# Patient Record
Sex: MALE | Race: BLACK OR AFRICAN AMERICAN | Employment: UNEMPLOYED | URBAN - METROPOLITAN AREA
[De-identification: names, ages, dates, MRNs, and addresses within clinical notes are randomized per-mention and may not be internally consistent; named-entity substitution may affect disease eponyms.]

---

## 2021-01-01 ENCOUNTER — ANESTHESIA (OUTPATIENT)
Dept: MEDSURG UNIT | Age: 61
DRG: 208 | End: 2021-01-01
Payer: MEDICARE

## 2021-01-01 ENCOUNTER — APPOINTMENT (OUTPATIENT)
Dept: GENERAL RADIOLOGY | Age: 61
DRG: 208 | End: 2021-01-01
Attending: HOSPITALIST
Payer: MEDICARE

## 2021-01-01 ENCOUNTER — APPOINTMENT (OUTPATIENT)
Dept: ULTRASOUND IMAGING | Age: 61
DRG: 208 | End: 2021-01-01
Attending: HOSPITALIST
Payer: MEDICARE

## 2021-01-01 ENCOUNTER — APPOINTMENT (OUTPATIENT)
Dept: CT IMAGING | Age: 61
DRG: 208 | End: 2021-01-01
Attending: HOSPITALIST
Payer: MEDICARE

## 2021-01-01 ENCOUNTER — APPOINTMENT (OUTPATIENT)
Dept: NON INVASIVE DIAGNOSTICS | Age: 61
DRG: 208 | End: 2021-01-01
Attending: FAMILY MEDICINE
Payer: MEDICARE

## 2021-01-01 ENCOUNTER — APPOINTMENT (OUTPATIENT)
Dept: GENERAL RADIOLOGY | Age: 61
DRG: 208 | End: 2021-01-01
Attending: EMERGENCY MEDICINE
Payer: MEDICARE

## 2021-01-01 ENCOUNTER — APPOINTMENT (OUTPATIENT)
Dept: MRI IMAGING | Age: 61
DRG: 208 | End: 2021-01-01
Attending: HOSPITALIST
Payer: MEDICARE

## 2021-01-01 ENCOUNTER — ANESTHESIA EVENT (OUTPATIENT)
Dept: MEDSURG UNIT | Age: 61
DRG: 208 | End: 2021-01-01
Payer: MEDICARE

## 2021-01-01 ENCOUNTER — HOSPITAL ENCOUNTER (INPATIENT)
Age: 61
LOS: 8 days | DRG: 208 | End: 2021-03-19
Attending: EMERGENCY MEDICINE | Admitting: FAMILY MEDICINE
Payer: MEDICARE

## 2021-01-01 ENCOUNTER — APPOINTMENT (OUTPATIENT)
Dept: NON INVASIVE DIAGNOSTICS | Age: 61
DRG: 208 | End: 2021-01-01
Attending: INTERNAL MEDICINE
Payer: MEDICARE

## 2021-01-01 VITALS
HEIGHT: 60 IN | SYSTOLIC BLOOD PRESSURE: 50 MMHG | DIASTOLIC BLOOD PRESSURE: 19 MMHG | BODY MASS INDEX: 25.32 KG/M2 | OXYGEN SATURATION: 99 % | WEIGHT: 129 LBS | TEMPERATURE: 98.2 F

## 2021-01-01 DIAGNOSIS — S31.000A WOUND OF SACRAL REGION, INITIAL ENCOUNTER: ICD-10-CM

## 2021-01-01 DIAGNOSIS — R09.02 HYPOXIA: Primary | ICD-10-CM

## 2021-01-01 LAB
ABO + RH BLD: NORMAL
ALBUMIN SERPL-MCNC: 1.6 G/DL (ref 3.4–5)
ALBUMIN SERPL-MCNC: 1.8 G/DL (ref 3.4–5)
ALBUMIN SERPL-MCNC: 2.2 G/DL (ref 3.4–5)
ALBUMIN SERPL-MCNC: 2.2 G/DL (ref 3.4–5)
ALBUMIN SERPL-MCNC: 2.3 G/DL (ref 3.4–5)
ALBUMIN SERPL-MCNC: 2.5 G/DL (ref 3.4–5)
ALBUMIN SERPL-MCNC: 2.6 G/DL (ref 3.4–5)
ALBUMIN/GLOB SERPL: 0.4 {RATIO} (ref 0.8–1.7)
ALBUMIN/GLOB SERPL: 0.4 {RATIO} (ref 0.8–1.7)
ALBUMIN/GLOB SERPL: 0.6 {RATIO} (ref 0.8–1.7)
ALBUMIN/GLOB SERPL: 0.7 {RATIO} (ref 0.8–1.7)
ALBUMIN/GLOB SERPL: 0.7 {RATIO} (ref 0.8–1.7)
ALP SERPL-CCNC: 412 U/L (ref 45–117)
ALP SERPL-CCNC: 462 U/L (ref 45–117)
ALP SERPL-CCNC: 466 U/L (ref 45–117)
ALP SERPL-CCNC: 472 U/L (ref 45–117)
ALP SERPL-CCNC: 500 U/L (ref 45–117)
ALP SERPL-CCNC: 524 U/L (ref 45–117)
ALP SERPL-CCNC: 538 U/L (ref 45–117)
ALT SERPL-CCNC: 34 U/L (ref 16–61)
ALT SERPL-CCNC: 35 U/L (ref 16–61)
ALT SERPL-CCNC: 47 U/L (ref 16–61)
ALT SERPL-CCNC: 50 U/L (ref 16–61)
ALT SERPL-CCNC: 52 U/L (ref 16–61)
ALT SERPL-CCNC: 53 U/L (ref 16–61)
ALT SERPL-CCNC: 54 U/L (ref 16–61)
ANION GAP SERPL CALC-SCNC: 10 MMOL/L (ref 3–18)
ANION GAP SERPL CALC-SCNC: 11 MMOL/L (ref 3–18)
ANION GAP SERPL CALC-SCNC: 7 MMOL/L (ref 3–18)
ANION GAP SERPL CALC-SCNC: 8 MMOL/L (ref 3–18)
ANION GAP SERPL CALC-SCNC: 9 MMOL/L (ref 3–18)
APTT PPP: 55.8 SEC (ref 23–36.4)
ARTERIAL PATENCY WRIST A: ABNORMAL
AST SERPL-CCNC: 100 U/L (ref 10–38)
AST SERPL-CCNC: 106 U/L (ref 10–38)
AST SERPL-CCNC: 36 U/L (ref 10–38)
AST SERPL-CCNC: 47 U/L (ref 10–38)
AST SERPL-CCNC: 49 U/L (ref 10–38)
AST SERPL-CCNC: 54 U/L (ref 10–38)
AST SERPL-CCNC: 86 U/L (ref 10–38)
ATRIAL RATE: 90 BPM
ATRIAL RATE: 95 BPM
AV VELOCITY RATIO: 0.61
AV VTI RATIO: 0.6
BACTERIA SPEC CULT: NORMAL
BACTERIA SPEC CULT: NORMAL
BASE DEFICIT BLD-SCNC: 1 MMOL/L
BASOPHILS # BLD: 0 K/UL (ref 0–0.1)
BASOPHILS NFR BLD: 0 % (ref 0–2)
BASOPHILS NFR BLD: 0 % (ref 0–3)
BASOPHILS NFR BLD: 0 % (ref 0–3)
BDY SITE: ABNORMAL
BILIRUB SERPL-MCNC: 0.4 MG/DL (ref 0.2–1)
BILIRUB SERPL-MCNC: 0.5 MG/DL (ref 0.2–1)
BILIRUB SERPL-MCNC: 0.6 MG/DL (ref 0.2–1)
BILIRUB SERPL-MCNC: 0.6 MG/DL (ref 0.2–1)
BILIRUB SERPL-MCNC: 0.7 MG/DL (ref 0.2–1)
BILIRUB SERPL-MCNC: 0.9 MG/DL (ref 0.2–1)
BILIRUB SERPL-MCNC: 1 MG/DL (ref 0.2–1)
BLD PROD TYP BPU: NORMAL
BLOOD GROUP ANTIBODIES SERPL: NORMAL
BNP SERPL-MCNC: ABNORMAL PG/ML (ref 0–900)
BODY TEMPERATURE: 98.3
BPU ID: NORMAL
BUN SERPL-MCNC: 10 MG/DL (ref 7–18)
BUN SERPL-MCNC: 14 MG/DL (ref 7–18)
BUN SERPL-MCNC: 17 MG/DL (ref 7–18)
BUN SERPL-MCNC: 30 MG/DL (ref 7–18)
BUN SERPL-MCNC: 31 MG/DL (ref 7–18)
BUN SERPL-MCNC: 34 MG/DL (ref 7–18)
BUN SERPL-MCNC: 38 MG/DL (ref 7–18)
BUN SERPL-MCNC: 42 MG/DL (ref 7–18)
BUN SERPL-MCNC: 50 MG/DL (ref 7–18)
BUN SERPL-MCNC: 52 MG/DL (ref 7–18)
BUN/CREAT SERPL: 4 (ref 12–20)
BUN/CREAT SERPL: 5 (ref 12–20)
BUN/CREAT SERPL: 5 (ref 12–20)
BUN/CREAT SERPL: 6 (ref 12–20)
BUN/CREAT SERPL: 6 (ref 12–20)
BUN/CREAT SERPL: 7 (ref 12–20)
BUN/CREAT SERPL: 7 (ref 12–20)
BUN/CREAT SERPL: 8 (ref 12–20)
CALCIUM SERPL-MCNC: 6.3 MG/DL (ref 8.5–10.1)
CALCIUM SERPL-MCNC: 6.5 MG/DL (ref 8.5–10.1)
CALCIUM SERPL-MCNC: 7 MG/DL (ref 8.5–10.1)
CALCIUM SERPL-MCNC: 7 MG/DL (ref 8.5–10.1)
CALCIUM SERPL-MCNC: 7.3 MG/DL (ref 8.5–10.1)
CALCIUM SERPL-MCNC: 7.4 MG/DL (ref 8.5–10.1)
CALCIUM SERPL-MCNC: 7.4 MG/DL (ref 8.5–10.1)
CALCIUM SERPL-MCNC: 7.5 MG/DL (ref 8.5–10.1)
CALCIUM SERPL-MCNC: 7.6 MG/DL (ref 8.5–10.1)
CALCIUM SERPL-MCNC: 7.7 MG/DL (ref 8.5–10.1)
CALCIUM SERPL-MCNC: 9.7 MG/DL (ref 8.5–10.1)
CALCULATED P AXIS, ECG09: 69 DEGREES
CALCULATED P AXIS, ECG09: 70 DEGREES
CALCULATED R AXIS, ECG10: 27 DEGREES
CALCULATED R AXIS, ECG10: 31 DEGREES
CALCULATED T AXIS, ECG11: 110 DEGREES
CALCULATED T AXIS, ECG11: 24 DEGREES
CALLED TO:,BCALL1: NORMAL
CHLORIDE SERPL-SCNC: 104 MMOL/L (ref 100–111)
CHLORIDE SERPL-SCNC: 105 MMOL/L (ref 100–111)
CHLORIDE SERPL-SCNC: 106 MMOL/L (ref 100–111)
CHLORIDE SERPL-SCNC: 107 MMOL/L (ref 100–111)
CK MB CFR SERPL CALC: 2.3 % (ref 0–4)
CK MB CFR SERPL CALC: 2.4 % (ref 0–4)
CK MB CFR SERPL CALC: 2.6 % (ref 0–4)
CK MB CFR SERPL CALC: 2.6 % (ref 0–4)
CK MB CFR SERPL CALC: 3.9 % (ref 0–4)
CK MB CFR SERPL CALC: 5.7 % (ref 0–4)
CK MB SERPL-MCNC: 1.2 NG/ML (ref 5–25)
CK MB SERPL-MCNC: 1.2 NG/ML (ref 5–25)
CK MB SERPL-MCNC: 1.6 NG/ML (ref 5–25)
CK MB SERPL-MCNC: 1.9 NG/ML (ref 5–25)
CK MB SERPL-MCNC: 2 NG/ML (ref 5–25)
CK MB SERPL-MCNC: 2.1 NG/ML (ref 5–25)
CK SERPL-CCNC: 21 U/L (ref 39–308)
CK SERPL-CCNC: 31 U/L (ref 39–308)
CK SERPL-CCNC: 66 U/L (ref 39–308)
CK SERPL-CCNC: 76 U/L (ref 39–308)
CK SERPL-CCNC: 82 U/L (ref 39–308)
CK SERPL-CCNC: 83 U/L (ref 39–308)
CO2 SERPL-SCNC: 22 MMOL/L (ref 21–32)
CO2 SERPL-SCNC: 23 MMOL/L (ref 21–32)
CO2 SERPL-SCNC: 23 MMOL/L (ref 21–32)
CO2 SERPL-SCNC: 24 MMOL/L (ref 21–32)
CO2 SERPL-SCNC: 25 MMOL/L (ref 21–32)
CO2 SERPL-SCNC: 25 MMOL/L (ref 21–32)
CO2 SERPL-SCNC: 26 MMOL/L (ref 21–32)
CO2 SERPL-SCNC: 26 MMOL/L (ref 21–32)
COVID-19 RAPID TEST, COVR: DETECTED
CREAT SERPL-MCNC: 2.25 MG/DL (ref 0.6–1.3)
CREAT SERPL-MCNC: 2.72 MG/DL (ref 0.6–1.3)
CREAT SERPL-MCNC: 3.32 MG/DL (ref 0.6–1.3)
CREAT SERPL-MCNC: 4.31 MG/DL (ref 0.6–1.3)
CREAT SERPL-MCNC: 4.79 MG/DL (ref 0.6–1.3)
CREAT SERPL-MCNC: 5.07 MG/DL (ref 0.6–1.3)
CREAT SERPL-MCNC: 5.33 MG/DL (ref 0.6–1.3)
CREAT SERPL-MCNC: 5.38 MG/DL (ref 0.6–1.3)
CREAT SERPL-MCNC: 6.15 MG/DL (ref 0.6–1.3)
CREAT SERPL-MCNC: 6.61 MG/DL (ref 0.6–1.3)
D DIMER PPP FEU-MCNC: 4 UG/ML(FEU)
DIAGNOSIS, 93000: NORMAL
DIAGNOSIS, 93000: NORMAL
DIFFERENTIAL METHOD BLD: ABNORMAL
ECHO AO ROOT DIAM: 3.25 CM
ECHO AV ANNULUS DIAM: 3.02 CM
ECHO AV AREA PEAK VELOCITY: 2.01 CM2
ECHO AV AREA PEAK VELOCITY: 2.6 CM2
ECHO AV AREA VTI: 2.53 CM2
ECHO AV AREA VTI: 2.7 CM2
ECHO AV AREA/BSA PEAK VELOCITY: 1.3 CM2/M2
ECHO AV AREA/BSA PEAK VELOCITY: 1.7 CM2/M2
ECHO AV AREA/BSA VTI: 1.6 CM2/M2
ECHO AV AREA/BSA VTI: 1.7 CM2/M2
ECHO AV MEAN GRADIENT: 4.65 MMHG
ECHO AV MEAN GRADIENT: 4.8 MMHG
ECHO AV MEAN VELOCITY: 1.06 M/S
ECHO AV PEAK GRADIENT: 8.36 MMHG
ECHO AV PEAK GRADIENT: 8.5 MMHG
ECHO AV PEAK VELOCITY: 145 CM/S
ECHO AV PEAK VELOCITY: 145.73 CM/S
ECHO AV VTI: 20.35 CM
ECHO AV VTI: 30.44 CM
ECHO EST RA PRESSURE: 3 MMHG
ECHO EST RA PRESSURE: 3 MMHG
ECHO IVC PROX: 0.83 CM
ECHO IVC PROX: 1.8 CM
ECHO IVC SNIFF: 0.83 CM
ECHO LA MAJOR AXIS: 3.19 CM
ECHO LA MINOR AXIS: 2.03 CM
ECHO LA TO AORTIC ROOT RATIO: 0.98
ECHO LA VOL 2C: 31.61 ML (ref 18–58)
ECHO LA VOL 2C: 68.83 ML (ref 18–58)
ECHO LA VOL 4C: 23.43 ML (ref 18–58)
ECHO LA VOL 4C: 39.51 ML (ref 18–58)
ECHO LA VOL BP: 45.28 ML (ref 18–58)
ECHO LA VOL BP: 46.16 ML (ref 18–58)
ECHO LA VOL/BSA BIPLANE: 29.32 ML/M2 (ref 16–28)
ECHO LA VOLUME INDEX A2C: 20.08 ML/M2 (ref 16–28)
ECHO LA VOLUME INDEX A4C: 25.09 ML/M2 (ref 16–28)
ECHO LV E' LATERAL VELOCITY: 11 CM/S
ECHO LV E' LATERAL VELOCITY: 8 CM/S
ECHO LV E' SEPTAL VELOCITY: 7 CM/S
ECHO LV E' SEPTAL VELOCITY: 9 CM/S
ECHO LV EDV A2C: 124.9 ML
ECHO LV EDV A2C: 98.1 ML
ECHO LV EDV A4C: 119.17 ML
ECHO LV EDV A4C: 120.7 ML
ECHO LV EDV BP: 110.2 ML (ref 67–155)
ECHO LV EDV BP: 123.76 ML (ref 67–155)
ECHO LV EDV INDEX A4C: 76.7 ML/M2
ECHO LV EDV INDEX BP: 70 ML/M2
ECHO LV EDV NDEX A2C: 62.3 ML/M2
ECHO LV EDV TEICHHOLZ: 0.49 ML
ECHO LV EJECTION FRACTION A2C: 44 %
ECHO LV EJECTION FRACTION A2C: 59 %
ECHO LV EJECTION FRACTION A4C: 48 %
ECHO LV EJECTION FRACTION A4C: 72 %
ECHO LV EJECTION FRACTION BIPLANE: 42.6 % (ref 55–100)
ECHO LV EJECTION FRACTION BIPLANE: 63.8 % (ref 55–100)
ECHO LV ESV A2C: 40.8 ML
ECHO LV ESV A2C: 69.5 ML
ECHO LV ESV A4C: 33.9 ML
ECHO LV ESV A4C: 62.1 ML
ECHO LV ESV BP: 39.9 ML (ref 22–58)
ECHO LV ESV BP: 71.05 ML (ref 22–58)
ECHO LV ESV INDEX A2C: 25.9 ML/M2
ECHO LV ESV INDEX A4C: 21.5 ML/M2
ECHO LV ESV INDEX BP: 25.3 ML/M2
ECHO LV ESV TEICHHOLZ: 0.24 ML
ECHO LV INTERNAL DIMENSION DIASTOLIC: 4.1 CM (ref 4.2–5.9)
ECHO LV INTERNAL DIMENSION DIASTOLIC: 4.56 CM (ref 4.2–5.9)
ECHO LV INTERNAL DIMENSION SYSTOLIC: 3.04 CM
ECHO LV INTERNAL DIMENSION SYSTOLIC: 3.5 CM
ECHO LV IVSD: 1.39 CM (ref 0.6–1)
ECHO LV IVSD: 1.64 CM (ref 0.6–1)
ECHO LV MASS 2D: 174.9 G (ref 88–224)
ECHO LV MASS 2D: 199.8 G (ref 88–224)
ECHO LV MASS INDEX 2D: 111.1 G/M2 (ref 49–115)
ECHO LV MASS INDEX 2D: 129 G/M2 (ref 49–115)
ECHO LV POSTERIOR WALL DIASTOLIC: 0.79 CM (ref 0.6–1)
ECHO LV POSTERIOR WALL DIASTOLIC: 0.99 CM (ref 0.6–1)
ECHO LVOT CARDIAC OUTPUT: 11.76 L/MIN
ECHO LVOT DIAM: 2.3 CM
ECHO LVOT DIAM: 2.31 CM
ECHO LVOT PEAK GRADIENT: 1.94 MMHG
ECHO LVOT PEAK GRADIENT: 3.2 MMHG
ECHO LVOT PEAK VELOCITY: 70 CM/S
ECHO LVOT PEAK VELOCITY: 89.46 CM/S
ECHO LVOT SV: 55.4 ML
ECHO LVOT SV: 81.4 ML
ECHO LVOT VTI: 12.39 CM
ECHO LVOT VTI: 19.47 CM
ECHO MV A VELOCITY: 0 CM/S
ECHO MV A VELOCITY: 70.74 CM/S
ECHO MV AREA PHT: 4.85 CM2
ECHO MV AREA PHT: 5.3 CM2
ECHO MV E DECELERATION TIME (DT): 142.7 MS
ECHO MV E DECELERATION TIME (DT): 156.49 MS
ECHO MV E VELOCITY: 104.17 CM/S
ECHO MV E VELOCITY: 94 CM/S
ECHO MV E/A RATIO: 1.47
ECHO MV E/E' LATERAL: 11.75
ECHO MV E/E' LATERAL: 9.47
ECHO MV E/E' RATIO (AVERAGED): 10.52
ECHO MV E/E' RATIO (AVERAGED): 12.59
ECHO MV E/E' SEPTAL: 11.57
ECHO MV E/E' SEPTAL: 13.43
ECHO MV MEAN INFLOW VELOCITY: 4.4 M/S
ECHO MV PRESSURE HALF TIME (PHT): 41.4 MS
ECHO MV PRESSURE HALF TIME (PHT): 45.38 MS
ECHO MV REGURGITANT PEAK GRADIENT: 104.4 MMHG
ECHO MV REGURGITANT PEAK VELOCITY: 510.78 CM/S
ECHO MV REGURGITANT VTIA: 173.7 CM
ECHO PV REGURGITANT MAX VELOCITY: 444 CM/S
ECHO PV REGURGITANT MAX VELOCITY: 510.78 CM/S
ECHO RA AREA 4C: 12.73 CM2
ECHO RA AREA 4C: 15.8 CM2
ECHO RV INTERNAL DIMENSION: 3.08 CM
ECHO RV INTERNAL DIMENSION: 3.43 CM
ECHO RV TAPSE: 1.7 CM (ref 1.5–2)
ECHO TV MEAN GRADIENT: 59.13 MMHG
ECHO TV MEAN GRADIENT: 81.38 MMHG
ECHO TV REGURGITANT MAX VELOCITY: 278.56 CM/S
ECHO TV REGURGITANT MAX VELOCITY: 285 CM/S
ECHO TV REGURGITANT PEAK GRADIENT: 31 MMHG
ECHO TV REGURGITANT PEAK GRADIENT: 32.38 MMHG
EOSINOPHIL # BLD: 0 K/UL (ref 0–0.4)
EOSINOPHIL # BLD: 0.1 K/UL (ref 0–0.4)
EOSINOPHIL # BLD: 0.1 K/UL (ref 0–0.4)
EOSINOPHIL NFR BLD: 0 % (ref 0–5)
EOSINOPHIL NFR BLD: 1 % (ref 0–5)
EOSINOPHIL NFR BLD: 3 % (ref 0–5)
ERYTHROCYTE [DISTWIDTH] IN BLOOD BY AUTOMATED COUNT: 19.8 % (ref 11.6–14.5)
ERYTHROCYTE [DISTWIDTH] IN BLOOD BY AUTOMATED COUNT: 19.8 % (ref 11.6–14.5)
ERYTHROCYTE [DISTWIDTH] IN BLOOD BY AUTOMATED COUNT: 19.9 % (ref 11.6–14.5)
ERYTHROCYTE [DISTWIDTH] IN BLOOD BY AUTOMATED COUNT: 20 % (ref 11.6–14.5)
ERYTHROCYTE [DISTWIDTH] IN BLOOD BY AUTOMATED COUNT: 20.1 % (ref 11.6–14.5)
ERYTHROCYTE [DISTWIDTH] IN BLOOD BY AUTOMATED COUNT: 20.3 % (ref 11.6–14.5)
ERYTHROCYTE [DISTWIDTH] IN BLOOD BY AUTOMATED COUNT: 20.3 % (ref 11.6–14.5)
FERRITIN SERPL-MCNC: 1354 NG/ML (ref 8–388)
FIBRINOGEN PPP-MCNC: <60 MG/DL (ref 210–451)
GAS FLOW.O2 O2 DELIVERY SYS: ABNORMAL L/MIN
GLOBULIN SER CALC-MCNC: 3.5 G/DL (ref 2–4)
GLOBULIN SER CALC-MCNC: 3.8 G/DL (ref 2–4)
GLOBULIN SER CALC-MCNC: 3.9 G/DL (ref 2–4)
GLOBULIN SER CALC-MCNC: 3.9 G/DL (ref 2–4)
GLOBULIN SER CALC-MCNC: 4 G/DL (ref 2–4)
GLOBULIN SER CALC-MCNC: 4.1 G/DL (ref 2–4)
GLOBULIN SER CALC-MCNC: 4.2 G/DL (ref 2–4)
GLUCOSE BLD STRIP.AUTO-MCNC: 103 MG/DL (ref 70–110)
GLUCOSE BLD STRIP.AUTO-MCNC: 103 MG/DL (ref 70–110)
GLUCOSE BLD STRIP.AUTO-MCNC: 106 MG/DL (ref 70–110)
GLUCOSE BLD STRIP.AUTO-MCNC: 112 MG/DL (ref 70–110)
GLUCOSE BLD STRIP.AUTO-MCNC: 114 MG/DL (ref 70–110)
GLUCOSE BLD STRIP.AUTO-MCNC: 117 MG/DL (ref 70–110)
GLUCOSE BLD STRIP.AUTO-MCNC: 128 MG/DL (ref 70–110)
GLUCOSE BLD STRIP.AUTO-MCNC: 128 MG/DL (ref 70–110)
GLUCOSE BLD STRIP.AUTO-MCNC: 143 MG/DL (ref 70–110)
GLUCOSE BLD STRIP.AUTO-MCNC: 153 MG/DL (ref 70–110)
GLUCOSE BLD STRIP.AUTO-MCNC: 154 MG/DL (ref 70–110)
GLUCOSE BLD STRIP.AUTO-MCNC: 156 MG/DL (ref 70–110)
GLUCOSE BLD STRIP.AUTO-MCNC: 179 MG/DL (ref 70–110)
GLUCOSE BLD STRIP.AUTO-MCNC: 196 MG/DL (ref 70–110)
GLUCOSE BLD STRIP.AUTO-MCNC: 211 MG/DL (ref 70–110)
GLUCOSE BLD STRIP.AUTO-MCNC: 216 MG/DL (ref 70–110)
GLUCOSE BLD STRIP.AUTO-MCNC: 218 MG/DL (ref 70–110)
GLUCOSE BLD STRIP.AUTO-MCNC: 233 MG/DL (ref 70–110)
GLUCOSE BLD STRIP.AUTO-MCNC: 247 MG/DL (ref 70–110)
GLUCOSE BLD STRIP.AUTO-MCNC: 255 MG/DL (ref 70–110)
GLUCOSE BLD STRIP.AUTO-MCNC: 278 MG/DL (ref 70–110)
GLUCOSE BLD STRIP.AUTO-MCNC: 296 MG/DL (ref 70–110)
GLUCOSE BLD STRIP.AUTO-MCNC: 307 MG/DL (ref 70–110)
GLUCOSE BLD STRIP.AUTO-MCNC: 378 MG/DL (ref 70–110)
GLUCOSE BLD STRIP.AUTO-MCNC: 64 MG/DL (ref 70–110)
GLUCOSE BLD STRIP.AUTO-MCNC: 66 MG/DL (ref 70–110)
GLUCOSE BLD STRIP.AUTO-MCNC: 73 MG/DL (ref 70–110)
GLUCOSE BLD STRIP.AUTO-MCNC: 77 MG/DL (ref 70–110)
GLUCOSE BLD STRIP.AUTO-MCNC: 79 MG/DL (ref 70–110)
GLUCOSE BLD STRIP.AUTO-MCNC: 82 MG/DL (ref 70–110)
GLUCOSE BLD STRIP.AUTO-MCNC: 82 MG/DL (ref 70–110)
GLUCOSE BLD STRIP.AUTO-MCNC: 83 MG/DL (ref 70–110)
GLUCOSE BLD STRIP.AUTO-MCNC: 85 MG/DL (ref 70–110)
GLUCOSE BLD STRIP.AUTO-MCNC: 90 MG/DL (ref 70–110)
GLUCOSE BLD STRIP.AUTO-MCNC: 96 MG/DL (ref 70–110)
GLUCOSE SERPL-MCNC: 102 MG/DL (ref 74–99)
GLUCOSE SERPL-MCNC: 120 MG/DL (ref 74–99)
GLUCOSE SERPL-MCNC: 139 MG/DL (ref 74–99)
GLUCOSE SERPL-MCNC: 199 MG/DL (ref 74–99)
GLUCOSE SERPL-MCNC: 213 MG/DL (ref 74–99)
GLUCOSE SERPL-MCNC: 217 MG/DL (ref 74–99)
GLUCOSE SERPL-MCNC: 285 MG/DL (ref 74–99)
GLUCOSE SERPL-MCNC: 70 MG/DL (ref 74–99)
GLUCOSE SERPL-MCNC: 87 MG/DL (ref 74–99)
GLUCOSE SERPL-MCNC: 87 MG/DL (ref 74–99)
HBA1C MFR BLD: 6.7 % (ref 4.2–5.6)
HBV SURFACE AB SER QL IA: POSITIVE
HBV SURFACE AB SERPL IA-ACNC: 10.37 MIU/ML
HBV SURFACE AG SER QL: <0.1 INDEX
HBV SURFACE AG SER QL: NEGATIVE
HCO3 BLD-SCNC: 23.3 MMOL/L (ref 22–26)
HCT VFR BLD AUTO: 26.6 % (ref 36–48)
HCT VFR BLD AUTO: 28.9 % (ref 36–48)
HCT VFR BLD AUTO: 29.7 % (ref 36–48)
HCT VFR BLD AUTO: 30.1 % (ref 36–48)
HCT VFR BLD AUTO: 31.1 % (ref 36–48)
HCT VFR BLD AUTO: 32.1 % (ref 36–48)
HCT VFR BLD AUTO: 33.2 % (ref 36–48)
HEP BS AB COMMENT,HBSAC: NORMAL
HGB BLD-MCNC: 10.7 G/DL (ref 13–16)
HGB BLD-MCNC: 8 G/DL (ref 13–16)
HGB BLD-MCNC: 9.1 G/DL (ref 13–16)
HGB BLD-MCNC: 9.2 G/DL (ref 13–16)
HGB BLD-MCNC: 9.3 G/DL (ref 13–16)
HGB BLD-MCNC: 9.6 G/DL (ref 13–16)
HGB BLD-MCNC: 9.9 G/DL (ref 13–16)
INR PPP: 1.7 (ref 0.8–1.2)
INR PPP: 2 (ref 0.8–1.2)
INR PPP: 2 (ref 0.8–1.2)
LACTATE BLD-SCNC: 1.42 MMOL/L (ref 0.4–2)
LACTATE SERPL-SCNC: 5.5 MMOL/L (ref 0.4–2)
LVFS 2D: 25.72 %
LVOT MG: 1.01 MMHG
LVOT MG: 2.07 MMHG
LVOT MV: 0.69 CM/S
LVSV (MOD BI): 43.79 ML
LVSV (MOD SINGLE 4C): 54.09 ML
LVSV (MOD SINGLE): 35.76 ML
LVSV (TEICH): 23.6 ML
LYMPHOCYTES # BLD: 0.6 K/UL (ref 0.8–3.5)
LYMPHOCYTES # BLD: 0.7 K/UL (ref 0.8–3.5)
LYMPHOCYTES # BLD: 0.8 K/UL (ref 0.9–3.6)
LYMPHOCYTES NFR BLD: 16 % (ref 21–52)
LYMPHOCYTES NFR BLD: 19 % (ref 20–51)
LYMPHOCYTES NFR BLD: 25 % (ref 20–51)
MAGNESIUM SERPL-MCNC: 1.7 MG/DL (ref 1.6–2.6)
MCH RBC QN AUTO: 21.4 PG (ref 24–34)
MCH RBC QN AUTO: 21.6 PG (ref 24–34)
MCH RBC QN AUTO: 21.8 PG (ref 24–34)
MCH RBC QN AUTO: 21.8 PG (ref 24–34)
MCH RBC QN AUTO: 21.9 PG (ref 24–34)
MCH RBC QN AUTO: 21.9 PG (ref 24–34)
MCH RBC QN AUTO: 22.4 PG (ref 24–34)
MCHC RBC AUTO-ENTMCNC: 30.1 G/DL (ref 31–37)
MCHC RBC AUTO-ENTMCNC: 30.8 G/DL (ref 31–37)
MCHC RBC AUTO-ENTMCNC: 30.9 G/DL (ref 31–37)
MCHC RBC AUTO-ENTMCNC: 30.9 G/DL (ref 31–37)
MCHC RBC AUTO-ENTMCNC: 31 G/DL (ref 31–37)
MCHC RBC AUTO-ENTMCNC: 31.5 G/DL (ref 31–37)
MCHC RBC AUTO-ENTMCNC: 32.2 G/DL (ref 31–37)
MCV RBC AUTO: 69.6 FL (ref 74–97)
MCV RBC AUTO: 69.6 FL (ref 74–97)
MCV RBC AUTO: 69.8 FL (ref 74–97)
MCV RBC AUTO: 70.5 FL (ref 74–97)
MCV RBC AUTO: 70.5 FL (ref 74–97)
MCV RBC AUTO: 70.7 FL (ref 74–97)
MCV RBC AUTO: 71.1 FL (ref 74–97)
MONOCYTES # BLD: 0.2 K/UL (ref 0.05–1.2)
MONOCYTES # BLD: 0.2 K/UL (ref 0–1)
MONOCYTES # BLD: 0.4 K/UL (ref 0–1)
MONOCYTES NFR BLD: 13 % (ref 2–9)
MONOCYTES NFR BLD: 4 % (ref 3–10)
MONOCYTES NFR BLD: 8 % (ref 2–9)
MV DEC SLOPE: 7.3
NEUTS BAND NFR BLD MANUAL: 2 % (ref 0–5)
NEUTS BAND NFR BLD MANUAL: 3 % (ref 0–5)
NEUTS SEG # BLD: 1.8 K/UL (ref 1.8–8)
NEUTS SEG # BLD: 2 K/UL (ref 1.8–8)
NEUTS SEG # BLD: 4.1 K/UL (ref 1.8–8)
NEUTS SEG NFR BLD: 62 % (ref 42–75)
NEUTS SEG NFR BLD: 65 % (ref 42–75)
NEUTS SEG NFR BLD: 79 % (ref 40–73)
O2/TOTAL GAS SETTING VFR VENT: 0.21 %
P-R INTERVAL, ECG05: 150 MS
P-R INTERVAL, ECG05: 152 MS
PCO2 BLD: 34.6 MMHG (ref 35–45)
PH BLD: 7.44 [PH] (ref 7.35–7.45)
PHOSPHATE SERPL-MCNC: 3.5 MG/DL (ref 2.5–4.9)
PHOSPHATE SERPL-MCNC: 3.7 MG/DL (ref 2.5–4.9)
PLATELET # BLD AUTO: 39 K/UL (ref 135–420)
PLATELET # BLD AUTO: 41 K/UL (ref 135–420)
PLATELET # BLD AUTO: 49 K/UL (ref 135–420)
PLATELET # BLD AUTO: 50 K/UL (ref 135–420)
PLATELET # BLD AUTO: 58 K/UL (ref 135–420)
PLATELET # BLD AUTO: 60 K/UL (ref 135–420)
PLATELET # BLD AUTO: 62 K/UL (ref 135–420)
PLATELET COMMENTS,PCOM: ABNORMAL
PLATELET COMMENTS,PCOM: ABNORMAL
PO2 BLD: 57 MMHG (ref 80–100)
POTASSIUM SERPL-SCNC: 3.5 MMOL/L (ref 3.5–5.5)
POTASSIUM SERPL-SCNC: 3.5 MMOL/L (ref 3.5–5.5)
POTASSIUM SERPL-SCNC: 3.6 MMOL/L (ref 3.5–5.5)
POTASSIUM SERPL-SCNC: 3.6 MMOL/L (ref 3.5–5.5)
POTASSIUM SERPL-SCNC: 3.7 MMOL/L (ref 3.5–5.5)
POTASSIUM SERPL-SCNC: 4.1 MMOL/L (ref 3.5–5.5)
POTASSIUM SERPL-SCNC: 4.2 MMOL/L (ref 3.5–5.5)
POTASSIUM SERPL-SCNC: 4.3 MMOL/L (ref 3.5–5.5)
POTASSIUM SERPL-SCNC: 4.3 MMOL/L (ref 3.5–5.5)
POTASSIUM SERPL-SCNC: 4.5 MMOL/L (ref 3.5–5.5)
PROT SERPL-MCNC: 5.8 G/DL (ref 6.4–8.2)
PROT SERPL-MCNC: 5.9 G/DL (ref 6.4–8.2)
PROT SERPL-MCNC: 6 G/DL (ref 6.4–8.2)
PROT SERPL-MCNC: 6.1 G/DL (ref 6.4–8.2)
PROT SERPL-MCNC: 6.1 G/DL (ref 6.4–8.2)
PROT SERPL-MCNC: 6.2 G/DL (ref 6.4–8.2)
PROT SERPL-MCNC: 6.5 G/DL (ref 6.4–8.2)
PROTHROMBIN TIME: 19.5 SEC (ref 11.5–15.2)
PROTHROMBIN TIME: 21.8 SEC (ref 11.5–15.2)
PROTHROMBIN TIME: 22.4 SEC (ref 11.5–15.2)
PTH-INTACT SERPL-MCNC: 17 PG/ML (ref 18.4–88)
Q-T INTERVAL, ECG07: 316 MS
Q-T INTERVAL, ECG07: 324 MS
QRS DURATION, ECG06: 88 MS
QRS DURATION, ECG06: 90 MS
QTC CALCULATION (BEZET), ECG08: 396 MS
QTC CALCULATION (BEZET), ECG08: 397 MS
RBC # BLD AUTO: 3.74 M/UL (ref 4.7–5.5)
RBC # BLD AUTO: 4.15 M/UL (ref 4.7–5.5)
RBC # BLD AUTO: 4.21 M/UL (ref 4.7–5.5)
RBC # BLD AUTO: 4.31 M/UL (ref 4.7–5.5)
RBC # BLD AUTO: 4.4 M/UL (ref 4.7–5.5)
RBC # BLD AUTO: 4.55 M/UL (ref 4.7–5.5)
RBC # BLD AUTO: 4.77 M/UL (ref 4.7–5.5)
RBC MORPH BLD: ABNORMAL
SAO2 % BLD: 90 % (ref 92–97)
SARS-COV-2, COV2: NORMAL
SARS-COV-2, COV2NT: NOT DETECTED
SERVICE CMNT-IMP: ABNORMAL
SERVICE CMNT-IMP: NORMAL
SERVICE CMNT-IMP: NORMAL
SODIUM SERPL-SCNC: 137 MMOL/L (ref 136–145)
SODIUM SERPL-SCNC: 137 MMOL/L (ref 136–145)
SODIUM SERPL-SCNC: 138 MMOL/L (ref 136–145)
SODIUM SERPL-SCNC: 140 MMOL/L (ref 136–145)
SODIUM SERPL-SCNC: 140 MMOL/L (ref 136–145)
SODIUM SERPL-SCNC: 141 MMOL/L (ref 136–145)
SOURCE, COVRS: ABNORMAL
SPECIMEN EXP DATE BLD: NORMAL
SPECIMEN TYPE: ABNORMAL
STATUS OF UNIT,%ST: NORMAL
TOTAL RESP. RATE, ITRR: 15
TROPONIN I SERPL-MCNC: 0.02 NG/ML (ref 0–0.04)
TROPONIN I SERPL-MCNC: 0.02 NG/ML (ref 0–0.04)
TROPONIN I SERPL-MCNC: <0.02 NG/ML (ref 0–0.04)
UNIT DIVISION, %UDIV: 0
VANCOMYCIN SERPL-MCNC: 21.8 UG/ML (ref 5–40)
VENTRICULAR RATE, ECG03: 90 BPM
VENTRICULAR RATE, ECG03: 95 BPM
WBC # BLD AUTO: 1.5 K/UL (ref 4.6–13.2)
WBC # BLD AUTO: 2.5 K/UL (ref 4.6–13.2)
WBC # BLD AUTO: 2.8 K/UL (ref 4.6–13.2)
WBC # BLD AUTO: 2.8 K/UL (ref 4.6–13.2)
WBC # BLD AUTO: 3.2 K/UL (ref 4.6–13.2)
WBC # BLD AUTO: 3.3 K/UL (ref 4.6–13.2)
WBC # BLD AUTO: 5.2 K/UL (ref 4.6–13.2)
WBC MORPH BLD: ABNORMAL

## 2021-01-01 PROCEDURE — 2709999900 HC NON-CHARGEABLE SUPPLY

## 2021-01-01 PROCEDURE — 74011250636 HC RX REV CODE- 250/636: Performed by: HOSPITALIST

## 2021-01-01 PROCEDURE — U0003 INFECTIOUS AGENT DETECTION BY NUCLEIC ACID (DNA OR RNA); SEVERE ACUTE RESPIRATORY SYNDROME CORONAVIRUS 2 (SARS-COV-2) (CORONAVIRUS DISEASE [COVID-19]), AMPLIFIED PROBE TECHNIQUE, MAKING USE OF HIGH THROUGHPUT TECHNOLOGIES AS DESCRIBED BY CMS-2020-01-R: HCPCS

## 2021-01-01 PROCEDURE — P9047 ALBUMIN (HUMAN), 25%, 50ML: HCPCS | Performed by: INTERNAL MEDICINE

## 2021-01-01 PROCEDURE — 74011000258 HC RX REV CODE- 258: Performed by: HOSPITALIST

## 2021-01-01 PROCEDURE — 74011000250 HC RX REV CODE- 250: Performed by: FAMILY MEDICINE

## 2021-01-01 PROCEDURE — 74011250637 HC RX REV CODE- 250/637: Performed by: FAMILY MEDICINE

## 2021-01-01 PROCEDURE — 74011250637 HC RX REV CODE- 250/637: Performed by: HOSPITALIST

## 2021-01-01 PROCEDURE — 36415 COLL VENOUS BLD VENIPUNCTURE: CPT

## 2021-01-01 PROCEDURE — 65660000000 HC RM CCU STEPDOWN

## 2021-01-01 PROCEDURE — 71045 X-RAY EXAM CHEST 1 VIEW: CPT

## 2021-01-01 PROCEDURE — 80048 BASIC METABOLIC PNL TOTAL CA: CPT

## 2021-01-01 PROCEDURE — 85610 PROTHROMBIN TIME: CPT

## 2021-01-01 PROCEDURE — 90935 HEMODIALYSIS ONE EVALUATION: CPT

## 2021-01-01 PROCEDURE — 85027 COMPLETE CBC AUTOMATED: CPT

## 2021-01-01 PROCEDURE — 87040 BLOOD CULTURE FOR BACTERIA: CPT

## 2021-01-01 PROCEDURE — 74011636637 HC RX REV CODE- 636/637: Performed by: HOSPITALIST

## 2021-01-01 PROCEDURE — 82962 GLUCOSE BLOOD TEST: CPT

## 2021-01-01 PROCEDURE — 74011250636 HC RX REV CODE- 250/636: Performed by: EMERGENCY MEDICINE

## 2021-01-01 PROCEDURE — 74011000250 HC RX REV CODE- 250: Performed by: EMERGENCY MEDICINE

## 2021-01-01 PROCEDURE — 93306 TTE W/DOPPLER COMPLETE: CPT

## 2021-01-01 PROCEDURE — 74011250636 HC RX REV CODE- 250/636: Performed by: FAMILY MEDICINE

## 2021-01-01 PROCEDURE — P9047 ALBUMIN (HUMAN), 25%, 50ML: HCPCS

## 2021-01-01 PROCEDURE — 74011250636 HC RX REV CODE- 250/636: Performed by: INTERNAL MEDICINE

## 2021-01-01 PROCEDURE — 77010033678 HC OXYGEN DAILY

## 2021-01-01 PROCEDURE — 82550 ASSAY OF CK (CPK): CPT

## 2021-01-01 PROCEDURE — 96105 ASSESSMENT OF APHASIA: CPT

## 2021-01-01 PROCEDURE — 99221 1ST HOSP IP/OBS SF/LOW 40: CPT | Performed by: NURSE PRACTITIONER

## 2021-01-01 PROCEDURE — 82553 CREATINE MB FRACTION: CPT

## 2021-01-01 PROCEDURE — P9047 ALBUMIN (HUMAN), 25%, 50ML: HCPCS | Performed by: FAMILY MEDICINE

## 2021-01-01 PROCEDURE — 36430 TRANSFUSION BLD/BLD COMPNT: CPT

## 2021-01-01 PROCEDURE — 74011636637 HC RX REV CODE- 636/637: Performed by: FAMILY MEDICINE

## 2021-01-01 PROCEDURE — 93005 ELECTROCARDIOGRAM TRACING: CPT

## 2021-01-01 PROCEDURE — 85025 COMPLETE CBC W/AUTO DIFF WBC: CPT

## 2021-01-01 PROCEDURE — 83036 HEMOGLOBIN GLYCOSYLATED A1C: CPT

## 2021-01-01 PROCEDURE — 76705 ECHO EXAM OF ABDOMEN: CPT

## 2021-01-01 PROCEDURE — 80053 COMPREHEN METABOLIC PANEL: CPT

## 2021-01-01 PROCEDURE — 99285 EMERGENCY DEPT VISIT HI MDM: CPT

## 2021-01-01 PROCEDURE — 83735 ASSAY OF MAGNESIUM: CPT

## 2021-01-01 PROCEDURE — 76450000000

## 2021-01-01 PROCEDURE — 82728 ASSAY OF FERRITIN: CPT

## 2021-01-01 PROCEDURE — 86901 BLOOD TYPING SEROLOGIC RH(D): CPT

## 2021-01-01 PROCEDURE — 70450 CT HEAD/BRAIN W/O DYE: CPT

## 2021-01-01 PROCEDURE — 99233 SBSQ HOSP IP/OBS HIGH 50: CPT | Performed by: NURSE PRACTITIONER

## 2021-01-01 PROCEDURE — 84100 ASSAY OF PHOSPHORUS: CPT

## 2021-01-01 PROCEDURE — 5A1D70Z PERFORMANCE OF URINARY FILTRATION, INTERMITTENT, LESS THAN 6 HOURS PER DAY: ICD-10-PCS | Performed by: INTERNAL MEDICINE

## 2021-01-01 PROCEDURE — 80202 ASSAY OF VANCOMYCIN: CPT

## 2021-01-01 PROCEDURE — 77030040393 HC DRSG OPTIFOAM GENT MDII -B

## 2021-01-01 PROCEDURE — 83880 ASSAY OF NATRIURETIC PEPTIDE: CPT

## 2021-01-01 PROCEDURE — 99356 PR PROLONGED SVC I/P OR OBS SETTING 1ST HOUR: CPT | Performed by: NURSE PRACTITIONER

## 2021-01-01 PROCEDURE — 83605 ASSAY OF LACTIC ACID: CPT

## 2021-01-01 PROCEDURE — 94002 VENT MGMT INPAT INIT DAY: CPT

## 2021-01-01 PROCEDURE — 85384 FIBRINOGEN ACTIVITY: CPT

## 2021-01-01 PROCEDURE — 87340 HEPATITIS B SURFACE AG IA: CPT

## 2021-01-01 PROCEDURE — 92950 HEART/LUNG RESUSCITATION CPR: CPT

## 2021-01-01 PROCEDURE — 96374 THER/PROPH/DIAG INJ IV PUSH: CPT

## 2021-01-01 PROCEDURE — 74011250636 HC RX REV CODE- 250/636: Performed by: STUDENT IN AN ORGANIZED HEALTH CARE EDUCATION/TRAINING PROGRAM

## 2021-01-01 PROCEDURE — XW13325 TRANSFUSION OF CONVALESCENT PLASMA (NONAUTOLOGOUS) INTO PERIPHERAL VEIN, PERCUTANEOUS APPROACH, NEW TECHNOLOGY GROUP 5: ICD-10-PCS | Performed by: HOSPITALIST

## 2021-01-01 PROCEDURE — 85730 THROMBOPLASTIN TIME PARTIAL: CPT

## 2021-01-01 PROCEDURE — 85379 FIBRIN DEGRADATION QUANT: CPT

## 2021-01-01 PROCEDURE — 70551 MRI BRAIN STEM W/O DYE: CPT

## 2021-01-01 PROCEDURE — 82803 BLOOD GASES ANY COMBINATION: CPT

## 2021-01-01 PROCEDURE — 74011250636 HC RX REV CODE- 250/636

## 2021-01-01 PROCEDURE — 84484 ASSAY OF TROPONIN QUANT: CPT

## 2021-01-01 PROCEDURE — 5A1935Z RESPIRATORY VENTILATION, LESS THAN 24 CONSECUTIVE HOURS: ICD-10-PCS | Performed by: HOSPITALIST

## 2021-01-01 PROCEDURE — 71250 CT THORAX DX C-: CPT

## 2021-01-01 PROCEDURE — 0BH18EZ INSERTION OF ENDOTRACHEAL AIRWAY INTO TRACHEA, VIA NATURAL OR ARTIFICIAL OPENING ENDOSCOPIC: ICD-10-PCS | Performed by: HOSPITALIST

## 2021-01-01 PROCEDURE — 86706 HEP B SURFACE ANTIBODY: CPT

## 2021-01-01 PROCEDURE — 77030040392 HC DRSG OPTIFOAM MDII -A

## 2021-01-01 PROCEDURE — 94760 N-INVAS EAR/PLS OXIMETRY 1: CPT

## 2021-01-01 PROCEDURE — 92610 EVALUATE SWALLOWING FUNCTION: CPT

## 2021-01-01 PROCEDURE — 65270000029 HC RM PRIVATE

## 2021-01-01 PROCEDURE — 96375 TX/PRO/DX INJ NEW DRUG ADDON: CPT

## 2021-01-01 PROCEDURE — 87635 SARS-COV-2 COVID-19 AMP PRB: CPT

## 2021-01-01 PROCEDURE — 36600 WITHDRAWAL OF ARTERIAL BLOOD: CPT

## 2021-01-01 PROCEDURE — 83970 ASSAY OF PARATHORMONE: CPT

## 2021-01-01 PROCEDURE — 74011250636 HC RX REV CODE- 250/636: Performed by: NURSE PRACTITIONER

## 2021-01-01 RX ORDER — DOCUSATE SODIUM 100 MG/1
100 CAPSULE, LIQUID FILLED ORAL DAILY
Status: DISCONTINUED | OUTPATIENT
Start: 2021-01-01 | End: 2021-01-01

## 2021-01-01 RX ORDER — FACIAL-BODY WIPES
10 EACH TOPICAL DAILY PRN
Status: DISCONTINUED | OUTPATIENT
Start: 2021-01-01 | End: 2021-01-01 | Stop reason: HOSPADM

## 2021-01-01 RX ORDER — MAGNESIUM SULFATE 100 %
16 CRYSTALS MISCELLANEOUS AS NEEDED
Status: DISCONTINUED | OUTPATIENT
Start: 2021-01-01 | End: 2021-01-01

## 2021-01-01 RX ORDER — ACETAMINOPHEN 650 MG/1
650 SUPPOSITORY RECTAL
Status: DISCONTINUED | OUTPATIENT
Start: 2021-01-01 | End: 2021-01-01 | Stop reason: SDUPTHER

## 2021-01-01 RX ORDER — INSULIN LISPRO 100 [IU]/ML
INJECTION, SOLUTION INTRAVENOUS; SUBCUTANEOUS EVERY 4 HOURS
Status: DISCONTINUED | OUTPATIENT
Start: 2021-01-01 | End: 2021-01-01

## 2021-01-01 RX ORDER — GUAIFENESIN 100 MG/5ML
81 LIQUID (ML) ORAL DAILY
Status: DISCONTINUED | OUTPATIENT
Start: 2021-01-01 | End: 2021-01-01

## 2021-01-01 RX ORDER — ACETAMINOPHEN 325 MG/1
650 TABLET ORAL
Status: DISCONTINUED | OUTPATIENT
Start: 2021-01-01 | End: 2021-01-01

## 2021-01-01 RX ORDER — HEPARIN SODIUM 1000 [USP'U]/ML
4000 INJECTION, SOLUTION INTRAVENOUS; SUBCUTANEOUS
Status: DISCONTINUED | OUTPATIENT
Start: 2021-01-01 | End: 2021-01-01

## 2021-01-01 RX ORDER — ONDANSETRON 2 MG/ML
4 INJECTION INTRAMUSCULAR; INTRAVENOUS
Status: DISCONTINUED | OUTPATIENT
Start: 2021-01-01 | End: 2021-01-01 | Stop reason: HOSPADM

## 2021-01-01 RX ORDER — LORAZEPAM 2 MG/ML
1 INJECTION INTRAMUSCULAR
Status: DISCONTINUED | OUTPATIENT
Start: 2021-01-01 | End: 2021-01-01 | Stop reason: HOSPADM

## 2021-01-01 RX ORDER — INSULIN LISPRO 100 [IU]/ML
INJECTION, SOLUTION INTRAVENOUS; SUBCUTANEOUS
Status: DISCONTINUED | OUTPATIENT
Start: 2021-01-01 | End: 2021-01-01

## 2021-01-01 RX ORDER — GUAIFENESIN/DEXTROMETHORPHAN 100-10MG/5
5 SYRUP ORAL
Status: DISCONTINUED | OUTPATIENT
Start: 2021-01-01 | End: 2021-01-01

## 2021-01-01 RX ORDER — PROMETHAZINE HYDROCHLORIDE 25 MG/1
12.5 TABLET ORAL
Status: DISCONTINUED | OUTPATIENT
Start: 2021-01-01 | End: 2021-01-01

## 2021-01-01 RX ORDER — LORAZEPAM 2 MG/ML
0.5 INJECTION INTRAMUSCULAR
Status: DISCONTINUED | OUTPATIENT
Start: 2021-01-01 | End: 2021-01-01

## 2021-01-01 RX ORDER — HYDROCODONE BITARTRATE AND ACETAMINOPHEN 5; 325 MG/1; MG/1
1 TABLET ORAL
Status: DISCONTINUED | OUTPATIENT
Start: 2021-01-01 | End: 2021-01-01

## 2021-01-01 RX ORDER — SODIUM CHLORIDE 9 MG/ML
250 INJECTION, SOLUTION INTRAVENOUS AS NEEDED
Status: DISCONTINUED | OUTPATIENT
Start: 2021-01-01 | End: 2021-01-01

## 2021-01-01 RX ORDER — SODIUM CHLORIDE 0.9 % (FLUSH) 0.9 %
5-10 SYRINGE (ML) INJECTION AS NEEDED
Status: DISCONTINUED | OUTPATIENT
Start: 2021-01-01 | End: 2021-01-01

## 2021-01-01 RX ORDER — FLUTICASONE PROPIONATE 50 MCG
2 SPRAY, SUSPENSION (ML) NASAL DAILY
Status: DISCONTINUED | OUTPATIENT
Start: 2021-01-01 | End: 2021-01-01

## 2021-01-01 RX ORDER — ACETAMINOPHEN 325 MG/1
650 TABLET ORAL
Status: DISCONTINUED | OUTPATIENT
Start: 2021-01-01 | End: 2021-01-01 | Stop reason: HOSPADM

## 2021-01-01 RX ORDER — ALBUMIN HUMAN 250 G/1000ML
SOLUTION INTRAVENOUS
Status: COMPLETED
Start: 2021-01-01 | End: 2021-01-01

## 2021-01-01 RX ORDER — FAMOTIDINE 20 MG/1
10 TABLET, FILM COATED ORAL EVERY EVENING
Status: DISCONTINUED | OUTPATIENT
Start: 2021-01-01 | End: 2021-01-01

## 2021-01-01 RX ORDER — GLYCOPYRROLATE 0.2 MG/ML
0.2 INJECTION INTRAMUSCULAR; INTRAVENOUS
Status: DISCONTINUED | OUTPATIENT
Start: 2021-01-01 | End: 2021-01-01 | Stop reason: HOSPADM

## 2021-01-01 RX ORDER — SODIUM CHLORIDE 0.9 % (FLUSH) 0.9 %
5-40 SYRINGE (ML) INJECTION AS NEEDED
Status: DISCONTINUED | OUTPATIENT
Start: 2021-01-01 | End: 2021-01-01

## 2021-01-01 RX ORDER — DEXTROSE MONOHYDRATE 100 MG/ML
125-250 INJECTION, SOLUTION INTRAVENOUS AS NEEDED
Status: DISCONTINUED | OUTPATIENT
Start: 2021-01-01 | End: 2021-01-01

## 2021-01-01 RX ORDER — ACETAMINOPHEN 325 MG/1
650 TABLET ORAL
Status: DISCONTINUED | OUTPATIENT
Start: 2021-01-01 | End: 2021-01-01 | Stop reason: SDUPTHER

## 2021-01-01 RX ORDER — FAMOTIDINE 20 MG/1
20 TABLET, FILM COATED ORAL 2 TIMES DAILY
Status: DISCONTINUED | OUTPATIENT
Start: 2021-01-01 | End: 2021-01-01 | Stop reason: DRUGHIGH

## 2021-01-01 RX ORDER — TRAZODONE HYDROCHLORIDE 50 MG/1
50 TABLET ORAL
Status: DISCONTINUED | OUTPATIENT
Start: 2021-01-01 | End: 2021-01-01

## 2021-01-01 RX ORDER — SODIUM CHLORIDE 0.9 % (FLUSH) 0.9 %
5-40 SYRINGE (ML) INJECTION EVERY 8 HOURS
Status: DISCONTINUED | OUTPATIENT
Start: 2021-01-01 | End: 2021-01-01

## 2021-01-01 RX ORDER — DEXAMETHASONE SODIUM PHOSPHATE 4 MG/ML
6 INJECTION, SOLUTION INTRA-ARTICULAR; INTRALESIONAL; INTRAMUSCULAR; INTRAVENOUS; SOFT TISSUE EVERY 24 HOURS
Status: DISCONTINUED | OUTPATIENT
Start: 2021-01-01 | End: 2021-01-01

## 2021-01-01 RX ORDER — MIDODRINE HYDROCHLORIDE 10 MG/1
10 TABLET ORAL
Status: DISCONTINUED | OUTPATIENT
Start: 2021-01-01 | End: 2021-01-01

## 2021-01-01 RX ORDER — POLYETHYLENE GLYCOL 3350 17 G/17G
17 POWDER, FOR SOLUTION ORAL DAILY PRN
Status: DISCONTINUED | OUTPATIENT
Start: 2021-01-01 | End: 2021-01-01

## 2021-01-01 RX ORDER — ACETAMINOPHEN 650 MG/1
650 SUPPOSITORY RECTAL
Status: DISCONTINUED | OUTPATIENT
Start: 2021-01-01 | End: 2021-01-01

## 2021-01-01 RX ORDER — ACETAMINOPHEN 650 MG/1
650 SUPPOSITORY RECTAL
Status: DISCONTINUED | OUTPATIENT
Start: 2021-01-01 | End: 2021-01-01 | Stop reason: HOSPADM

## 2021-01-01 RX ORDER — DICLOFENAC SODIUM 10 MG/G
2 GEL TOPICAL DAILY PRN
Status: DISCONTINUED | OUTPATIENT
Start: 2021-01-01 | End: 2021-01-01

## 2021-01-01 RX ORDER — ATORVASTATIN CALCIUM 20 MG/1
80 TABLET, FILM COATED ORAL DAILY
Status: DISCONTINUED | OUTPATIENT
Start: 2021-01-01 | End: 2021-01-01

## 2021-01-01 RX ORDER — MOMETASONE FUROATE 50 UG/1
2 SPRAY, METERED NASAL
Status: DISCONTINUED | OUTPATIENT
Start: 2021-01-01 | End: 2021-01-01 | Stop reason: CLARIF

## 2021-01-01 RX ORDER — NOREPINEPHRINE BIT/0.9 % NACL 8 MG/250ML
.5-16 INFUSION BOTTLE (ML) INTRAVENOUS
Status: DISCONTINUED | OUTPATIENT
Start: 2021-01-01 | End: 2021-01-01

## 2021-01-01 RX ORDER — MORPHINE SULFATE 2 MG/ML
2 INJECTION, SOLUTION INTRAMUSCULAR; INTRAVENOUS
Status: DISCONTINUED | OUTPATIENT
Start: 2021-01-01 | End: 2021-01-01 | Stop reason: HOSPADM

## 2021-01-01 RX ORDER — ALBUMIN HUMAN 250 G/1000ML
25 SOLUTION INTRAVENOUS ONCE
Status: COMPLETED | OUTPATIENT
Start: 2021-01-01 | End: 2021-01-01

## 2021-01-01 RX ORDER — ALBUMIN HUMAN 250 G/1000ML
25 SOLUTION INTRAVENOUS
Status: DISCONTINUED | OUTPATIENT
Start: 2021-01-01 | End: 2021-01-01

## 2021-01-01 RX ORDER — HEPARIN SODIUM 5000 [USP'U]/ML
5000 INJECTION, SOLUTION INTRAVENOUS; SUBCUTANEOUS EVERY 8 HOURS
Status: DISCONTINUED | OUTPATIENT
Start: 2021-01-01 | End: 2021-01-01

## 2021-01-01 RX ORDER — CALCIUM CARB/MAGNESIUM CARB 311-232MG
5 TABLET ORAL
Status: DISCONTINUED | OUTPATIENT
Start: 2021-01-01 | End: 2021-01-01

## 2021-01-01 RX ORDER — SODIUM CHLORIDE 9 MG/ML
1000 INJECTION, SOLUTION INTRAVENOUS CONTINUOUS
Status: DISCONTINUED | OUTPATIENT
Start: 2021-01-01 | End: 2021-01-01

## 2021-01-01 RX ORDER — ALBUMIN HUMAN 250 G/1000ML
25 SOLUTION INTRAVENOUS EVERY 6 HOURS
Status: DISCONTINUED | OUTPATIENT
Start: 2021-01-01 | End: 2021-01-01

## 2021-01-01 RX ADMIN — MIDODRINE HYDROCHLORIDE 10 MG: 10 TABLET ORAL at 13:14

## 2021-01-01 RX ADMIN — COLLAGENASE SANTYL 250 MG: 250 OINTMENT TOPICAL at 17:25

## 2021-01-01 RX ADMIN — COLLAGENASE SANTYL: 250 OINTMENT TOPICAL at 09:07

## 2021-01-01 RX ADMIN — Medication 10 ML: at 05:38

## 2021-01-01 RX ADMIN — COLLAGENASE SANTYL: 250 OINTMENT TOPICAL at 08:55

## 2021-01-01 RX ADMIN — ACETAMINOPHEN 650 MG: 325 TABLET ORAL at 17:02

## 2021-01-01 RX ADMIN — CEFTRIAXONE 1 G: 1 INJECTION, POWDER, FOR SOLUTION INTRAMUSCULAR; INTRAVENOUS at 22:31

## 2021-01-01 RX ADMIN — INSULIN LISPRO 2 UNITS: 100 INJECTION, SOLUTION INTRAVENOUS; SUBCUTANEOUS at 06:59

## 2021-01-01 RX ADMIN — MIDODRINE HYDROCHLORIDE 10 MG: 10 TABLET ORAL at 16:28

## 2021-01-01 RX ADMIN — HYDROCODONE BITARTRATE AND ACETAMINOPHEN 1 TABLET: 5; 325 TABLET ORAL at 00:34

## 2021-01-01 RX ADMIN — Medication 5 MG: at 21:49

## 2021-01-01 RX ADMIN — Medication 10 ML: at 21:49

## 2021-01-01 RX ADMIN — Medication 10 ML: at 06:00

## 2021-01-01 RX ADMIN — INSULIN LISPRO 15 UNITS: 100 INJECTION, SOLUTION INTRAVENOUS; SUBCUTANEOUS at 13:15

## 2021-01-01 RX ADMIN — ASPIRIN 81 MG: 81 TABLET, CHEWABLE ORAL at 08:51

## 2021-01-01 RX ADMIN — TRAZODONE HYDROCHLORIDE 50 MG: 50 TABLET ORAL at 21:49

## 2021-01-01 RX ADMIN — AZITHROMYCIN MONOHYDRATE 500 MG: 500 INJECTION, POWDER, LYOPHILIZED, FOR SOLUTION INTRAVENOUS at 22:57

## 2021-01-01 RX ADMIN — MIDODRINE HYDROCHLORIDE 10 MG: 10 TABLET ORAL at 18:12

## 2021-01-01 RX ADMIN — Medication 5 MG: at 00:22

## 2021-01-01 RX ADMIN — DOCUSATE SODIUM 100 MG: 100 CAPSULE, LIQUID FILLED ORAL at 10:00

## 2021-01-01 RX ADMIN — MIDODRINE HYDROCHLORIDE 10 MG: 10 TABLET ORAL at 09:07

## 2021-01-01 RX ADMIN — CEFTRIAXONE 1 G: 1 INJECTION, POWDER, FOR SOLUTION INTRAMUSCULAR; INTRAVENOUS at 22:23

## 2021-01-01 RX ADMIN — Medication 10 ML: at 13:16

## 2021-01-01 RX ADMIN — FLUTICASONE PROPIONATE 2 SPRAY: 50 SPRAY, METERED NASAL at 09:00

## 2021-01-01 RX ADMIN — VANCOMYCIN HYDROCHLORIDE 1000 MG: 1 INJECTION, POWDER, LYOPHILIZED, FOR SOLUTION INTRAVENOUS at 16:27

## 2021-01-01 RX ADMIN — DOCUSATE SODIUM 100 MG: 100 CAPSULE, LIQUID FILLED ORAL at 08:52

## 2021-01-01 RX ADMIN — LORAZEPAM 0.5 MG: 2 INJECTION INTRAMUSCULAR at 23:35

## 2021-01-01 RX ADMIN — DEXAMETHASONE SODIUM PHOSPHATE 6 MG: 4 INJECTION, SOLUTION INTRAMUSCULAR; INTRAVENOUS at 17:26

## 2021-01-01 RX ADMIN — ALBUMIN (HUMAN) 25 G: 0.25 INJECTION, SOLUTION INTRAVENOUS at 08:02

## 2021-01-01 RX ADMIN — ALBUMIN (HUMAN) 25 G: 0.25 INJECTION, SOLUTION INTRAVENOUS at 23:12

## 2021-01-01 RX ADMIN — TRAZODONE HYDROCHLORIDE 50 MG: 50 TABLET ORAL at 21:09

## 2021-01-01 RX ADMIN — ALBUMIN (HUMAN) 25 G: 0.25 INJECTION, SOLUTION INTRAVENOUS at 09:45

## 2021-01-01 RX ADMIN — AZITHROMYCIN MONOHYDRATE 500 MG: 500 INJECTION, POWDER, LYOPHILIZED, FOR SOLUTION INTRAVENOUS at 21:09

## 2021-01-01 RX ADMIN — INSULIN LISPRO 6 UNITS: 100 INJECTION, SOLUTION INTRAVENOUS; SUBCUTANEOUS at 13:01

## 2021-01-01 RX ADMIN — HYDROCODONE BITARTRATE AND ACETAMINOPHEN 1 TABLET: 5; 325 TABLET ORAL at 17:45

## 2021-01-01 RX ADMIN — CEFTRIAXONE 1 G: 1 INJECTION, POWDER, FOR SOLUTION INTRAMUSCULAR; INTRAVENOUS at 22:56

## 2021-01-01 RX ADMIN — ATORVASTATIN CALCIUM 80 MG: 20 TABLET, FILM COATED ORAL at 15:14

## 2021-01-01 RX ADMIN — INSULIN LISPRO 4 UNITS: 100 INJECTION, SOLUTION INTRAVENOUS; SUBCUTANEOUS at 21:49

## 2021-01-01 RX ADMIN — Medication 10 ML: at 22:59

## 2021-01-01 RX ADMIN — B-COMPLEX W/ C & FOLIC ACID TAB 1 MG 1 TABLET: 1 TAB at 08:51

## 2021-01-01 RX ADMIN — HYDROCODONE BITARTRATE AND ACETAMINOPHEN 1 TABLET: 5; 325 TABLET ORAL at 08:51

## 2021-01-01 RX ADMIN — MIDODRINE HYDROCHLORIDE 10 MG: 10 TABLET ORAL at 08:52

## 2021-01-01 RX ADMIN — Medication 10 ML: at 05:55

## 2021-01-01 RX ADMIN — CEFTRIAXONE 1 G: 1 INJECTION, POWDER, FOR SOLUTION INTRAMUSCULAR; INTRAVENOUS at 00:21

## 2021-01-01 RX ADMIN — Medication 10 ML: at 00:22

## 2021-01-01 RX ADMIN — B-COMPLEX W/ C & FOLIC ACID TAB 1 MG 1 TABLET: 1 TAB at 09:22

## 2021-01-01 RX ADMIN — DEXTROSE MONOHYDRATE 250 ML: 100 INJECTION, SOLUTION INTRAVENOUS at 06:18

## 2021-01-01 RX ADMIN — FAMOTIDINE 10 MG: 20 TABLET ORAL at 22:30

## 2021-01-01 RX ADMIN — Medication 10 ML: at 22:23

## 2021-01-01 RX ADMIN — ALBUMIN (HUMAN) 25 G: 0.25 INJECTION, SOLUTION INTRAVENOUS at 12:05

## 2021-01-01 RX ADMIN — CEFTRIAXONE 1 G: 1 INJECTION, POWDER, FOR SOLUTION INTRAMUSCULAR; INTRAVENOUS at 21:49

## 2021-01-01 RX ADMIN — EPOETIN ALFA-EPBX 6000 UNITS: 3000 INJECTION, SOLUTION INTRAVENOUS; SUBCUTANEOUS at 21:12

## 2021-01-01 RX ADMIN — DEXAMETHASONE SODIUM PHOSPHATE 6 MG: 4 INJECTION, SOLUTION INTRAMUSCULAR; INTRAVENOUS at 17:32

## 2021-01-01 RX ADMIN — B-COMPLEX W/ C & FOLIC ACID TAB 1 MG 1 TABLET: 1 TAB at 10:01

## 2021-01-01 RX ADMIN — LORAZEPAM 1 MG: 2 INJECTION INTRAMUSCULAR; INTRAVENOUS at 14:25

## 2021-01-01 RX ADMIN — MIDODRINE HYDROCHLORIDE 10 MG: 10 TABLET ORAL at 13:16

## 2021-01-01 RX ADMIN — EPOETIN ALFA-EPBX 6000 UNITS: 3000 INJECTION, SOLUTION INTRAVENOUS; SUBCUTANEOUS at 22:33

## 2021-01-01 RX ADMIN — INSULIN LISPRO 4 UNITS: 100 INJECTION, SOLUTION INTRAVENOUS; SUBCUTANEOUS at 18:14

## 2021-01-01 RX ADMIN — CEFTRIAXONE 1 G: 1 INJECTION, POWDER, FOR SOLUTION INTRAMUSCULAR; INTRAVENOUS at 21:02

## 2021-01-01 RX ADMIN — CEFTRIAXONE 2 G: 2 INJECTION, POWDER, FOR SOLUTION INTRAMUSCULAR; INTRAVENOUS at 22:13

## 2021-01-01 RX ADMIN — B-COMPLEX W/ C & FOLIC ACID TAB 1 MG 1 TABLET: 1 TAB at 08:52

## 2021-01-01 RX ADMIN — TRAZODONE HYDROCHLORIDE 50 MG: 50 TABLET ORAL at 22:31

## 2021-01-01 RX ADMIN — MORPHINE SULFATE 2 MG: 2 INJECTION, SOLUTION INTRAMUSCULAR; INTRAVENOUS at 14:31

## 2021-01-01 RX ADMIN — Medication 10 ML: at 07:01

## 2021-01-01 RX ADMIN — AZITHROMYCIN MONOHYDRATE 500 MG: 500 INJECTION, POWDER, LYOPHILIZED, FOR SOLUTION INTRAVENOUS at 21:59

## 2021-01-01 RX ADMIN — FAMOTIDINE 10 MG: 20 TABLET ORAL at 21:08

## 2021-01-01 RX ADMIN — FLUTICASONE PROPIONATE 2 SPRAY: 50 SPRAY, METERED NASAL at 09:08

## 2021-01-01 RX ADMIN — AZITHROMYCIN MONOHYDRATE 500 MG: 500 INJECTION, POWDER, LYOPHILIZED, FOR SOLUTION INTRAVENOUS at 00:16

## 2021-01-01 RX ADMIN — AZITHROMYCIN MONOHYDRATE 500 MG: 500 INJECTION, POWDER, LYOPHILIZED, FOR SOLUTION INTRAVENOUS at 22:31

## 2021-01-01 RX ADMIN — VANCOMYCIN HYDROCHLORIDE 500 MG: 500 INJECTION, POWDER, LYOPHILIZED, FOR SOLUTION INTRAVENOUS at 13:13

## 2021-01-01 RX ADMIN — FAMOTIDINE 20 MG: 20 TABLET ORAL at 08:51

## 2021-01-01 RX ADMIN — Medication 5 MG: at 21:08

## 2021-01-01 RX ADMIN — DEXAMETHASONE SODIUM PHOSPHATE 6 MG: 4 INJECTION, SOLUTION INTRAMUSCULAR; INTRAVENOUS at 17:23

## 2021-01-01 RX ADMIN — MIDODRINE HYDROCHLORIDE 10 MG: 10 TABLET ORAL at 17:26

## 2021-01-01 RX ADMIN — Medication 10 ML: at 17:33

## 2021-01-01 RX ADMIN — Medication 10 ML: at 13:15

## 2021-01-01 RX ADMIN — TRAZODONE HYDROCHLORIDE 50 MG: 50 TABLET ORAL at 22:56

## 2021-01-01 RX ADMIN — MIDODRINE HYDROCHLORIDE 10 MG: 10 TABLET ORAL at 10:00

## 2021-01-01 RX ADMIN — Medication 5 MG: at 22:23

## 2021-01-01 RX ADMIN — AZITHROMYCIN MONOHYDRATE 500 MG: 500 INJECTION, POWDER, LYOPHILIZED, FOR SOLUTION INTRAVENOUS at 22:23

## 2021-01-01 RX ADMIN — TRAZODONE HYDROCHLORIDE 50 MG: 50 TABLET ORAL at 00:22

## 2021-01-01 RX ADMIN — COLLAGENASE SANTYL: 250 OINTMENT TOPICAL at 09:23

## 2021-01-01 RX ADMIN — COLLAGENASE SANTYL: 250 OINTMENT TOPICAL at 17:00

## 2021-01-01 RX ADMIN — Medication 10 ML: at 16:28

## 2021-01-01 RX ADMIN — VANCOMYCIN HYDROCHLORIDE 500 MG: 500 INJECTION, POWDER, LYOPHILIZED, FOR SOLUTION INTRAVENOUS at 06:22

## 2021-01-01 RX ADMIN — ALBUMIN (HUMAN) 25 G: 0.25 INJECTION, SOLUTION INTRAVENOUS at 09:33

## 2021-01-01 RX ADMIN — MIDODRINE HYDROCHLORIDE 10 MG: 10 TABLET ORAL at 12:09

## 2021-01-01 RX ADMIN — DEXAMETHASONE SODIUM PHOSPHATE 6 MG: 4 INJECTION, SOLUTION INTRAMUSCULAR; INTRAVENOUS at 18:11

## 2021-01-01 RX ADMIN — PIPERACILLIN AND TAZOBACTAM 2.25 G: 2; .25 INJECTION, POWDER, LYOPHILIZED, FOR SOLUTION INTRAVENOUS at 17:46

## 2021-01-01 RX ADMIN — Medication 10 ML: at 22:35

## 2021-01-01 RX ADMIN — MIDODRINE HYDROCHLORIDE 10 MG: 10 TABLET ORAL at 12:23

## 2021-01-01 RX ADMIN — INSULIN LISPRO 4 UNITS: 100 INJECTION, SOLUTION INTRAVENOUS; SUBCUTANEOUS at 12:23

## 2021-01-01 RX ADMIN — INSULIN LISPRO 4 UNITS: 100 INJECTION, SOLUTION INTRAVENOUS; SUBCUTANEOUS at 06:30

## 2021-01-01 RX ADMIN — FLUTICASONE PROPIONATE 2 SPRAY: 50 SPRAY, METERED NASAL at 08:56

## 2021-01-01 RX ADMIN — MIDODRINE HYDROCHLORIDE 10 MG: 10 TABLET ORAL at 17:18

## 2021-01-01 RX ADMIN — MIDODRINE HYDROCHLORIDE 10 MG: 10 TABLET ORAL at 09:22

## 2021-01-01 RX ADMIN — Medication 10 ML: at 13:24

## 2021-01-01 RX ADMIN — AZITHROMYCIN MONOHYDRATE 500 MG: 500 INJECTION, POWDER, LYOPHILIZED, FOR SOLUTION INTRAVENOUS at 22:36

## 2021-01-01 RX ADMIN — TRAZODONE HYDROCHLORIDE 50 MG: 50 TABLET ORAL at 22:23

## 2021-01-01 RX ADMIN — HEPARIN SODIUM 4000 UNITS: 1000 INJECTION INTRAVENOUS; SUBCUTANEOUS at 02:37

## 2021-01-01 RX ADMIN — FAMOTIDINE 10 MG: 20 TABLET ORAL at 22:23

## 2021-01-01 RX ADMIN — B-COMPLEX W/ C & FOLIC ACID TAB 1 MG 1 TABLET: 1 TAB at 09:07

## 2021-01-01 RX ADMIN — ASPIRIN 81 MG: 81 TABLET, CHEWABLE ORAL at 15:14

## 2021-01-01 RX ADMIN — MIDODRINE HYDROCHLORIDE 10 MG: 10 TABLET ORAL at 11:18

## 2021-01-01 RX ADMIN — MIDODRINE HYDROCHLORIDE 10 MG: 10 TABLET ORAL at 03:10

## 2021-01-01 RX ADMIN — INSULIN LISPRO 6 UNITS: 100 INJECTION, SOLUTION INTRAVENOUS; SUBCUTANEOUS at 07:01

## 2021-01-01 RX ADMIN — MIDODRINE HYDROCHLORIDE 10 MG: 10 TABLET ORAL at 08:51

## 2021-01-01 RX ADMIN — ALBUMIN (HUMAN) 25 G: 0.25 INJECTION, SOLUTION INTRAVENOUS at 10:45

## 2021-01-01 RX ADMIN — PIPERACILLIN AND TAZOBACTAM 2.25 G: 2; .25 INJECTION, POWDER, LYOPHILIZED, FOR SOLUTION INTRAVENOUS at 03:33

## 2021-01-01 RX ADMIN — DOCUSATE SODIUM 100 MG: 100 CAPSULE, LIQUID FILLED ORAL at 08:51

## 2021-01-01 RX ADMIN — ALBUMIN (HUMAN) 25 G: 0.25 INJECTION, SOLUTION INTRAVENOUS at 00:03

## 2021-01-01 RX ADMIN — COLLAGENASE SANTYL: 250 OINTMENT TOPICAL at 09:00

## 2021-01-01 RX ADMIN — FAMOTIDINE 10 MG: 20 TABLET ORAL at 20:30

## 2021-01-01 RX ADMIN — Medication 10 ML: at 13:02

## 2021-01-01 RX ADMIN — ACETAMINOPHEN 650 MG: 325 TABLET ORAL at 11:13

## 2021-01-01 RX ADMIN — INSULIN LISPRO 2 UNITS: 100 INJECTION, SOLUTION INTRAVENOUS; SUBCUTANEOUS at 16:30

## 2021-01-01 RX ADMIN — Medication 10 ML: at 06:13

## 2021-01-01 RX ADMIN — DOCUSATE SODIUM 100 MG: 100 CAPSULE, LIQUID FILLED ORAL at 09:07

## 2021-01-01 RX ADMIN — INSULIN LISPRO 4 UNITS: 100 INJECTION, SOLUTION INTRAVENOUS; SUBCUTANEOUS at 13:14

## 2021-01-01 RX ADMIN — Medication 10 ML: at 21:08

## 2021-01-01 RX ADMIN — VANCOMYCIN HYDROCHLORIDE 1000 MG: 1 INJECTION, POWDER, LYOPHILIZED, FOR SOLUTION INTRAVENOUS at 17:52

## 2021-01-01 RX ADMIN — DEXAMETHASONE SODIUM PHOSPHATE 6 MG: 4 INJECTION, SOLUTION INTRAMUSCULAR; INTRAVENOUS at 22:56

## 2021-01-01 RX ADMIN — ALBUMIN (HUMAN) 25 G: 0.25 INJECTION, SOLUTION INTRAVENOUS at 19:32

## 2021-01-01 RX ADMIN — MIDODRINE HYDROCHLORIDE 10 MG: 10 TABLET ORAL at 17:23

## 2021-01-01 RX ADMIN — INSULIN LISPRO 3 UNITS: 100 INJECTION, SOLUTION INTRAVENOUS; SUBCUTANEOUS at 22:23

## 2021-01-01 RX ADMIN — Medication 10 ML: at 14:00

## 2021-01-01 RX ADMIN — MIDODRINE HYDROCHLORIDE 10 MG: 10 TABLET ORAL at 17:32

## 2021-01-01 RX ADMIN — MIDODRINE HYDROCHLORIDE 10 MG: 10 TABLET ORAL at 10:06

## 2021-01-01 RX ADMIN — Medication 5 MG: at 22:55

## 2021-01-01 RX ADMIN — MIDODRINE HYDROCHLORIDE 10 MG: 10 TABLET ORAL at 09:43

## 2021-01-01 RX ADMIN — FLUTICASONE PROPIONATE 2 SPRAY: 50 SPRAY, METERED NASAL at 13:14

## 2021-01-01 RX ADMIN — FAMOTIDINE 10 MG: 20 TABLET ORAL at 21:49

## 2021-01-01 RX ADMIN — MIDODRINE HYDROCHLORIDE 10 MG: 10 TABLET ORAL at 13:01

## 2021-01-01 RX ADMIN — INSULIN LISPRO 2 UNITS: 100 INJECTION, SOLUTION INTRAVENOUS; SUBCUTANEOUS at 17:17

## 2021-03-11 PROBLEM — Z99.2 ESRD (END STAGE RENAL DISEASE) ON DIALYSIS (HCC): Status: ACTIVE | Noted: 2021-01-01

## 2021-03-11 PROBLEM — J90 PLEURAL EFFUSION ON LEFT: Status: ACTIVE | Noted: 2021-01-01

## 2021-03-11 PROBLEM — R09.02 HYPOXIA: Status: ACTIVE | Noted: 2021-01-01

## 2021-03-11 PROBLEM — N18.6 ESRD (END STAGE RENAL DISEASE) ON DIALYSIS (HCC): Status: ACTIVE | Noted: 2021-01-01

## 2021-03-11 PROBLEM — E11.29 DM (DIABETES MELLITUS), TYPE 2 WITH RENAL COMPLICATIONS (HCC): Status: ACTIVE | Noted: 2021-01-01

## 2021-03-12 PROBLEM — J12.82 PNEUMONIA DUE TO 2019 NOVEL CORONAVIRUS: Status: ACTIVE | Noted: 2021-01-01

## 2021-03-12 PROBLEM — I73.9 SEVERE PERIPHERAL ARTERIAL DISEASE (HCC): Status: ACTIVE | Noted: 2021-01-01

## 2021-03-12 PROBLEM — L89.322 DECUBITUS ULCER OF LEFT BUTTOCK, STAGE 2 (HCC): Status: ACTIVE | Noted: 2021-01-01

## 2021-03-12 PROBLEM — I25.10 CAD (CORONARY ARTERY DISEASE): Status: ACTIVE | Noted: 2021-01-01

## 2021-03-12 PROBLEM — U07.1 COVID-19 VIRUS INFECTION: Status: ACTIVE | Noted: 2021-01-01

## 2021-03-12 PROBLEM — D69.6 THROMBOCYTOPENIA (HCC): Status: ACTIVE | Noted: 2021-01-01

## 2021-03-12 PROBLEM — Z89.201: Status: ACTIVE | Noted: 2021-01-01

## 2021-03-12 PROBLEM — I50.9 CHF (CONGESTIVE HEART FAILURE) (HCC): Status: ACTIVE | Noted: 2021-01-01

## 2021-03-12 PROBLEM — R50.9 FEVER: Status: ACTIVE | Noted: 2021-01-01

## 2021-03-12 PROBLEM — Z89.619 AMPUTEE, LOWER LIMB (HCC): Status: ACTIVE | Noted: 2021-01-01

## 2021-03-12 NOTE — DIALYSIS
Haylie Domínguez ACUTE HEMODIALYSIS FLOW SHEET 
 
 
HEMODIALYSIS ORDERS: Physician: Rudi Grady Dialyzer: revaclear   Duration: 3.5 hr  BFR: 350   DFR: 700 Dialysate:  Temp 36-37*C  K+   2    Ca+  2.5 Na 142 Bicarb 35 Weight:  
Wt Readings from Last 1 Encounters:  
03/12/21 60.8 kg (134 lb) UF Goal: 1000  Access left subcl  Needle Gauge Heparin []  Bolus      Units    [] Hourly       Units    [x]None Catheter locking solution Heparin Pre BP:   104/58    Pulse:     82       Respirations: 18  Temperature:   98.2 Labs: Pre        Post:        [x] N/A Additional Orders(medications, blood products, hypotension management):       [x] N/A [x] [facility] Consent Verified CATHETER ACCESS: []N/A   []Right   [x]Left   []IJ     []Fem   []transhepatic  
[] First use X-ray verified     [x]Permanent                [] Temporary  
[x]No S/S infection  []Redness  []Drainage []Cultured []Swelling []Pain  
[x]Medical Aseptic Prep Utilized   []Dressing Changed  [] Biopatch  Date:      
[]Clotted   [x]Patent   Flows: [x]Good  []Poor  []Reversed If access problem,  notified: []Yes    [x]N/A  Date:        
 
GRAFT/FISTULA ACCESS:  [x]N/A     []Right     []Left     []UE     []LE []AVG   []AVF        []Buttonhole    []Medical Aseptic Prep Utilized []No S/S infection  []Redness  []Drainage []Cultured []Swelling []Pain Bruit:   [] Strong    [] Weak       Thrill :   [] Strong    [] Weak Needle Gauge:    Length: If access problem,  notified: []Yes     [x]N/A  Date:       
Please describe access if present and not used:  
            
            GENERAL ASSESSMENT:  
  
LUNGS:  Rate 18 SaO2%        [] N/A    [x] Clear  [] Coarse  [] Crackles  [] Wheezing 
      [] Diminished     Location : []RLL   []LLL    []RUL  []DOLORES Cough: []Productive  []Dry  [x]N/A   Respirations:  [x]Easy  []Labored Therapy:   []RA  [x]NC 3 l/min    Mask: []NRB []Venti       O2%                 []Ventilator []Intubated  [] Trach  [] BiPaP CARDIAC: [x]Regular      [] Irregular   [] Pericardial Rub  [] JVD []  Monitored  [] Bedside  [] Remotely monitored [x] N/A  Rhythm: EDEMA: [] None  [x]Generalized  [] Pitting [] 1    [] 2    [] 3    [] 4 [] Facial  [] Pedal  []  UE  [] LE  
 
SKIN:   [x] Warm  [] Hot     [] Cold   [x] Dry     [] Pale   [] Diaphoretic    
             [] Flushed  [] Jaundiced  [] Cyanotic  [] Rash  [] Weeping LOC:    [x] Alert      [x]Oriented:    [x] Person     [x] Place  []Time 
             [] Confused  [] Lethargic  [] Medicated  [] Non-responsive GI / ABDOMEN:  [] Flat    [] Distended    [x] Soft    [] Firm   []  Obese 
                           [] Diarrhea  [] Bowel Sounds  [] Nausea  [] Vomiting  / URINE ASSESSMENT:[] Voiding   [x] Oliguria  [] Anuria   []  Grimaldo [] Incontinent    []  Incontinent Brief      []  Bathroom Privileges PAIN: [x] 0 []1  []2   []3   []4   []5   []6   []7   []8   []9   []10 Scale 0-10  Action/Follow Up: MOBILITY:  [] Amb    [] Amb/Assist    [x] Bed    [] Wheelchair  [] Stretcher All Vitals and Treatment Details on Attached Flowsheet Hospital: [facility] Room # 350/01 [] 1st Time Acute  [] Stat  [x] Routine  [] Urgent    
[] Acute Room  [x]  Bedside  [] ICU/CCU  [] ER Isolation Precautions:  Droplet Plus Special Considerations:         [] Blood Consent Verified [x]N/A ALLERGIES:  
Allergies Allergen Reactions  Benadryl Extra Strength [Diphenhydramine-Zinc Acetate] Unable to Obtain  Gentamicin Unable to Obtain Code Status:Full Code Hepatitis Status: No results found for: HAMAT, HAAB, HABT, HAAT, HBSAG, HBSB, HBSAT, HBABN, HBCM, HBCAB, HBCAT, [place] , HBEAB, HBEAG, XHEPCS, [id number], [address], [facility], [facility], [address] [place] [address], [id number], [id number], [address] [place] [address], [id number], [facility], [id number], [facility]             Current Labs:  
Lab Results Component Value Date/Time Sodium 141 03/12/2021 05:49 AM  
 Potassium 3.5 03/12/2021 05:49 AM  
 Chloride 107 03/12/2021 05:49 AM  
 CO2 26 03/12/2021 05:49 AM  
 Anion gap 8 03/12/2021 05:49 AM  
 Glucose 87 03/12/2021 05:49 AM  
 BUN 34 (H) 03/12/2021 05:49 AM  
 Creatinine 5.38 (H) 03/12/2021 05:49 AM  
 BUN/Creatinine ratio 6 (L) 03/12/2021 05:49 AM  
 GFR est AA 13 (L) 03/12/2021 05:49 AM  
 GFR est non-AA 11 (L) 03/12/2021 05:49 AM  
 Calcium 7.3 (L) 03/12/2021 05:49 AM  
  
Lab Results Component Value Date/Time WBC 3.3 (L) 03/12/2021 05:49 AM  
 HGB 9.9 (L) 03/12/2021 05:49 AM  
 HCT 32.1 (L) 03/12/2021 05:49 AM  
 PLATELET 41 (L) 59/52/6364 05:49 AM  
 MCV 70.5 (L) 03/12/2021 05:49 AM  
  
  
 
                                                                         
DIET: DIET NUTRITIONAL SUPPLEMENTS 
DIET REGULAR    
 
PRIMARY NURSE REPORT: First initial/Last name/Title Pre Dialysis: TALISHA Rueda  RN     Time: 0879 EDUCATION:   
[x] Patient [] Other         Knowledge Basis: []None [x]Minimal [] Substantial  
Barriers to learning  [x]N/A [x] Access Care     [] S&S of infection     [] Fluid Management     []K+     [x]Procedural   
[]Albumin     [] Medications     [] Tx Options     [] Transplant     [] Diet     [] Other Teaching Tools:  [x] Explain  [] Demo  [] Handouts [] Video Patient response:   [] Verbalized understanding  [] Teach back  [] Return demonstration [x] Requires follow up Inappropriate due to         
 
[x] Time Out/Safety Check  [x]Extracorporeal Circuit Tested for integrity RO/HEMODIALYSIS MACHINE SAFETY CHECKS  Before each treatment:    
Machine Number:                   [facility] [place] [x] Portable Machine #2/RO serial # Alarm Test:  Pass time 2479 [x] RO/Machine Log Complete Temp    36.0 Dialysate: pH  7.4 Conductivity: Meter   13.9     HD Machine 14.1                  TCD: 14.1 Dialyzer Lot # [id number]            Blood Tubing Lot # [id number]          Saline Lot #  [id number] CHLORINE TESTING-Before each treatment and every 4 hours Total Chlorine: [x] less than 0.1 ppm  Time: 1730  4 Hr/2nd Check Time:   
(if greater than 0.1 ppm from Primary then every 30 minutes from Secondary) TREATMENT INITIATION  with Dialysis Precautions:  
[x] All Connections Secured                 [x] Saline Line Double Clamped  
[x] Venous Parameters Set                  [x] Arterial Parameters Set [x] Prime Given 250ml                          [x]Air Foam Detector Engaged Treatment Initiation Note: pt transferred from room 311 to room 350 via bed without complication. Order for Revealer dialyzer &  verified before tx start. No signs of infection present to left chest TDC. Treatment initiated without complication. During Treatment Notes: 5285: pt resting with eyes closed, easily aroused with verbal stimuli, no signs of acute distress present, face and access in view. 1900: Dr. Terry Scot informed of low BP during tx. Phone order received for Albumin 25% as needed for low BP.  
1930: Albumin 25% hung for SBP <90. Pt resting with eyes closed, face and access in view. 2000: pt resting with eyes closed, easily aroused with verbal stimuli, no signs of acute distress present, face and access in view. 2100: pt on phone, no signs of distress present, face and access in view. Medication Dose Volume Route Time [facility] name Title Albumin 25% 25gm 100ml IV 1930 S. Margherita Leyden  RN  
      RN  
      RN  
        RN Post Assessment:  
Dialyzer Cleared: [x] Good [] Fair  [] Poor Blood processed:  57.6 L 
UF Removed  1500 Ml POst BP:   114/89       Pulse: 86 Respirations: 18  Temperature: 97.6 Lungs: 
 
 [x] Clear      [] Course         [] Crackles  
 [] Wheezing         [] Diminished Post Tx Vascular Access: AVF/AVG: Bleeding stopped N/A Cardiac:  
[x] Regular   [] Irregular   [] Monitor  [] N/A Rhythm:      
Catheter:  
Locking solution: Heparin 1:1000 Art. 2.0  Bc. 2.0 Skin:   Pain:   
[x] Warm  [x] Dry [] Diaphoretic    [] Flushed   
[] Pale [] Cyanotic [x]0  []1  []2   []3  []4   []5   []6   []7   []8   []9   []10 Post Treatment Note: treatment completed without further complication, 1 Liter UF removed as tolerated due to low BP. POST TREATMENT PRIMARY NURSE HANDOFF REPORT:  
 
First initial/Last name/Title Post Dialysis: Radha Kapadia RN Time:  2100 Abbreviations: AVG-arterial venous graft, AVF-arterial venous fistula, IJ-Internal Jugular, Subcl-Subclavian, Fem-Femoral, Tx-treatment, AP/HR-apical heart rate, DFR-dialysate flow rate, BFR-blood flow rate, AP-arterial pressure, -venous pressure, UF-ultrafiltrate, TMP-transmembrane pressure, Bc-Venous, Art-Arterial, RO-Reverse Osmosis

## 2021-03-12 NOTE — PROGRESS NOTES
Pharmacy Dosing Services: Vancomycin Consult for Vancomycin Dosing by Pharmacy by Dr. Amanda Geller provided for this 64y.o. year old male , for indication of Empiric therapy. Day of Therapy 1 Pt receives dialysis on Mon/ Wed / Fri 
Vancomycin 1000 mg IV administered 3/12/21 at 16:27 Pt having dialysis today (3/12/21) after Vancomycin dose. Ordered next dose:  Vancomycin 500 mg IV once, scheduled 3/13/21 at 12:00 Goal therapeutic trough: 15 - 20 mcg/mL. Pharmacy to follow daily and will make changes to dose and/or frequency based on clinical status. Pharmacist Jesse Fatima

## 2021-03-12 NOTE — ROUTINE PROCESS
Nilesh from lab reports rapid covid test positive. Primary nurse and Dr. dEdie Peres aware. Orders to transfer patient to the cohort.

## 2021-03-12 NOTE — PROGRESS NOTES
Problem: Pressure Injury - Risk of 
Goal: *Prevention of pressure injury Description: Document Lake Scale and appropriate interventions in the flowsheet. Outcome: Progressing Towards Goal 
Note: Pressure Injury Interventions: 
  
 
Moisture Interventions: Apply protective barrier, creams and emollients Activity Interventions: Pressure redistribution bed/mattress(bed type) Mobility Interventions: Pressure redistribution bed/mattress (bed type), HOB 30 degrees or less Nutrition Interventions: Document food/fluid/supplement intake Friction and Shear Interventions: Foam dressings/transparent film/skin sealants Problem: Patient Education: Go to Patient Education Activity Goal: Patient/Family Education Outcome: Progressing Towards Goal 
  
Problem: Falls - Risk of 
Goal: *Absence of Falls Description: Document Mercedes Minium Fall Risk and appropriate interventions in the flowsheet. Outcome: Progressing Towards Goal 
Note: Fall Risk Interventions: 
  
 
  
 
Medication Interventions: Bed/chair exit alarm Elimination Interventions: Bed/chair exit alarm Problem: Patient Education: Go to Patient Education Activity Goal: Patient/Family Education Outcome: Progressing Towards Goal

## 2021-03-12 NOTE — PROGRESS NOTES
Hospitalist Progress Note-critical care note Patient: Bon Teixeira MRN: [id number]  CSN: [id number] YOB: 1960  Age: 64 y.o. Sex: male DOA: 3/11/2021 LOS:  LOS: 1 day Chief complaint: fever, chf, decubitus ulcer ,  thrombocytopenia , chf , esrd on hd pleural effusion Assessment/Plan Hospital Problems  Date Reviewed: 3/11/2021 Codes Class Noted POA  
 CAD (coronary artery disease) ICD-10-CM: I25.10 ICD-9-CM: 414.00  3/12/2021 Yes Severe peripheral arterial disease (HCC) ICD-10-CM: I73.9 ICD-9-CM: 443.9  3/12/2021 Yes Amputee, lower limb ([facility] ICD-10-CM: K92.494 ICD-9-CM: V49.70  3/12/2021 Yes Amputee, upper limb, right ICD-10-CM: Z89.201 ICD-9-CM: V49.60  3/12/2021 Yes  
   
 CHF (congestive heart failure) (HCC) ICD-10-CM: I50.9 ICD-9-CM: 428.0  3/12/2021 Yes Thrombocytopenia ([facility] ICD-10-CM: D69.6 ICD-9-CM: 287.5  3/12/2021 Yes Decubitus ulcer of left buttock, stage 2 ([facility] ICD-10-CM: H82.659 ICD-9-CM: 707.05, 707.22  3/12/2021 Yes Fever ICD-10-CM: R50.9 ICD-9-CM: 780.60  3/12/2021 Yes * (Principal) Hypoxia ICD-10-CM: R09.02 
ICD-9-CM: 799.02  3/11/2021 Yes Pleural effusion on left ICD-10-CM: J90 ICD-9-CM: 511.9  3/11/2021 Yes DM (diabetes mellitus), type 2 with renal complications (HCC) BBQ-71-GB: E11.29 ICD-9-CM: 250.40  3/11/2021 Yes ESRD (end stage renal disease) on dialysis ([facility] ICD-10-CM: N18.6, Z99.2 ICD-9-CM: 585.6, V45.11  3/11/2021 Yes 59-year-old male with type 2 diabetes mellitus with end-stage renal disease on hemodialysis, severe peripheral arterial disease with 3 extremity amputations, CAD, thrombocytopenia, CHF, and CAD is admitted with hypoxia, fever, and left sided pleural effusion. 
  
Hypoxialike fluid overloaded Need hd Tested in sentera-negative Will have rapid one Fever- 
Blood cx still pending So far no fever after admission Need to r/o bacteremia due to high risk  
 
  
Pleural effusion on left with layering Will have ct for evaluation Will tap if large effusion ESRD on hemodialysis with left chest wall TDC in place 
dialysis Monday/Wednesday/Friday at the [place]. Need hd today, dr. Soy Tellez is consulted per Dr. Brennan Spine was 55%  
ce wnl Echo pending  
  Thrombocytopenia with elevated inr and low albumin -like cirrhosis picture   
Will hold aspirin now So far no bleeding noted Abdomen ultrasound Decubitus ulcer of the left buttock, stage II 
Wound care consult 
  
Type 2 diabetes mellitus Sliding scale insulin 
  
CAD Continue home medications 
  
Severe peripheral arterial disease with triple amputation Fall precaution Subjective : no fever, no sob , it is my hd day. I need help for breakfast 
  
Addendum rapid covid 19 positive ,ct chest pna, transfer to cohort unit, not a candidate for remdisivir due to renal function, . Iv steroid-due to high risk of bleeding with po form . Request plasma , poor prognosis Palliative care consult put in  
 45 total min's spent on patient care including >50% on counseling/coordinating care. Discussed the above assessments. also discussed labs, medications and hospital course Disposition :tbd, Review of systems: 
 
General: No fevers or chills. Cardiovascular: No chest pain or pressure. No palpitations. Pulmonary: No shortness of breath. Gastrointestinal: No nausea, vomiting. Vital signs/Intake and Output: 
Visit Vitals BP (!) 102/56 (BP 1 Location: Right upper arm, BP Patient Position: At rest) Pulse 88 Temp 97.6 °F (36.4 °C) Resp 16 Ht 5' (1.524 m) Wt 60.8 kg (134 lb) SpO2 100% BMI 26.17 kg/m² Current Shift:  03/12 0701 - 03/12 1900 In: 120 [P.O.:120] Out: - Last three shifts:  No intake/output data recorded. Physical Exam: 
General: WD, WN. Alert, cooperative, no acute distress HEENT: NC, Atraumatic. PERRLA, anicteric sclerae. Lungs: CTA Bilaterally. No Wheezing/Rhonchi/Rales. tdc site is good skin no sign of infection Heart:  Regular  rhythm,  + murmur, No Rubs, No Gallops Abdomen: Soft, Non distended, Non tender. +Bowel sounds, Extremities: No c/c, rt bka, left aka , rt hand amputated, left 5th finger amputated Psych:   Not anxious or agitated. Neurologic:  No acute neurological deficit. Labs: Results:  
   
Chemistry Recent Labs  
  03/12/21 0549 03/11/21 2154 GLU 87 102*  140  
K 3.5 3.7  107 CO2 26 25 BUN 34* 30* CREA 5.38* 4.79* CA 7.3* 7.4* AGAP 8 8 BUCR 6* 6* * 538* TP 5.8* 5.9* ALB 1.6* 1.8*  
GLOB 4.2* 4.1* AGRAT 0.4* 0.4* CBC w/Diff Recent Labs  
  03/12/21 0549 03/11/21 2154 WBC 3.3* 3.2*  
RBC 4.55* 4.77 HGB 9.9* 10.7* HCT 32.1* 33.2*  
PLT 41* 49* GRANS  --  62  
LYMPH  --  19* EOS  --  3 Cardiac Enzymes Recent Labs  
  03/12/21 0549 03/11/21 2154 CPK 83 66 CKND1 2.3 2.4 Coagulation Recent Labs  
  03/11/21 2154 PTP 21.8* INR 2.0* APTT 55.8* Lipid Panel No results found for: CHOL, CHOLPOCT, CHOLX, CHLST, CHOLV, [id number], HDL, HDLP, LDL, LDLC, DLDLP, [id number], VLDLC, VLDL, TGLX, TRIGL, TRIGP, TGLPOCT, CHHD, CHHDX  
BNP No results for input(s): BNPP in the last 72 hours. Liver Enzymes Recent Labs  
  03/12/21 0549  
TP 5.8* ALB 1.6* * Thyroid Studies No results found for: T4, T3U, TSH, TSHEXT Procedures/imaging: see electronic medical records for all procedures/Xrays and details which were not copied into this note but were reviewed prior to creation of Plan Xr Chest Arby Mikaylaless Result Date: 3/11/2021 EXAM: XR CHEST PORT CLINICAL INDICATION/HISTORY: meets SIRS criteria -Additional: None COMPARISON: None TECHNIQUE: Portable frontal view of the chest _______________ FINDINGS: SUPPORT DEVICES: Left chest wall tunneled dialysis catheter tip at the cavoatrial junction. HEART AND MEDIASTINUM: Prior median sternotomy and CABG. Cardiomediastinal silhouette within normal limits. LUNGS AND PLEURAL SPACES: Small layering left effusion. No dense consolidation or pneumothorax. _______________ Small layering left effusion.   
 
 
Vladimir Thomas MD

## 2021-03-12 NOTE — PROGRESS NOTES
Pharmacy Renal Dosing Services: 
 
Famotidine was automatically dose-adjusted per THE [facility] P&T Committee Protocol, with respect to renal function. Consult provided for this   64 y.o. , male , for the indication of SUP. Dose adjusted to:  famotidine 10 mg po every evening. Pt Weight:  
Wt Readings from Last 1 Encounters:  
03/12/21 60.8 kg (134 lb) Previous Regimen   Famotidine 20 mg PO twice daily Serum Creatinine Lab Results Component Value Date/Time Creatinine 5.38 (H) 03/12/2021 05:49 AM  
   
Creatinine Clearance Estimated Creatinine Clearance: 11.5 mL/min (A) (based on SCr of 5.38 mg/dL (H)). BUN Lab Results Component Value Date/Time BUN 34 (H) 03/12/2021 05:49 AM  
   
 
Pharmacy to continue to monitor patient daily. Will make dosage adjustments based upon changing renal function. Signed Farrukh Paulino information:  [phone]

## 2021-03-12 NOTE — H&P
History & Physical 
 
Patient: Lizzie Kellogg MRN: [id number]  CSN: [id number] YOB: 1960  Age: 64 y.o. Sex: male DOA: 3/11/2021 Primary Care Provider:  Subha, MD Christiano 
 
 
Assessment/Plan Patient Active Problem List  
Diagnosis Code  Hypoxia R09.02  
 Pleural effusion on left J90  
 DM (diabetes mellitus), type 2 with renal complications ([facility]) Q72.40  
 ESRD (end stage renal disease) on dialysis ([facility]) N18.6, Z99.2  CAD (coronary artery disease) I25.10  Severe peripheral arterial disease ([facility]) I73.9  Amputee, lower limb ([facility] [id number]  Amputee, upper limb, right Z89. 12  
 CHF (congestive heart failure) ([facility]) I50.9  Thrombocytopenia ([facility] D69.6  Decubitus ulcer of left buttock, stage 2 ([address] I06.618  Fever R489  
 
82-year-old male with type 2 diabetes mellitus with end-stage renal disease on hemodialysis, severe peripheral arterial disease with 3 extremity amputations, CAD, thrombocytopenia, CHF, and CAD is admitted with hypoxia, fever, and left sided pleural effusion. Hypoxianew onset, no previous oxygen requirement Supplemental oxygen Fever-will do workup for source, possibly bacteremia or infection related to pleural effusion 
-Follow up blood culture Pleural effusion on left with layeringmay be due to to a CHF exacerbation or related to relative volume overload secondary to incomplete dialysis for the past week. The effusion is new in the past 3 days since he was seen at [facility]. Rocephin and azithromycin for empiric antibiotic treatment especially given fever Consider repeat chest x-ray after dialysis -May require thoracentesis for further analysis but this would be risky given his comorbidities and the effusion is small ESRD on hemodialysis with left chest wall TDC in placereceives dialysis Monday/Wednesday/Friday at the [place]. He is on midodrine for chronically lower blood pressures. Renal diet Nephrology consult Consider culturing the [facility] if his blood cultures are positive as this may be a possible source of infection CHFEF was 55% last year, this could possibly be a CHF exacerbation Echocardiogram 
Trend troponins Dialysis in the morning Thrombocytopenia, chronic with acute worsening possibly due to infection. His platelets normally are between 80 and 100,000 upon lab review for the past few months. Avoid anticoagulation due to platelets less than 01,693 Trend platelets Consider heme-onc consult if not improving Decubitus ulcer of the left buttock, stage II Santyl to wound Wound care consult Type 2 diabetes mellitus Diabetic diet Follow-up hemoglobin A1c Sliding scale insulin CAD Continue home medications Severe peripheral arterial disease with triple amputation Full code Prophylaxis: no SCDs due to amputee status, pepcid PO, no anticoagulation due to thrombocytopenia Estimated length of stay : 3-4 nights Bakari Monahan MD 
Nocturnist 
--------------------------------------------------------------------------------------------------------------------------------------------------------------------------------------------------------------- 
CC: Transferred for fever and hypoxia HPI:  
 
Lizzie Kellogg is a 64 y.o. male with type 2 diabetes mellitus with end-stage renal disease on hemodialysis, severe peripheral arterial disease with 3 extremity amputations, CAD, thrombocytopenia, CHF, and CAD who was transferred from [facility] for fever and hypoxia. He has been seen by 3 separate emergency departments with last facility being [facility] on 3/8 for possible sepsis. He has had sepsis before and thinks he may have it again. He says he has had intermittent fevers for a week and he has not been able to complete a full dialysis session this past week either. He receives dialysis at the [place].   He denies any chest pain but has had some shortness of breath and cough. He has had no nausea, vomiting, or diarrhea. He was tested at [facility] for Covid 3 days ago, which was negative. Past Medical History:  
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  
Procedure Laterality Date  HX ORTHOPAEDIC    
 triple amputee  [place] CARDIAC SURG PROCEDURE UNLIST  VASCULAR SURGERY PROCEDURE UNLIST Family History Problem Relation Age of Onset  Dementia Mother  Hypertension Mother  Diabetes Father  Hypertension Father  Diabetes Sister Social History Socioeconomic History  Marital status: SINGLE Spouse name: Not on file  Number of children: Not on file  Years of education: Not on file  Highest education level: Not on file Tobacco Use  Smoking status: Never Smoker Substance and Sexual Activity  Alcohol use: Not Currently  Drug use: Not Currently  Sexual activity: Not Currently Other Topics Concern Prior to Admission medications Not on File Allergies Allergen Reactions  Benadryl Extra Strength [Diphenhydramine-Zinc Acetate] Unable to Obtain  Gentamicin Unable to Obtain Review of Systems Gen: +fever, chills, malaise Heent: No headache, rhinorrhea, epistaxis, ear pain, hearing loss, sinus pain, neck pain/stiffness, sore throat. Heart: No chest pain, palpitations, BAIRES, pnd, or orthopnea. Resp: No cough, hemoptysis, wheezing; +shortness of breath. GI: No nausea, vomiting, diarrhea, constipation, melena or hematochezia. : Anuric. Derm: left buttocks wound Musc/skeletal: triple amputee Vasc: No edema, cyanosis or claudication. Endo: No heat/cold intolerance, no polyuria,polydipsia or polyphagia. Neuro: No unilateral weakness, numbness, tingling. No seizures. Heme: No easy bruising or bleeding. Physical Exam:  
 
Physical Exam: 
Visit Vitals BP (!) 99/42 Pulse 87 Temp 97.4 °F (36.3 °C) Resp 17 Ht 5' (1.524 m) Wt 60.8 kg (134 lb) SpO2 99% BMI 26.17 kg/m² O2 Device: Room air Temp (24hrs), Av.9 °F (36.6 °C), Min:97.4 °F (36.3 °C), Max:98.3 °F (36.8 °C) No intake/output data recorded. No intake/output data recorded. General:  Awake, cooperative, no distress. Head:  Normocephalic, without obvious abnormality, atraumatic. Eyes:  Conjunctivae/corneas clear, sclera anicteric. Neck: Supple, symmetrical, trachea midline. Lungs:   Bilateral rales in lower 1/3 of lung field Heart:  Regular rate and rhythm, S1, S2 normal, no murmur, click, rub or gallop. Abdomen: Soft, non-tender. Bowel sounds normal. No masses,  No organomegaly. Extremities: Right upper extremity and bilateral lower extremity amputations (left AKA, right AKA). Left finger amputation. Capillary refill normal.  
Pulses: 2+ and symmetric all extremities. Skin: Skin color pink, turgor normal. Scars on left upper extremity that are well-healed. Stage II left buttocks decubitus ulcer.   
Neurologic: No new focal motor or sensory deficit. Labs Reviewed: All lab results for the last 24 hours reviewed. Recent Results (from the past 24 hour(s)) EKG, 12 LEAD, INITIAL Collection Time: 03/11/21  9:47 PM  
Result Value Ref Range Ventricular Rate 95 BPM  
 Atrial Rate 95 BPM  
 P-R Interval 152 ms QRS Duration 88 ms Q-T Interval 316 ms  
 QTC Calculation (Bezet) 397 ms Calculated P Axis 69 degrees Calculated R Axis 31 degrees Calculated T Axis 24 degrees Diagnosis Normal sinus rhythm Low voltage QRS Nonspecific T wave abnormality Abnormal ECG No previous ECGs available CBC WITH AUTOMATED DIFF Collection Time: 03/11/21  9:54 PM  
Result Value Ref Range WBC 3.2 (L) 4.6 - 13.2 K/uL  
 RBC 4.77 4.70 - 5.50 M/uL  
 HGB 10.7 (L) 13.0 - 16.0 g/dL HCT 33.2 (L) 36.0 - 48.0 % MCV 69.6 (L) 74.0 - 97.0 FL  
 MCH 22.4 (L) 24.0 - 34.0 PG  
 MCHC 32.2 31.0 - 37.0 g/dL  
 RDW 19.8 (H) 11.6 - 14.5 % PLATELET 49 (L) 346 - 420 K/uL NEUTROPHILS 62 42 - 75 % BAND NEUTROPHILS 3 0 - 5 % LYMPHOCYTES 19 (L) 20 - 51 % MONOCYTES 13 (H) 2 - 9 % EOSINOPHILS 3 0 - 5 % BASOPHILS 0 0 - 3 %  
 ABS. NEUTROPHILS 2.0 1.8 - 8.0 K/UL  
 ABS. LYMPHOCYTES 0.6 (L) 0.8 - 3.5 K/UL  
 ABS. MONOCYTES 0.4 0 - 1.0 K/UL  
 ABS. EOSINOPHILS 0.1 0.0 - 0.4 K/UL  
 ABS. BASOPHILS 0.0 0.0 - 0.1 K/UL PLATELET COMMENTS LARGE PLATELETS    
 RBC COMMENTS ANISOCYTOSIS 2+ 
    
 RBC COMMENTS MICROCYTOSIS 2+ 
    
 RBC COMMENTS HYPOCHROMIA 2+ 
    
 RBC COMMENTS POLYCHROMASIA 1+ 
    
 RBC COMMENTS OVALOCYTES 2+ 
    
 RBC COMMENTS     
  TARGET CELLS 
OCCASIONAL 
SCHISTOCYTES 
OCCASIONAL 
  
 DF MANUAL METABOLIC PANEL, COMPREHENSIVE Collection Time: 03/11/21  9:54 PM  
Result Value Ref Range Sodium 140 136 - 145 mmol/L Potassium 3.7 3.5 - 5.5 mmol/L Chloride 107 100 - 111 mmol/L  
 CO2 25 21 - 32 mmol/L Anion gap 8 3.0 - 18 mmol/L Glucose 102 (H) 74 - 99 mg/dL  BUN 30 (H) 7.0 - 18 MG/DL  
 Creatinine 4.79 (H) 0.6 - 1.3 MG/DL  
 BUN/Creatinine ratio 6 (L) 12 - 20 GFR est AA 15 (L) >60 ml/min/1.73m2 GFR est non-AA 13 (L) >60 ml/min/1.73m2 Calcium 7.4 (L) 8.5 - 10.1 MG/DL Bilirubin, total 1.0 0.2 - 1.0 MG/DL  
 ALT (SGPT) 54 16 - 61 U/L  
 AST (SGOT) 106 (H) 10 - 38 U/L Alk. phosphatase 538 (H) 45 - 117 U/L Protein, total 5.9 (L) 6.4 - 8.2 g/dL Albumin 1.8 (L) 3.4 - 5.0 g/dL Globulin 4.1 (H) 2.0 - 4.0 g/dL A-G Ratio 0.4 (L) 0.8 - 1.7 MAGNESIUM Collection Time: 03/11/21  9:54 PM  
Result Value Ref Range Magnesium 1.7 1.6 - 2.6 mg/dL PHOSPHORUS Collection Time: 03/11/21  9:54 PM  
Result Value Ref Range Phosphorus 3.7 2.5 - 4.9 MG/DL PROTHROMBIN TIME + INR Collection Time: 03/11/21  9:54 PM  
Result Value Ref Range Prothrombin time 21.8 (H) 11.5 - 15.2 sec INR 2.0 (H) 0.8 - 1.2 PTT Collection Time: 03/11/21  9:54 PM  
Result Value Ref Range aPTT 55.8 (H) 23.0 - 36.4 SEC NT-PRO BNP Collection Time: 03/11/21  9:54 PM  
Result Value Ref Range NT pro-BNP 34,744 (H) 0 - 900 PG/ML  
CARDIAC PANEL,(CK, CKMB & TROPONIN) Collection Time: 03/11/21  9:54 PM  
Result Value Ref Range CK - MB 1.6 <3.6 ng/ml CK-MB Index 2.4 0.0 - 4.0 % CK 66 39 - 308 U/L Troponin-I, QT 0.02 0.0 - 0.045 NG/ML  
POC LACTIC ACID Collection Time: 03/11/21 10:05 PM  
Result Value Ref Range Lactic Acid (POC) 1.42 0.40 - 2.00 mmol/L  
POC G3 Collection Time: 03/11/21 10:06 PM  
Result Value Ref Range Device: ROOM AIR    
 FIO2 (POC) 0.21 % pH (POC) 7.44 7.35 - 7.45    
 pCO2 (POC) 34.6 (L) 35.0 - 45.0 MMHG  
 pO2 (POC) 57 (L) 80 - 100 MMHG  
 HCO3 (POC) 23.3 22 - 26 MMOL/L  
 sO2 (POC) 90 (L) 92 - 97 % Base deficit (POC) 1 mmol/L Allens test (POC) N/A Total resp. rate 15 Site LEFT BRACHIAL Patient temp. 98.3 Specimen type (POC) ARTERIAL Performed by Valdez Hester Results XR CHEST PORT (Accession [id number]) (Order [id number]) Allergies    
Not Specified: Benadryl Extra Strength [Diphenhydramine-zinc Acetate]; Gentamicin Exam Information Status Exam Begun  Exam Ended Final [99] 3/11/2021 22:08 3/11/2021 10:15 PM [id number] 10:15 PM  
Result Information Status: Final result (Exam End: 3/11/2021 22:15) Provider Status: Open Study Result EXAM: XR CHEST PORT 
  
CLINICAL INDICATION/HISTORY: meets SIRS criteria 
-Additional: None 
  
COMPARISON: None 
  
TECHNIQUE: Portable frontal view of the chest 
  
_______________ 
  
FINDINGS: 
  
SUPPORT DEVICES: Left chest wall tunneled dialysis catheter tip at the 
cavoatrial junction. 
  
HEART AND MEDIASTINUM: Prior median sternotomy and CABG. Cardiomediastinal 
silhouette within normal limits. 
  
LUNGS AND PLEURAL SPACES: Small layering left effusion. No dense consolidation 
or pneumothorax. 
  
_______________ 
  
IMPRESSION 
  
Small layering left effusion.

## 2021-03-12 NOTE — PROGRESS NOTES
Pharmacy Dosing Services Abiel Payton is a 64y.o. year old male receiving Ceftriaxone for CAP. Per [facility] Policy this patient's Ceftriaxone dose has been changed to Ceftriaxone 1 gm IV every 24 hours. Height:  
Ht Readings from Last 1 Encounters:  
21 152.4 cm (60\") Weight:  
Wt Readings from Last 1 Encounters:  
21 60.8 kg (134 lb) Estimated Creatinine Clearance: 11.5 mL/min (A) (based on SCr of 5.38 mg/dL (H)). Pt received dialysis Current Antimicrobial Therapy (168h ago, onward) Ordered     Start Stop  
 21 1450  cefTRIAXone (ROCEPHIN) 1 g in sterile water (preservative free) 10 mL IV syringe  1 g,   IntraVENous,   EVERY 24 HOURS    
 21 2200 --  
 21 1444  vancomycin (VANCOCIN) 1,000 mg in 0.9% sodium chloride 250 mL (VIAL-MATE)  1,000 mg,   IntraVENous,   ONCE    
 21 1500 21 0259  
 21 2209  azithromycin (ZITHROMAX) 500 mg in 0.9% sodium chloride 250 mL (VIAL-MATE)  500 mg,   IntraVENous,   EVERY 24 HOURS    
 21 --  
 
  
  
 
  
 
 
Culture result:  
Date Value Ref Range Status 2021 NO GROWTH AFTER 9 HOURS   Preliminary 2021 NO GROWTH AFTER 9 HOURS   Preliminary Temp (24hrs), Av.7 °F (36.5 °C), Min:97.4 °F (36.3 °C), Max:98.3 °F (36.8 °C) Recent Labs  
  21 
0549 21 
2154 WBC 3.3* 3.2* DOSING CHART: 
 
*May consider 2G IV dose for gram negative infections Site and Type of Infection Dose and Route Frequency Urinary Tract Infection 1G IV Q 24 hours Bacteremia 1-2G IV* Q 24 hours Upper Respiratory Tract Infection (if appropriate) 1G IV Q 24 hours Skin and Soft Tissue Infection 1G IV Q 24 hours Intra-abdominal Infection    (usually add metronidazole) 1G IV Q 24 hours Gonorrhea                          (usually with Azithromycin or Doxycycline to cover Chlamydia) 250mg IM  
1G (disseminated) Once Q 24 hours (disseminated) Endocarditis 2G IV Q 24 hours (may use q 12 hours with ampicillin for Enterococcus sp.) Meningitis and CNS infections 2G IV Q 12 hours Pneumonia 1G IV Q 24 hours Osteomyelitis or prosthetic joint infections 2G IV  Q 24 hours Patients excluded from an automatic dose change are as follows: - Doses ordered by an infectious diseases physician. If appropriate, the pharmacist will call the physician about concerns. - Patients weighing > 100 kg- pharmacist may use their professional judgment for specific patient dosing - Patient has symptoms consistent with, or diagnosis of meningitis, CNS infection, endocarditis or osteomyelitis. - 
- Pediatric patients Aileen Morris, [address]

## 2021-03-12 NOTE — PROGRESS NOTES
Pharmacy Dosing Services: Vancomycin Consult for Vancomycin Dosing by Pharmacy by Dr. Urszula Smallwood provided for this 64y.o. year old male , for indication of Empiric therapy. Day of Therapy 1 Ht Readings from Last 1 Encounters:  
03/12/21 152.4 cm (60\") Wt Readings from Last 1 Encounters:  
03/12/21 60.8 kg (134 lb) Other Current Antibiotics Azithromycin / Ceftriaxone Significant Cultures pending Serum Creatinine Lab Results Component Value Date/Time Creatinine 5.38 (H) 03/12/2021 05:49 AM  
  
Creatinine Clearance Estimated Creatinine Clearance: 11.5 mL/min (A) (based on SCr of 5.38 mg/dL (H)). BUN Lab Results Component Value Date/Time BUN 34 (H) 03/12/2021 05:49 AM  
  
WBC Lab Results Component Value Date/Time WBC 3.3 (L) 03/12/2021 05:49 AM  
  
H/H Lab Results Component Value Date/Time HGB 9.9 (L) 03/12/2021 05:49 AM  
  
Platelets Lab Results Component Value Date/Time PLATELET 41 (L) 34/14/2303 05:49 AM  
  
Temp 97.6 °F (36.4 °C) Start Vancomycin therapy, with loading dose:  Vancomycin 1000 mg IV once, scheduled for 3/12/21. Pt receives dialysis on Mon / Wed/ Fri  
 
Goal therapeutic trough: 15 - 20 mcg/mL. Pharmacy to follow daily and will make changes to dose and/or frequency based on clinical status. Pharmacist Mart Lombard, [address] [facility]

## 2021-03-12 NOTE — ED TRIAGE NOTES
Patient brought to ED by [facility] from [facility] c/o possible sepsis. Spoke with RN, Freddy Vallejo at facility which states patient requested to come to ED to have sepsis ruled out. Patient has been seen at multiple facilities and discharged. Pt has hx of hypotension, and DM, also bilateral amputee.

## 2021-03-12 NOTE — CONSULTS
RENAL CONSULT 
3/12/2021 Patient:  Abiel Payton :  1960 Gender:  male MRN #:  [id number] Consulting Physician:  Roz Rashid DO, Assessment:   
)Principal Problem: Hypoxia (3/11/2021) Active Problems: 
  Pleural effusion on left (3/11/2021) DM (diabetes mellitus), type 2 with renal complications ([address] (3/44/1455) ESRD (end stage renal disease) on dialysis ([address] (3/11/2021) CAD (coronary artery disease) (3/12/2021) Severe peripheral arterial disease ([address] (3/12/2021) Amputee, lower limb ([address] (3/12/2021) Amputee, upper limb, right (3/12/2021) CHF (congestive heart failure) ([address] (3/12/2021) Thrombocytopenia ([address] (3/12/2021) Decubitus ulcer of left buttock, stage 2 ([address] (3/12/2021) Fever (3/12/2021) Plan:   
HD today gently Regular diet given his malnutrition and low BPs Will follow with you awaiting infection work up History of Present Illness: Abiel Payton is a 64y.o. year old male with ongoing ESRD who is gets HD at [facility], has had issues with SOB. His catheter is not functioning as much He is on midodrine I was asked to see He has pleural effussion Past Medical History:  
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  Obstructive sleep apnea  Pancytopenia ([facility]  Paroxysmal atrial fibrillation (HCC)  Peripheral vascular disease ([facility]  Presence of aortocoronary bypass graft  Thrombocytopenia ([facility]  Type 2 diabetes mellitus ([facility] Past Surgical History:  
Procedure Laterality Date  HX ORTHOPAEDIC    
 triple amputee  [place] CARDIAC SURG PROCEDURE UNLIST  VASCULAR SURGERY PROCEDURE UNLIST Family History Problem Relation Age of Onset  Dementia Mother  Hypertension Mother  Diabetes Father  Hypertension Father  Diabetes Sister Allergies Allergen Reactions  Benadryl Extra Strength [Diphenhydramine-Zinc Acetate] Unable to Obtain  Gentamicin Unable to Obtain Current Facility-Administered Medications Medication Dose Route Frequency Provider Last Rate Last Admin  aspirin chewable tablet 81 mg  81 mg Oral DAILY Estelle Osuna MD   81 mg at 03/12/21 2106  collagenase (SANTYL) 250 unit/gram ointment   Topical DAILY Estelle Osuna MD   Given at 03/12/21 9718  docusate sodium (COLACE) capsule 100 mg  100 mg Oral DAILY Estelle Osuna MD   100 mg at 03/12/21 9990  polyvinyl alcohol-povidon(PF) (REFRESH CLASSIC) 1.4-0.6 % ophthalmic solution 1 Drop  1 Drop Both Eyes PRN Estelle Osuna MD      
 midodrine (PROAMATINE) tablet 10 mg  10 mg Oral TID WITH MEALS Estelle Osuna MD   10 mg at 03/12/21 1118  traZODone (DESYREL) tablet 50 mg  50 mg Oral QHS Estelle Osuna MD      
 diclofenac (VOLTAREN) 1 % topical gel 2 g  2 g Topical DAILY PRN Estelle Osuna MD      
 HYDROcodone-acetaminophen [facility]) 5-325 mg per tablet 1 Tab  1 Tab Oral Q8H PRN Estelle Osuna MD   1 Tab at 03/12/21 4327  fluticasone propionate (FLONASE) 50 mcg/actuation nasal spray 2 Spray  2 Spray Both Nostrils DAILY Estelle Osuna MD   2 Spray at 03/12/21 0151  vit B Cmplx 3-FA-Vit C-Biotin (NEPHRO MIRACLE RX) tablet 1 Tab  1 Tab Oral DAILY Jim Hunter Lares MD   1 Tab at 03/12/21 7385  sodium chloride (NS) flush 5-10 mL  5-10 mL IntraVENous PRN Duane Grady MD      
 cefTRIAXone (ROCEPHIN) 2 g in sterile water (preservative free) 20 mL IV syringe  2 g IntraVENous Q24H Melany Grady MD   2 g at 03/11/21 2213  
 azithromycin (ZITHROMAX) 500 mg in 0.9% sodium chloride 250 mL (VIAL-MATE)  500 mg IntraVENous Q24H Duane Grady MD   500 mg at 03/11/21 2236  sodium chloride (NS) flush 5-40 mL  5-40 mL IntraVENous Q8H Robel Dubois MD   10 mL at 03/12/21 0600  
 sodium chloride (NS) flush 5-40 mL  5-40 mL IntraVENous PRN Robel Dubois MD      
 acetaminophen (TYLENOL) tablet 650 mg  650 mg Oral Q6H PRN Robel Dubois MD      
 Or  
 acetaminophen (TYLENOL) suppository 650 mg  650 mg Rectal Q6H PRN Robel Dubois MD      
 polyethylene glycol (MIRALAX) packet 17 g  17 g Oral DAILY PRN Robel Dubois MD      
 promethazine (PHENERGAN) tablet 12.5 mg  12.5 mg Oral Q6H PRN Robel Dubois MD      
 Or  
 [facility] injection 4 mg  4 mg IntraVENous Q6H PRN Robel Dubois MD      
 famotidine (PEPCID) tablet 20 mg  20 mg Oral BID Robel Dubois MD   20 mg at 03/12/21 0851  
 insulin lispro (HUMALOG) injection   SubCUTAneous AC&HS Robel Dubois MD   Stopped at 03/12/21 0730  
 glucose chewable tablet 16 g  16 g Oral PRN Robel Dubois MD      
 glucagon (GLUCAGEN) injection 1 mg  1 mg IntraMUSCular PRN Robel Dubois MD      
 dextrose 10% infusion 125-250 mL  125-250 mL IntraVENous PRN Robel Dubios MD      
 
 
Review of Symptoms:  Below symptoms negative if not in [place]. Consitutional Symptoms: Fever, weight loss, weight gain, fatigue Eyes:  pain, sudden vision loss, blurry vision, double vision 
           bleeding nose, sore throat, hoarseness GI: nausea, vomiting, diarrhea, pain, blood in stool Pulmonary; Cough, Shortness of breath, Wheezing's 
Cardiac: chest pain, palpitation Musculoskeletal: difficulty walking, falls, pain over muscle, joint pain, weakness :  anuric Neurologia: dizziness, syncope, focal weakness, difficulty speaking Integumentary: rash, redness, ulcer Psychiatric:  Depression suicidal ideation Objective: 
Visit Vitals BP (!) 102/56 (BP 1 Location: Right upper arm, BP Patient Position: At rest) Pulse 88 Temp 97.6 °F (36.4 °C) Resp 16 Ht 5' (1.524 m) Wt 60.8 kg (134 lb) SpO2 100% BMI 26.17 kg/m² He appears older than stated age Appears frail Laboratory Data: 
Lab Results Component Value Date BUN 34 (H) 03/12/2021 BUN 30 (H) 03/11/2021  03/12/2021  03/11/2021 CO2 26 03/12/2021 CO2 25 03/11/2021 Lab Results Component Value Date WBC 3.3 (L) 03/12/2021 HGB 9.9 (L) 03/12/2021 HCT 32.1 (L) 03/12/2021 Imaging have been reviewed: 
)Xr Chest [facility] Result Date: 3/11/2021 EXAM: XR CHEST PORT CLINICAL INDICATION/HISTORY: meets SIRS criteria -Additional: None COMPARISON: None TECHNIQUE: Portable frontal view of the chest  FINDINGS: SUPPORT DEVICES: Left chest wall tunneled dialysis catheter tip at the cavoatrial junction. HEART AND MEDIASTINUM: Prior median sternotomy and CABG. Cardiomediastinal silhouette within normal limits. LUNGS AND PLEURAL SPACES: Small layering left effusion. No dense consolidation or pneumothorax. Small layering left effusion. Total time spent 61 minutes )Eleuterio Guzman DO,

## 2021-03-12 NOTE — PROGRESS NOTES
Reason for Admission:  fever and hypoxia RUR Score:    Low; 18% Plan for utilizing home health:       
 
PCP: First and Last name:  OtherChristiano MD 
 
 Name of Practice:  
 Are you a current patient: Yes/No:  
 Approximate date of last visit:  
 Can you participate in a virtual visit with your PCP:  
                 
Current Advanced Directive/Advance Care Plan: Full Code Healthcare Decision Maker:  
Click here to complete [address] including selection of the Healthcare Decision Maker Relationship (ie \"Primary\") Primary Decision Maker: Roma Castrejon  - [phone] Transition of Care Plan:  Return to [facility] CC Chart reviewed. Per H&P \"Papito Valverde is a 64 y.o. male with type 2 diabetes mellitus with end-stage renal disease on hemodialysis, severe peripheral arterial disease with 3 extremity amputations, CAD, thrombocytopenia, CHF, and CAD who was transferred from [facility] home for fever and hypoxia. He has been seen by 3 separate emergency departments with last facility being [facility] on 3/8 for possible sepsis. He has had sepsis before and thinks he may have it again. He says he has had intermittent fevers for a week and he has not been able to complete a full dialysis session this past week either. He receives dialysis at the [place]. He denies any chest pain but has had some shortness of breath and cough. He has had no nausea, vomiting, or diarrhea. He was tested at [place] for Covid 3 days ago, which was negative. \" Noted pt goes to the [facility] for dialysis. JAIME contacted Pascual Killian ([phone] ext [phone]; [phone] fax) and he has confirmed this. Clinical information has been sent to this agency to assist with transition of care. JAIME met with pt at bedside to discuss transition of care.   Pt has indicated he is from [address] [place] [address] St and he plans to return to this faciltiy. CMS has been notified to assist.  Anticipate pt will return to this facility when medically stable. Anticipate pt will remain inpt through the weekend. CM to continue to follow and assist. 
 
Care Management Interventions Mode of Transport at Discharge: S Transition of Care Consult (CM Consult): Discharge Planning, [address] [place] [address] Reviewed: Yes Current Support Network: Nursing Facility(LTC resident at [address]) The Plan for Transition of Care is Related to the Following Treatment Goals : [facility] The Patient and/or Patient Representative was Provided with a Choice of Provider and Agrees with the Discharge Plan?: Yes Name of the Patient Representative Who was Provided with a Choice of Provider and Agrees with the Discharge Plan: pt 
Freedom of Choice List was Provided with Basic Dialogue that Supports the Patient's Individualized Plan of Care/Goals, Treatment Preferences and Shares the Quality Data Associated with the Providers?: Yes Discharge Location Discharge Placement: Skilled nursing facility(vs LTC)

## 2021-03-12 NOTE — ED NOTES
TRANSFER - OUT REPORT: 
 
Verbal report given to Cal (name) on Marcheta Fernanda  being transferred to Medical(unit) for routine progression of care Report consisted of patients Situation, Background, Assessment and  
Recommendations(SBAR). Information from the following report(s) SBAR, Kardex, ED Summary and MAR was reviewed with the receiving nurse. Lines:  
Peripheral IV 03/11/21 Right Other(comment) (Active) Site Assessment Clean, dry, & intact 03/11/21 2156 Phlebitis Assessment 0 03/11/21 2156 Infiltration Assessment 0 03/11/21 2156 Dressing Status Clean, dry, & intact 03/11/21 2156 Dressing Type 4 X 4 03/11/21 2156 Hub Color/Line Status Pink 03/11/21 2156 Action Taken Blood drawn 03/11/21 2156 Peripheral IV 03/11/21 Left Antecubital (Active) Site Assessment Clean, dry, & intact 03/11/21 2156 Phlebitis Assessment 0 03/11/21 2156 Infiltration Assessment 0 03/11/21 2156 Dressing Status Clean, dry, & intact 03/11/21 2156 Dressing Type 4 X 4 03/11/21 2156 Hub Color/Line Status Green 03/11/21 2156 Action Taken Blood drawn 03/11/21 2156 Opportunity for questions and clarification was provided. Patient transported with: 
 Registered Nurse 2 Liters O2

## 2021-03-12 NOTE — ROUTINE PROCESS
TRANSFER - IN REPORT: 
 
Verbal report received from [place], RN(name) on [address] [facility]  being received from ED(unit) for routine progression of care Report consisted of patients Situation, Background, Assessment and  
Recommendations(SBAR). Information from the following report(s) SBAR, Kardex, Procedure Summary, Intake/Output, MAR and Recent Results was reviewed with the receiving nurse. Opportunity for questions and clarification was provided. Assessment completed upon patients arrival to unit and care assumed. Uneventful shift, pt's only complaint was the discomfort from the wound on his bottom. No acute changes, NAD. Bedside and verbal report given by (off going nurse) LEAH Ortiz RN to (oncoming nurse) [facility], [address] [place] [address]. Report included the following information SBAR, Kardex, OR Summary, Intake/Output, and MAR.

## 2021-03-12 NOTE — ED TRIAGE NOTES
Patient arrives via [facility] from [employer] with c/o hypotension. Patient has a RIGHT BKA and LEFT AKA with chronic hypotension. Patient reports having a sore bottom x2 weeks. Was seen at 2 other facilites with septic workup and abnormal labs and now wants a third opinion. Patient on dialysis. Denies chest pain, SOB. Reports cough x3 weeks. Reports fever on Monday

## 2021-03-12 NOTE — WOUND CARE
IP WOUND CONSULT Gretchen Rmaírez MEDICAL RECORD NUMBER:  [id number] AGE: 64 y.o. GENDER: male  : 1960 TODAY'S DATE:  3/12/2021 GENERAL  
 
[] Follow-up [x] New Consult Gretchen Ramírez is a 64 y.o. male referred by:  
[x] Physician 
[x] Nursing 
[] Other: PAST MEDICAL HISTORY Past Medical History:  
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)  Hypokalemia  Hypotension of hemodialysis  Idiopathic neuropathy  Iron deficiency anemia  MDD (major depressive disorder)  Myocardial infarct ([address]  Obstructive sleep apnea  Pancytopenia ([address]  Paroxysmal atrial fibrillation (HCC)  Peripheral vascular disease ([address]  Presence of aortocoronary bypass graft  Thrombocytopenia ([address]  Type 2 diabetes mellitus ([address] PAST SURGICAL HISTORY Past Surgical History:  
Procedure Laterality Date  HX ORTHOPAEDIC    
 triple amputee  [place] CARDIAC SURG PROCEDURE UNLIST  VASCULAR SURGERY PROCEDURE UNLIST    
 
 
FAMILY HISTORY Family History Problem Relation Age of Onset  Dementia Mother  Hypertension Mother  Diabetes Father  Hypertension Father  Diabetes Sister SOCIAL HISTORY Social History Tobacco Use  Smoking status: Never Smoker Substance Use Topics  Alcohol use: Not Currently  Drug use: Not Currently ALLERGIES Allergies Allergen Reactions  Benadryl Extra Strength [Diphenhydramine-Zinc Acetate] Unable to Obtain  Gentamicin Unable to Obtain MEDICATIONS No current facility-administered medications on file prior to encounter. No current outpatient medications on file prior to encounter. Wt Readings from Last 3 Encounters:  
03/12/21 60.8 kg (134 lb) [unfilled] Visit Vitals BP (!) 102/56 (BP 1 Location: Right upper arm, BP Patient Position: At rest) Pulse 88 Temp 97.6 °F (36.4 °C) Resp 16 Ht 5' (1.524 m) Wt 60.8 kg (134 lb) SpO2 100% BMI 26.17 kg/m² ASSESSMENT Ulcer Identification: 
Ulcer Type: pressure Contributing Factors: chronic pressure, decreased mobility, incontinence of stool and incontinence of urine Wound Buttocks Right (Active) Wound Image   03/12/21 1412 Wound Etiology Pressure Stage 2 03/12/21 1412 Dressing Status Clean;Dry; Intact 03/12/21 1412 Dressing/Treatment Other (Comment) 03/12/21 1412 Wound Length (cm) 1 cm 03/12/21 1412 Wound Width (cm) 0.7 cm 03/12/21 1412 Wound Depth (cm) 0.1 cm 03/12/21 1412 Wound Surface Area (cm^2) 0.7 cm^2 03/12/21 1412 Wound Volume (cm^3) 0.07 cm^3 03/12/21 1412 Wound Assessment Pale granulation tissue 03/12/21 1412 Drainage Amount Scant 03/12/21 1412 Drainage Description Sanguineous 03/12/21 1412 Wound Odor None 03/12/21 1412 Simin-Wound/Incision Assessment Fragile; Intact 03/12/21 1412 Edges Attached edges; Defined edges 03/12/21 1412 Wound Thickness Description Partial thickness 03/12/21 1412 Number of days: 0 PLAN Skin Care & Pressure Relief Recommendations Minimize layers of linen Pads under patient to optimize support surface Turn/reposition approximately every 2 hours Pillow wedges Manage incontinence Please use in bed position system Promote continence; Skin Protective lotion/cream to buttocks and sacrum daily and as needed with incontinence care Physician/Provider notified: No:  
Recommendations: orders already placed by physician Teaching completed with:  
[x] Patient          
[] Family member      
[] Caregiver [x] Nursing 
[] Other Patient/Caregiver Teaching: 
Level of patient/caregiver understanding able to:  
[] Indicates understanding       [x] Needs reinforcement 
[] Unsuccessful      [] Verbal Understanding 
[] Demonstrated understanding       [] No evidence of learning 
[] Refused teaching         [] N/A Electronically signed by Tamara Olmedo RN on 3/12/2021 at 2:14 PM

## 2021-03-12 NOTE — ED PROVIDER NOTES
EMERGENCY DEPARTMENT HISTORY AND PHYSICAL EXAM 
 
Date: 3/11/2021 Patient Name: Jerad Moreno History of Presenting Illness Chief Complaint Patient presents with  Hypotension History Provided By: Patient and EMS 
 
9:44 PM 
Jerad Moreno is a 64 y.o. male with PMHX of ESRD on hemodialysis, bilateral lower extremity amputations and right upper extremity amputation who presents to the emergency department C/O \"pain in my backside\". Patient reports the pain has been there for a few weeks. He also notes that he has had a fever and cough. He reports has been seen in 2 other emergency departments for these concerns without a diagnosis. He denies any chest pain, shortness of breath, abdominal pain, nausea or vomiting, bowel or urinary changes. He reports his last dialysis was on Wednesday, yesterday. PCP: Christiano Mascorro MD 
 
Current Facility-Administered Medications Medication Dose Route Frequency Provider Last Rate Last Admin  sodium chloride (NS) flush 5-10 mL  5-10 mL IntraVENous PRN Ahsan Grady MD      
 cefTRIAXone (ROCEPHIN) 2 g in sterile water (preservative free) 20 mL IV syringe  2 g IntraVENous Q24H Melany Grady MD   2 g at 03/11/21 2213  
 azithromycin (ZITHROMAX) 500 mg in 0.9% sodium chloride 250 mL (VIAL-MATE)  500 mg IntraVENous Q24H Ahsan Grady MD   500 mg at 03/11/21 2236  sodium chloride (NS) flush 5-40 mL  5-40 mL IntraVENous Q8H Pradip Jade MD      
 sodium chloride (NS) flush 5-40 mL  5-40 mL IntraVENous PRN Pradip Jade MD      
 acetaminophen (TYLENOL) tablet 650 mg  650 mg Oral Q6H PRN Pradip Jade MD      
 Or  
 acetaminophen (TYLENOL) suppository 650 mg  650 mg Rectal Q6H PRN Pradip Jade MD      
 polyethylene glycol (MIRALAX) packet 17 g  17 g Oral DAILY PRN Pradip Jdae MD      
 promethazine (PHENERGAN) tablet 12.5 mg  12.5 mg Oral Q6H PRN Pradip Jade MD      
 Or  
[facility] (ZOFRAN) injection 4 mg  4 mg IntraVENous Q6H PRN Sonya Steel MD      
 [START ON 3/12/2021] famotidine (PEPCID) tablet 20 mg  20 mg Oral BID Sonya Steel MD      
 [START ON 3/12/2021] insulin lispro (HUMALOG) injection   SubCUTAneous AC&HS Sonya Steel MD      
 glucose chewable tablet 16 g  16 g Oral PRN Sonya Steel MD      
 glucagon (GLUCAGEN) injection 1 mg  1 mg IntraMUSCular PRN Sonya Steel MD      
 dextrose 10% infusion 125-250 mL  125-250 mL IntraVENous PRN Sonya Steel MD      
 
 
Past History Past Medical History: 
Past Medical History:  
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 
History reviewed. No pertinent surgical history. Family History: 
Family History Problem Relation Age of Onset  Dementia Mother  Hypertension Mother  Diabetes Father  Hypertension Father  Diabetes Sister Social History: 
Social History Tobacco Use  Smoking status: Never Smoker Substance Use Topics  Alcohol use: Not Currently  Drug use: Not Currently Allergies: Allergies Allergen Reactions  Benadryl Extra Strength [Diphenhydramine-Zinc Acetate] Unable to Obtain  Gentamicin Unable to Obtain Review of Systems Review of Systems Constitutional: Positive for fever. Respiratory: Positive for cough. Negative for shortness of breath. Cardiovascular: Negative for chest pain. Gastrointestinal: Negative for abdominal pain. Musculoskeletal: Positive for back pain. All other systems reviewed and are negative. Physical Exam  
 
Vitals:  
 03/11/21 2240 03/11/21 2300 03/11/21 2323 03/11/21 2340 BP: (!) 106/42 (!) 101/51 (!) 100/40 (!) 102/40 Pulse:  90 93 91 Resp:  15 19 17 Temp:      
SpO2:  100% 100% 100% Weight:      
Height:      
 
Physical Exam 
 
Nursing notes and vital signs reviewed Constitutional: Chronically ill appearing, moderate distress Head: Normocephalic, Atraumatic Eyes: EOMI Neck: Supple Cardiovascular: Regular rate and rhythm, no murmurs, rubs, or gallops Chest: Normal work of breathing and chest excursion bilaterally Lungs: Clear to ausculation bilaterally Abdomen: Soft, non tender, non distended, normoactive bowel sounds, multiple scars Back: Sacral wounds noted without evidence of infection Extremities: Left BKA, right AKA, right upper extremity amputation at the mid forearm Skin: Warm and dry, normal cap refill Neuro: Alert and appropriate, face symmetric, normal speech Psychiatric: Normal mood and affect Diagnostic Study Results Labs - Recent Results (from the past 12 hour(s)) EKG, 12 LEAD, INITIAL Collection Time: 03/11/21  9:47 PM  
Result Value Ref Range Ventricular Rate 95 BPM  
 Atrial Rate 95 BPM  
 P-R Interval 152 ms QRS Duration 88 ms Q-T Interval 316 ms  
 QTC Calculation (Bezet) 397 ms Calculated P Axis 69 degrees Calculated R Axis 31 degrees Calculated T Axis 24 degrees  Diagnosis Normal sinus rhythm Low voltage QRS Nonspecific T wave abnormality Abnormal ECG No previous ECGs available CBC WITH AUTOMATED DIFF Collection Time: 03/11/21  9:54 PM  
Result Value Ref Range WBC 3.2 (L) 4.6 - 13.2 K/uL  
 RBC 4.77 4.70 - 5.50 M/uL  
 HGB 10.7 (L) 13.0 - 16.0 g/dL HCT 33.2 (L) 36.0 - 48.0 % MCV 69.6 (L) 74.0 - 97.0 FL  
 MCH 22.4 (L) 24.0 - 34.0 PG  
 MCHC 32.2 31.0 - 37.0 g/dL  
 RDW 19.8 (H) 11.6 - 14.5 % PLATELET 49 (L) 554 - 420 K/uL NEUTROPHILS 62 42 - 75 % BAND NEUTROPHILS 3 0 - 5 % LYMPHOCYTES 19 (L) 20 - 51 % MONOCYTES 13 (H) 2 - 9 % EOSINOPHILS 3 0 - 5 % BASOPHILS 0 0 - 3 %  
 ABS. NEUTROPHILS 2.0 1.8 - 8.0 K/UL  
 ABS. LYMPHOCYTES 0.6 (L) 0.8 - 3.5 K/UL  
 ABS. MONOCYTES 0.4 0 - 1.0 K/UL  
 ABS. EOSINOPHILS 0.1 0.0 - 0.4 K/UL  
 ABS. BASOPHILS 0.0 0.0 - 0.1 K/UL PLATELET COMMENTS LARGE PLATELETS    
 RBC COMMENTS ANISOCYTOSIS 2+ 
    
 RBC COMMENTS MICROCYTOSIS 2+ 
    
 RBC COMMENTS HYPOCHROMIA 2+ 
    
 RBC COMMENTS POLYCHROMASIA 1+ 
    
 RBC COMMENTS OVALOCYTES 2+ 
    
 RBC COMMENTS     
  TARGET CELLS 
OCCASIONAL 
SCHISTOCYTES 
OCCASIONAL 
  
 DF MANUAL METABOLIC PANEL, COMPREHENSIVE Collection Time: 03/11/21  9:54 PM  
Result Value Ref Range Sodium 140 136 - 145 mmol/L Potassium 3.7 3.5 - 5.5 mmol/L Chloride 107 100 - 111 mmol/L  
 CO2 25 21 - 32 mmol/L Anion gap 8 3.0 - 18 mmol/L Glucose 102 (H) 74 - 99 mg/dL BUN 30 (H) 7.0 - 18 MG/DL Creatinine 4.79 (H) 0.6 - 1.3 MG/DL  
 BUN/Creatinine ratio 6 (L) 12 - 20 GFR est AA 15 (L) >60 ml/min/1.73m2 GFR est non-AA 13 (L) >60 ml/min/1.73m2 Calcium 7.4 (L) 8.5 - 10.1 MG/DL Bilirubin, total 1.0 0.2 - 1.0 MG/DL  
 ALT (SGPT) 54 16 - 61 U/L  
 AST (SGOT) 106 (H) 10 - 38 U/L Alk. phosphatase 538 (H) 45 - 117 U/L Protein, total 5.9 (L) 6.4 - 8.2 g/dL Albumin 1.8 (L) 3.4 - 5.0 g/dL Globulin 4.1 (H) 2.0 - 4.0 g/dL  A-G Ratio 0.4 (L) 0.8 - 1. 7 MAGNESIUM Collection Time: 03/11/21  9:54 PM  
Result Value Ref Range Magnesium 1.7 1.6 - 2.6 mg/dL PHOSPHORUS Collection Time: 03/11/21  9:54 PM  
Result Value Ref Range Phosphorus 3.7 2.5 - 4.9 MG/DL PROTHROMBIN TIME + INR Collection Time: 03/11/21  9:54 PM  
Result Value Ref Range Prothrombin time 21.8 (H) 11.5 - 15.2 sec INR 2.0 (H) 0.8 - 1.2 PTT Collection Time: 03/11/21  9:54 PM  
Result Value Ref Range aPTT 55.8 (H) 23.0 - 36.4 SEC NT-PRO BNP Collection Time: 03/11/21  9:54 PM  
Result Value Ref Range NT pro-BNP 34,744 (H) 0 - 900 PG/ML  
CARDIAC PANEL,(CK, CKMB & TROPONIN) Collection Time: 03/11/21  9:54 PM  
Result Value Ref Range CK - MB 1.6 <3.6 ng/ml CK-MB Index 2.4 0.0 - 4.0 % CK 66 39 - 308 U/L Troponin-I, QT 0.02 0.0 - 0.045 NG/ML  
POC LACTIC ACID Collection Time: 03/11/21 10:05 PM  
Result Value Ref Range Lactic Acid (POC) 1.42 0.40 - 2.00 mmol/L  
POC G3 Collection Time: 03/11/21 10:06 PM  
Result Value Ref Range Device: ROOM AIR    
 FIO2 (POC) 0.21 % pH (POC) 7.44 7.35 - 7.45    
 pCO2 (POC) 34.6 (L) 35.0 - 45.0 MMHG  
 pO2 (POC) 57 (L) 80 - 100 MMHG  
 HCO3 (POC) 23.3 22 - 26 MMOL/L  
 sO2 (POC) 90 (L) 92 - 97 % Base deficit (POC) 1 mmol/L Allens test (POC) N/A Total resp. rate 15 Site LEFT BRACHIAL Patient temp. 98.3 Specimen type (POC) ARTERIAL Performed by Cleve Paul Radiologic Studies -  
XR CHEST PORT Final Result Small layering left effusion. CT Results  (Last 48 hours) None CXR Results  (Last 48 hours) 03/11/21 2215  XR CHEST PORT Final result Impression:     
Small layering left effusion. Narrative:  EXAM: XR CHEST PORT  
   
CLINICAL INDICATION/HISTORY: meets SIRS criteria  
-Additional: None COMPARISON: None TECHNIQUE: Portable frontal view of the chest  
   
_______________ FINDINGS:  
   
SUPPORT DEVICES: Left chest wall tunneled dialysis catheter tip at the  
cavoatrial junction. HEART AND MEDIASTINUM: Prior median sternotomy and CABG. Cardiomediastinal  
silhouette within normal limits. LUNGS AND PLEURAL SPACES: Small layering left effusion. No dense consolidation  
or pneumothorax.  
   
_______________ Medications given in the ED- Medications  
sodium chloride (NS) flush 5-10 mL (has no administration in time range)  
cefTRIAXone (ROCEPHIN) 2 g in sterile water (preservative free) 20 mL IV syringe (2 g IntraVENous Given 3/11/21 2213) azithromycin (ZITHROMAX) 500 mg in 0.9% sodium chloride 250 mL (VIAL-MATE) (500 mg IntraVENous Given 3/11/21 2236)  
sodium chloride (NS) flush 5-40 mL (has no administration in time range)  
sodium chloride (NS) flush 5-40 mL (has no administration in time range)  
acetaminophen (TYLENOL) tablet 650 mg (has no administration in time range) Or  
acetaminophen (TYLENOL) suppository 650 mg (has no administration in time range)  
polyethylene glycol (MIRALAX) packet 17 g (has no administration in time range)  
promethazine (PHENERGAN) tablet 12.5 mg (has no administration in time range) Or  
ondansetron (ZOFRAN) injection 4 mg (has no administration in time range)  
famotidine (PEPCID) tablet 20 mg (has no administration in time range)  
insulin lispro (HUMALOG) injection (has no administration in time range) glucose chewable tablet 16 g (has no administration in time range) glucagon (GLUCAGEN) injection 1 mg (has no administration in time range) dextrose 10% infusion 125-250 mL (has no administration in time range) Medical Decision Making I am the first provider for this patient. I reviewed the vital signs, available nursing notes, past medical history, past surgical history, family history and social history. Vital Signs-Reviewed the patient's vital signs. Pulse Oximetry Analysis - 97% on room air, not hypoxic Cardiac Monitor: 
Rate: 93 bpm 
Rhythm: Normal sinus EKG interpretation: (Preliminary) EKG read by Dr. Angela Story at 9:49 PM 
Normal sinus rhythm at a rate of 95 bpm, MN interval 152 ms, QRS duration 88 ms, low voltage throughout, no prior available for comparison Records Reviewed: Nursing Notes and Old Medical Records, seen at [facility] emergency department 3 days ago on March 8 and had elevated troponin and reportedly hypotensive and hypoxic at that time. Also had a negative Covid test during this visit and has received both of his COVID-19 vaccines. Patient reports at baseline he has low blood pressures and his systolics are typically in the 90s. Provider Notes (Medical Decision Making): Ulices Gray is a 64 y.o. male presenting for cough and fever from his nursing facility. Patient has multiple comorbidities including chronic hypotension. Patient is not septic with a blood pressure that is normal for him and a negative lactate. Patient is hypoxic and requiring supplemental oxygen per his ABG. Covered for possible pneumonia given his symptoms and his hypoxia. Discussed with hospitalist for further in-hospital management. Procedures: 
Procedures ED Course:  
CONSULT NOTE:  
11:22 PM 
Dr. Angela Story spoke with Dr. Jd Gallego Specialty: Hospitalist 
Discussed pt's hx, disposition, and available diagnostic and imaging results over the telephone. Reviewed care plans. Accepts to medical.  
 
 
Diagnosis and Disposition Critical Care Time: 11:50 PM 
I have spent 35 minutes of critical care time involved in lab review, consultations with specialist, family decision-making, and documentation. During this entire length of time I was immediately available to the patient. Critical Care:   The reason for providing this level of medical care for this critically ill patient was due a critical illness that impaired one or more vital organ systems such that there was a high probability of imminent or life threatening deterioration in the patients condition. This care involved high complexity decision making to assess, manipulate, and support vital system functions, to treat this degreee vital organ system failure and to prevent further life threatening deterioration of the patients condition. Core Measures: 
For Hospitalized Patients: 
 
1. Hospitalization Decision Time: The decision to hospitalize the patient was made by Dr. Tiny Saleh at 10:35 PM on 3/11/2021 2. Aspirin: Aspirin was not given because the patient did not present with a stroke at the time of their Emergency Department evaluation 11:22 PM  Patient is being admitted to the hospital by Dr. Marin Lennon. The results of their tests and reasons for their admission have been discussed with them and/or available family. They convey agreement and understanding for the need to be admitted and for their admission diagnosis. CONDITIONS ON ADMISSION: 
Sepsis is not present at the time of admission. Deep Vein Thrombosis is not present at the time of admission. Thrombosis is not present at the time of admission. Urinary Tract Infection is not present at the time of admission. Pneumonia maybe present at the time of admission. MRSA maybe present at the time of admission. Wound infection maybe present at the time of admission. Pressure Ulcer is present at the time of admission. CLINICAL IMPRESSION: 
 
1. Hypoxia 2. Wound of sacral region, initial encounter   
 
_______________________________ Please note that this dictation was completed with "[employer] the computer voice recognition software. Quite often unanticipated grammatical, syntax, homophones, and other interpretive errors are inadvertently transcribed by the computer software. Please disregard these errors. Please excuse any errors that have escaped final proofreading.

## 2021-03-13 NOTE — PROGRESS NOTES
Problem: Pressure Injury - Risk of 
Goal: *Prevention of pressure injury Description: Document Lake Scale and appropriate interventions in the flowsheet. Outcome: Progressing Towards Goal 
Note: Pressure Injury Interventions: 
  
 
Moisture Interventions: Absorbent underpads, Check for incontinence Q2 hours and as needed, Limit adult briefs Activity Interventions: Pressure redistribution bed/mattress(bed type) Mobility Interventions: Pressure redistribution bed/mattress (bed type), Turn and reposition approx. every two hours(pillow and wedges) Nutrition Interventions: Document food/fluid/supplement intake Friction and Shear Interventions: Foam dressings/transparent film/skin sealants, HOB 30 degrees or less Problem: Patient Education: Go to Patient Education Activity Goal: Patient/Family Education Outcome: Progressing Towards Goal 
  
Problem: Falls - Risk of 
Goal: *Absence of Falls Description: Document Clydene Bone Fall Risk and appropriate interventions in the flowsheet. Outcome: Progressing Towards Goal 
Note: Fall Risk Interventions: 
  
 
  
 
Medication Interventions: Patient to call before getting OOB Elimination Interventions: Call light in reach, Patient to call for help with toileting needs Problem: Patient Education: Go to Patient Education Activity Goal: Patient/Family Education Outcome: Progressing Towards Goal 
  
Problem: Diabetes Self-Management Goal: *Disease process and treatment process Description: Define diabetes and identify own type of diabetes; list 3 options for treating diabetes. Outcome: Progressing Towards Goal 
Goal: *Incorporating nutritional management into lifestyle Description: Describe effect of type, amount and timing of food on blood glucose; list 3 methods for planning meals. Outcome: Progressing Towards Goal 
Goal: *Incorporating physical activity into lifestyle Description: State effect of exercise on blood glucose levels. Outcome: Progressing Towards Goal 
Goal: *Developing strategies to promote health/change behavior Description: Define the ABC's of diabetes; identify appropriate screenings, schedule and personal plan for screenings. Outcome: Progressing Towards Goal 
Goal: *Using medications safely Description: State effect of diabetes medications on diabetes; name diabetes medication taking, action and side effects. Outcome: Progressing Towards Goal 
Goal: *Monitoring blood glucose, interpreting and using results Description: Identify recommended blood glucose targets  and personal targets. Outcome: Progressing Towards Goal 
Goal: *Prevention, detection, treatment of acute complications Description: List symptoms of hyper- and hypoglycemia; describe how to treat low blood sugar and actions for lowering  high blood glucose level. Outcome: Progressing Towards Goal 
Goal: *Prevention, detection and treatment of chronic complications Description: Define the natural course of diabetes and describe the relationship of blood glucose levels to long term complications of diabetes. Outcome: Progressing Towards Goal 
Goal: *Developing strategies to address psychosocial issues Description: Describe feelings about living with diabetes; identify support needed and support network Outcome: Progressing Towards Goal 
Goal: *Insulin pump training Outcome: Progressing Towards Goal 
Goal: *Sick day guidelines Outcome: Progressing Towards Goal 
Goal: *Patient Specific Goal (EDIT GOAL, INSERT TEXT) Outcome: Progressing Towards Goal 
  
Problem: Patient Education: Go to Patient Education Activity Goal: Patient/Family Education Outcome: Progressing Towards Goal

## 2021-03-13 NOTE — ROUTINE PROCESS
Verbal shift change report given to Amanda Cha (oncoming nurse) by Gregory Davis nurse). Report included the following information SBAR, Kardex and MAR.

## 2021-03-13 NOTE — ROUTINE PROCESS
Bedside shift change report given to Abran Duverney RN (oncoming nurse) by Orlando Berumen RN (offgoing nurse). Report included the following information SBAR, Kardex, Intake/Output and MAR.

## 2021-03-13 NOTE — PROGRESS NOTES
2337-Resting in bed, eyes closed, arouses easily. Stable. Call light within reach. 2353-Assessment complete. See shift assessment documentation. Stable. 0525-Stable. Resting in bed. Stable. No change from initial assessment. Changed for incontinence of bowel. Linen changed. 0650-Resting in bed, stable. Shift Summary:  Stable at end of shift.

## 2021-03-13 NOTE — PROGRESS NOTES
[id number] In pt chart for peer review. Received a critical result for primary RN. Made her aware and she asked me to make the provider aware. Provider updated and no orders received.

## 2021-03-13 NOTE — PROGRESS NOTES
Hospitalist Progress Note Patient: Edy Whittaker MRN: [id number]  CSN: [id number] YOB: 1960  Age: 64 y.o. Sex: male DOA: 3/11/2021 LOS:  LOS: 2 days Assessment/Plan Principal Problem: Hypoxia (3/11/2021) Active Problems: 
  Pleural effusion on left (3/11/2021) DM (diabetes mellitus), type 2 with renal complications ([address] (7/95/8547) ESRD (end stage renal disease) on dialysis ([address] (3/11/2021) CAD (coronary artery disease) (3/12/2021) Severe peripheral arterial disease ([address] (3/12/2021) Amputee, lower limb ([address] (3/12/2021) Amputee, upper limb, right (3/12/2021) CHF (congestive heart failure) ([address] (3/12/2021) Thrombocytopenia ([address] (3/12/2021) Decubitus ulcer of left buttock, stage 2 ([address] (3/12/2021) Fever (3/12/2021) Pneumonia due to 2019 novel coronavirus (3/12/2021) CC: 61-year-old male with type 2 diabetes mellitus with end-stage renal disease on hemodialysis, severe peripheral arterial disease with 3 extremity amputations, CAD, thrombocytopenia, CHF, and CAD is admitted with hypoxia, fever, and left sided pleural effusion, + COVID. 
  
Rapid covid 19 positive: on cohort unit, not a candidate for remdisivir due to renal function, . Iv steroid-due to high risk of bleeding with po form . Request plasma , poor prognosis. Hypoxia: like fluid overloaded and COVID. Need hd Tested in sentera-negative Fever: Blood cx still pending /neg so far So far no fever after admission Need to r/o bacteremia due to high risk  
  
Pleural effusion on left with layering CT chest: 1.  Dependent regions of consolidation bilaterally, findings favored to reflect 
atelectasis particularly at the right lung base with superimposed pneumonia 
involving the left lower lobe difficult to exclude given the imaging appearance. 
 2. Mild interstitial edema and small bilateral pleural effusions. 
 3. Extensive atherosclerotic vascular disease both above and below the 
diaphragm. 
 4. Small potentially calcified pulmonary nodules right middle lobe likely 
reflecting sequela of prior granulomatous infection 
  
ESRD on hemodialysis with left chest wall TDC in place 
dialysis Monday/Wednesday/Friday at the [place].   
Need hd today, dr. Toño Khan is following 
  
CHF: EF was 55% per Echo.  
  
Thrombocytopenia with elevated inr and low albumin -like cirrhosis picture   
Hold aspirin now So far no bleeding noted Abdomen ultrasound reviewed 
  
Decubitus ulcer of the left buttock, stage II: Wound care consult 
  
Type 2 diabetes mellitus: Sliding scale insulin 
  
CAD: Continue home medications 
  
Severe peripheral arterial disease with triple amputation Fall precaution Palliative care consult put in  
  
45 total min's spent on patient care including >50% on counseling/coordinating care. Discussed the above assessments. also discussed labs, medications and hospital course 
  
  
Disposition :tbd,  
        
Subjective: Pt was seen and examined with the nurse in the morning round. No acute event . Review of systems General: No fevers or chills. Cardiovascular: No chest pain or pressure. No palpitations. Pulmonary: No cough, SOB Gastrointestinal: No nausea, vomiting. Objective:  
  
Visit Vitals /65 Pulse 81 Temp 97.2 °F (36.2 °C) Resp 16 Ht 5' (1.524 m) Wt 60.8 kg (134 lb) SpO2 100% BMI 26.17 kg/m² Physical Exam: 
 
Gen: NAD, non-toxic. Heent:  MMM, NC, AT. Cor: s1s2 RRR. No MRG. PMI mid 5th intercostal space. Resp:  CTA b/l. No w/r/r. Nml effort and diaphragmatic excursion. Abd:  NT ND.  BS positive. No rebound or guarding. No masses. Ext: + R. BKA, L AKA Intake and Output: 
Current Shift:  No intake/output data recorded. Last three shifts:  03/11 1901 - 03/13 0700 In: 240 [P.O.:240] Out: 1000 Labs: Results:  
   
Chemistry Recent Labs  
  03/13/21 3139 03/12/21 
0549 03/11/21 
2154 * 87 102*  141 140  
K 3.6 3.5 3.7  107 107 CO2 26 26 25 BUN 17 34* 30* CREA 3.32* 5.38* 4.79* CA 7.4* 7.3* 7.4* AGAP 9 8 8 BUCR 5* 6* 6* * 500* 538* TP 6.2* 5.8* 5.9* ALB 2.2* 1.6* 1.8*  
GLOB 4.0 4.2* 4.1* AGRAT 0.6* 0.4* 0.4* CBC w/Diff Recent Labs  
  03/13/21 0255 03/12/21 
0549 03/11/21 2154 WBC 2.8* 3.3* 3.2*  
RBC 4.40* 4.55* 4.77 HGB 9.6* 9.9* 10.7* HCT 31.1* 32.1* 33.2*  
PLT 39* 41* 49* GRANS  --   --  62  
LYMPH  --   --  19* EOS  --   --  3 Cardiac Enzymes Recent Labs  
  03/12/21 
1830 03/12/21 
1229 CPK 76 82 CKND1 2.6 2.6 Coagulation Recent Labs  
  03/13/21 0255 03/11/21 
2154 PTP 22.4* 21.8* INR 2.0* 2.0* APTT  --  55.8* Lipid Panel No results found for: CHOL, CHOLPOCT, CHOLX, CHLST, CHOLV, 140707, HDL, HDLP, LDL, LDLC, DLDLP, 257156, VLDLC, VLDL, TGLX, TRIGL, TRIGP, TGLPOCT, CHHD, CHHDX  
BNP No results for input(s): BNPP in the last 72 hours. Liver Enzymes Recent Labs  
  03/13/21 0255 TP 6.2* ALB 2.2* * Thyroid Studies No results found for: T4, T3U, TSH, TSHEXT Procedures/imaging: see electronic medical records for all procedures/Xrays and details which were not copied into this note but were reviewed prior to creation of Plan Medications Reviewed Maribell Cobos MD

## 2021-03-13 NOTE — ROUTINE PROCESS
Bedside and Verbal shift change report given to Lindsey Membreno RN (oncoming nurse) by Kimberly Sheehan RN (offgoing nurse). Report included the following information SBAR and Kardex.

## 2021-03-13 NOTE — ROUTINE PROCESS
Verbal shift change report given to Yash Ruiz, RN  (oncoming nurse) by Phuc Mallory RN, RN (offgoing nurse). Report included the following information SBAR and Kardex.

## 2021-03-13 NOTE — PROGRESS NOTES
46 - Shift report received from Leyla Pandey, [facility]. Assumed care of patient. 2134 - Assessment completed as per flowsheet. Patient alert and oriented x4. On 3.5 L via nasal cannula at 100%. Decreased to 3L/min. Denies any SOB/chest pain. Lungs clear and diminished. Reports discomfort to sacrum r/t wound. Patient repositioned in bed. IV's intact to right shoulder and left AC and flush without difficulty. Dialysis catheter to left chest. Patient sitting up in bed eating breakfast at this time. Call light in reach. 1700 - Drsg changed and santyl applied as per order. 1836 - Attempted to return call to mother [employer]) for patient update. No answer received. Left voice message to return call. 46 - Return call received and mother updated.

## 2021-03-13 NOTE — PROGRESS NOTES
Assessment: Hypoxia (3/11/2021) 
  
  Pleural effusion on left (3/11/2021) 
  
  DM (diabetes mellitus), type 2 with renal complications ([facility] (4/70/3830) 
  
  ESRD (end stage renal disease) on dialysis [facility]) (3/11/2021) 
  
  CAD (coronary artery disease) (3/12/2021) 
  Severe peripheral arterial disease ([facility] (3/12/2021) 
  Amputee, lower limb ([facility] (3/12/2021) 
  Amputee, upper limb, right (3/12/2021) 
  
  CHF (congestive heart failure) ([facility] (3/12/2021) 
  Thrombocytopenia ([facility] (3/12/2021) 
  Decubitus ulcer of left buttock, stage 2 ([facility] (3/12/2021) 
  Fever (3/12/2021) 
  
 COVID19 positive 
  
Plan:   
HD MWF On retactir PO calcitriol Regular diet Check PTh 
 
CC: PT is eating BP was low, tolerated HD Interval History: no interval change since yesterday, pt is  Moved to [place] [id number]. Subjective:  
PT does not have any somatic complaints Review of Systems Negative for  SOB,  Nausea, vomiting Blood pressure (!) 89/47, pulse 88, temperature 97.4 °F (36.3 °C), resp. rate 16, height 5' (1.524 m), weight 60.8 kg (134 lb), SpO2 100 %. NAD, Conversant Intake/Output Summary (Last 24 hours) at 3/13/2021 8243 Last data filed at 3/12/2021 2115 Gross per 24 hour Intake 240 ml Output 1000 ml Net -760 ml Recent Labs  
  03/13/21 
0255 WBC 2.8* Lab Results Component Value Date/Time Sodium 140 03/13/2021 02:55 AM  
 Potassium 3.6 03/13/2021 02:55 AM  
 Chloride 105 03/13/2021 02:55 AM  
 CO2 26 03/13/2021 02:55 AM  
 Anion gap 9 03/13/2021 02:55 AM  
 Glucose 120 (H) 03/13/2021 02:55 AM  
 BUN 17 03/13/2021 02:55 AM  
 Creatinine 3.32 (H) 03/13/2021 02:55 AM  
 BUN/Creatinine ratio 5 (L) 03/13/2021 02:55 AM  
 GFR est AA 23 (L) 03/13/2021 02:55 AM  
 GFR est non-AA 19 (L) 03/13/2021 02:55 AM  
 Calcium 7.4 (L) 03/13/2021 02:55 AM  
  
 
Current Facility-Administered Medications Medication Dose Route Frequency Provider Last Rate Last Admin  [Held by provider] aspirin chewable tablet 81 mg  81 mg Oral DAILY Freddy Zuleta MD   81 mg at 03/12/21 2793  collagenase (SANTYL) 250 unit/gram ointment   Topical DAILY Freddy Zuleta MD   Given at 03/12/21 8267  docusate sodium (COLACE) capsule 100 mg  100 mg Oral DAILY Freddy Zuleta MD   100 mg at 03/12/21 1430  polyvinyl alcohol-povidon(PF) (REFRESH CLASSIC) 1.4-0.6 % ophthalmic solution 1 Drop  1 Drop Both Eyes PRN Freddy Zuleta MD      
 midodrine (PROAMATINE) tablet 10 mg  10 mg Oral TID WITH MEALS Freddy Zuleta MD   10 mg at 03/12/21 9455  traZODone (DESYREL) tablet 50 mg  50 mg Oral QHS Freddy Zuleta MD   50 mg at 03/12/21 2256  diclofenac (VOLTAREN) 1 % topical gel 2 g  2 g Topical DAILY PRN Freddy Zuleta MD      
 HYDROcodone-acetaminophen [facility]) 5-325 mg per tablet 1 Tab  1 Tab Oral Q8H PRN Freddy Zuleta MD   1 Tab at 03/12/21 0889  fluticasone propionate (FLONASE) 50 mcg/actuation nasal spray 2 Spray  2 Spray Both Nostrils DAILY Freddy Zuleta MD   2 Spray at 03/12/21 2131  vit B Cmplx 3-FA-Vit C-Biotin (NEPHRO MIRACLE RX) tablet 1 Tab  1 Tab Oral DAILY Freddy Zuleta MD   1 Tab at 03/12/21 5470  epoetin toribio-epbx (RETACRIT) injection 6,000 Units  6,000 Units SubCUTAneous Q MON, WED & FRI Eleuterio Benjamin, DO      
 Vancomycin - Pharmacy to Dose  1 Each Other Rx Dosing/Monitoring Delvis Nicolas MD      
 cefTRIAXone (ROCEPHIN) 1 g in sterile water (preservative free) 10 mL IV syringe  1 g IntraVENous Q24H Hoa Grady MD   1 g at 03/12/21 2256  famotidine (PEPCID) tablet 10 mg  10 mg Oral QPM Freddy Zuleta MD      
 acetaminophen (TYLENOL) tablet 650 mg  650 mg Oral Q6H PRN Delvis Nicolas MD      
 Or  
 acetaminophen (TYLENOL) suppository 650 mg  650 mg Rectal Q6H PRN Delvis Nicolas MD      
 guaiFENesin-dextromethorphan [facility] DM) 100-10 mg/5 mL syrup 5 mL  5 mL Oral Q4H PRN Eddie Peres, Roc Lawson MD      
 dexamethasone (DECADRON) 4 mg/mL injection 6 mg  6 mg IntraVENous Q24H Gisela Sauceda MD   6 mg at 03/12/21 2256  
 0.9% sodium chloride infusion 250 mL  250 mL IntraVENous PRN Gisela Sauceda MD      
 melatonin (rapid dissolve) tablet 5 mg  5 mg Oral QHS Gisela Sauceda MD   5 mg at 03/12/21 2255  vancomycin (VANCOCIN) 500 mg in 0.9% sodium chloride (MBP/ADV) 100 mL MBP  500 mg IntraVENous Penelope Luna MD      
 albumin human 25% (BUMINATE) solution 25 g  25 g IntraVENous DIALYSIS PRN Eleuterio Benjamin R, DO   25 g at 03/12/21 1932  sodium chloride (NS) flush 5-10 mL  5-10 mL IntraVENous PRN Roberto Grady MD      
 azithromycin (ZITHROMAX) 500 mg in 0.9% sodium chloride 250 mL (VIAL-MATE)  500 mg IntraVENous Q24H Roberto Grady MD   500 mg at 03/12/21 2257  sodium chloride (NS) flush 5-40 mL  5-40 mL IntraVENous Q8H Netta Healy MD   10 mL at 03/13/21 5527  sodium chloride (NS) flush 5-40 mL  5-40 mL IntraVENous PRN Netta Healy MD      
 polyethylene glycol (MIRALAX) packet 17 g  17 g Oral DAILY PRN Netta Healy MD      
 promethazine (PHENERGAN) tablet 12.5 mg  12.5 mg Oral Q6H PRN Netta Healy MD      
 Or  
 [facility]) injection 4 mg  4 mg IntraVENous Q6H PRN Netta Healy MD      
 insulin lispro (HUMALOG) injection   SubCUTAneous AC&HS Netta Healy MD   Stopped at 03/12/21 0730  
 glucose chewable tablet 16 g  16 g Oral PRN Netta Healy MD      
 glucagon (GLUCAGEN) injection 1 mg  1 mg IntraMUSCular PRN Netta Healy MD      
 dextrose 10% infusion 125-250 mL  125-250 mL IntraVENous PRN Netta Healy MD

## 2021-03-14 NOTE — PROGRESS NOTES
Comprehensive Nutrition Assessment Type and Reason for Visit: (P) Initial, Positive nutrition screen, Wound Nutrition Recommendations/Plan: Supplement: Add Nepro TID and Fredo BID Nutrition Assessment:  (P) pt admitted w/ hypoxia, fever, pleural effusion, COVID 19, ESRD on HD Estimated Daily Nutrient Needs: 
Energy (kcal): (P) 1491; Weight Used for Energy Requirements: (P) Ideal 
Protein (g): (P) 45; Weight Used for Protein Requirements: (P) Ideal(1.2) Fluid (ml/day): (P) 1491; Method Used for Fluid Requirements: (P) 1 ml/kcal 
 
 
Nutrition Related Findings:  (P) labs glucose-285 GFR est AA-14 BUN-38 Meds: colace, pepcid, humalog, melatonin, miralax, nephro [place] rx Wounds: (P) Pressure injury, Stage II    
 
Current Nutrition Therapies: DIET NUTRITIONAL SUPPLEMENTS Breakfast; Other DIET DIABETIC CONSISTENT CARB Regular Anthropometric Measures: 
Height:  (P) 5' (152.4 cm) Current Body Wt:  (P) 60.8 kg (134 lb) Admission Body Wt:      
Usual Body Wt:  (P) 69.4 kg (153 lb)(3/8/19) Ideal Body Wt:  (P) 106 lbs:  (P) 126.4 % Adjusted Body Weight:  (P) 164.2; Weight Adjustment for: (P) Amputation Adjusted BMI:  (P) 32.1 BMI Category:  (P) Overweight (BMI 25.0-29. 9) Nutrition Diagnosis: (P) Inadequate oral intake related to (P) impaired respiratory function as evidenced by (P) intake 0-25% Nutrition Interventions:  
Food and/or Nutrient Delivery: (P) Continue current diet, Modify oral nutrition supplement Nutrition Education and Counseling: (P) No recommendations at this time Coordination of Nutrition Care: (P) Continue to monitor while inpatient, Coordination of community care Goals: (P) Encourage PO intake >50% at most meals in the next 3-5days Nutrition Monitoring and Evaluation:  
Behavioral-Environmental Outcomes:   
Food/Nutrient Intake Outcomes: (P) Diet advancement/tolerance, Food and nutrient intake, Supplement intake Physical Signs/Symptoms Outcomes: (P) Biochemical data, Weight, Skin, Hemodynamic status, GI status Discharge Planning: (P) Continue oral nutrition supplement, Continue current diet Electronically signed by Michael Shabazz on 3/14/2021 at 12:43 PM

## 2021-03-14 NOTE — PROGRESS NOTES
Problem: Pressure Injury - Risk of 
Goal: *Prevention of pressure injury Description: Document Lake Scale and appropriate interventions in the flowsheet. Outcome: Progressing Towards Goal 
Note: Pressure Injury Interventions: 
  
 
Moisture Interventions: Absorbent underpads, Assess need for specialty bed, Check for incontinence Q2 hours and as needed, Limit adult briefs, Minimize layers Activity Interventions: PT/OT evaluation, Pressure redistribution bed/mattress(bed type), Increase time out of bed, Assess need for specialty bed Mobility Interventions: Assess need for specialty bed, HOB 30 degrees or less, Pressure redistribution bed/mattress (bed type), PT/OT evaluation Nutrition Interventions: Document food/fluid/supplement intake Friction and Shear Interventions: HOB 30 degrees or less, Foam dressings/transparent film/skin sealants, Lift sheet, Lift team/patient mobility team, Minimize layers Problem: Patient Education: Go to Patient Education Activity Goal: Patient/Family Education Outcome: Progressing Towards Goal 
  
Problem: Falls - Risk of 
Goal: *Absence of Falls Description: Document Leafy Saldaña Fall Risk and appropriate interventions in the flowsheet. Outcome: Progressing Towards Goal 
Note: Fall Risk Interventions: 
  
 
  
 
Medication Interventions: Teach patient to arise slowly, Patient to call before getting OOB, Bed/chair exit alarm, Evaluate medications/consider consulting pharmacy Elimination Interventions: Bed/chair exit alarm, Call light in reach, Patient to call for help with toileting needs, Toileting schedule/hourly rounds Problem: Patient Education: Go to Patient Education Activity Goal: Patient/Family Education Outcome: Progressing Towards Goal 
  
Problem: Diabetes Self-Management Goal: *Disease process and treatment process Description: Define diabetes and identify own type of diabetes; list 3 options for treating diabetes.  
Outcome: Progressing Towards Goal 
Goal: *Incorporating nutritional management into lifestyle Description: Describe effect of type, amount and timing of food on blood glucose; list 3 methods for planning meals. Outcome: Progressing Towards Goal 
Goal: *Incorporating physical activity into lifestyle Description: State effect of exercise on blood glucose levels. Outcome: Progressing Towards Goal 
Goal: *Developing strategies to promote health/change behavior Description: Define the ABC's of diabetes; identify appropriate screenings, schedule and personal plan for screenings. Outcome: Progressing Towards Goal 
Goal: *Using medications safely Description: State effect of diabetes medications on diabetes; name diabetes medication taking, action and side effects. Outcome: Progressing Towards Goal 
Goal: *Monitoring blood glucose, interpreting and using results Description: Identify recommended blood glucose targets  and personal targets. Outcome: Progressing Towards Goal 
Goal: *Prevention, detection, treatment of acute complications Description: List symptoms of hyper- and hypoglycemia; describe how to treat low blood sugar and actions for lowering  high blood glucose level. Outcome: Progressing Towards Goal 
Goal: *Prevention, detection and treatment of chronic complications Description: Define the natural course of diabetes and describe the relationship of blood glucose levels to long term complications of diabetes. Outcome: Progressing Towards Goal 
Goal: *Developing strategies to address psychosocial issues Description: Describe feelings about living with diabetes; identify support needed and support network Outcome: Progressing Towards Goal 
Goal: *Insulin pump training Outcome: Progressing Towards Goal 
Goal: *Sick day guidelines Outcome: Progressing Towards Goal 
Goal: *Patient Specific Goal (EDIT GOAL, INSERT TEXT) Outcome: Progressing Towards Goal

## 2021-03-14 NOTE — ROUTINE PROCESS
Verbal shift change report given to Gracie Hanley RN (oncoming nurse) by DO Carmen RN (offgoing nurse). Report included the following information SBAR, Kardex, ED Summary, Intake/Output, MAR and Recent Results.

## 2021-03-14 NOTE — PROGRESS NOTES
2035 Assessment completed. Pt denies any concern. 0016  Pt changed. Mepilex is intact. Reassessment completed. The bed is in the lowest position, bed alarm is on, call bell is within reach. 
 
2161 Reassessment completed. The bed is in the lowest position, bed alarm is on, call bell is within reach. Patient Vitals for the past 12 hrs: 
 Temp Pulse Resp BP SpO2  
03/14/21 0521 97.5 °F (36.4 °C) 95 16 (!) 108/56 100 % 03/13/21 2300 97.1 °F (36.2 °C) 84 16 (!) 105/59 100 % 03/13/21 1945 97.2 °F (36.2 °C) 81 16 (!) 113/58 100 % Bedside and Verbal shift change report given to FARHANA Javier RN (oncoming nurse) by Shakeel Kendall. Clif Colon RN (offgoing nurse).  Report included the following information SBAR, Kardex, Accordion, Recent Results and Cardiac Rhythm SR

## 2021-03-14 NOTE — PROGRESS NOTES
Hospitalist Progress Note Patient: Malorie Bartholomew MRN: [id number]  CSN: [id number] YOB: 1960  Age: 64 y.o. Sex: male DOA: 3/11/2021 LOS:  LOS: 3 days Assessment/Plan Principal Problem: Hypoxia (3/11/2021) Active Problems: 
  Pleural effusion on left (3/11/2021) DM (diabetes mellitus), type 2 with renal complications ([address] (9/33/4983) ESRD (end stage renal disease) on dialysis ([address] (3/11/2021) CAD (coronary artery disease) (3/12/2021) Severe peripheral arterial disease ([address] (3/12/2021) Amputee, lower limb ([address] (3/12/2021) Amputee, upper limb, right (3/12/2021) CHF (congestive heart failure) ([address] (3/12/2021) Thrombocytopenia ([address] (3/12/2021) Decubitus ulcer of left buttock, stage 2 ([address] (3/12/2021) Fever (3/12/2021) Pneumonia due to 2019 novel coronavirus (3/12/2021) CC: 57-year-old male with type 2 diabetes mellitus with end-stage renal disease on hemodialysis, severe peripheral arterial disease with 3 extremity amputations, CAD, thrombocytopenia, CHF, and CAD is admitted with hypoxia, fever, and left sided pleural effusion, + COVID. 
  
Rapid covid 19 positive: on cohort unit, not a candidate for remdisivir due to renal function, . Iv steroid-due to high risk of bleeding with po form . Request plasma , poor prognosis.  
  
Hypoxia: like fluid overloaded and COVID. Need hd Tested in sentera-negative  
  Fever: Blood cx still pending /neg so far So far no fever after admission Need to r/o bacteremia due to high risk  
  
Pleural effusion on left with layering CT chest: 1.  Dependent regions of consolidation bilaterally, findings favored to reflect 
atelectasis particularly at the right lung base with superimposed pneumonia 
involving the left lower lobe difficult to exclude given the imaging appearance. 
 2. Mild interstitial edema and small bilateral pleural effusions. 
 3. Extensive atherosclerotic vascular disease both above and below the 
diaphragm. 
 4. Small potentially calcified pulmonary nodules right middle lobe likely 
reflecting sequela of prior granulomatous infection 
  
ESRD on hemodialysis with left chest wall TDC in place 
[facility]   
 dr. Damon Gutierres is following 
  
CHF: EF was 55% per Echo.  
  
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 
  
Decubitus ulcer of the left buttock, stage II: Wound care consult 
  
Type 2 diabetes mellitus: Sliding scale insulin 
  
CAD: Continue home medications 
  
Severe peripheral arterial disease with triple amputation 
  
Fall precaution   
  
Palliative care consult   
  
35 total min's spent on patient care including >50% on counseling/coordinating care. Discussed the above assessments. also discussed labs, medications and hospital course 
  
  
Disposition :tbd,  
        
Subjective:  
  
Pt was seen and examined with the nurse in the morning round.  
  
No acute event Review of systems General: No fevers or chills. Cardiovascular: No chest pain or pressure. No palpitations. Pulmonary: No cough, SOB Gastrointestinal: No nausea, vomiting. Objective:  
  
Visit Vitals BP (!) 100/54 (BP 1 Location: Left upper arm, BP Patient Position: At rest) Pulse 73 Temp 97.3 °F (36.3 °C) Resp 16 Ht 5' (1.524 m) Wt 60.8 kg (134 lb) SpO2 100% BMI 26.17 kg/m² Physical Exam: 
 
Gen: NAD, on 4 L NC Heent:  MMM, NC, AT. Cor: s1s2 RRR. No MRG. PMI mid 5th intercostal space. Resp:  CTA b/l. No w/r/r. Nml effort and diaphragmatic excursion. Abd:  NT ND.  BS positive. No rebound or guarding. No masses. Ext: + R. BKA, L AKA Intake and Output: 
Current Shift:  No intake/output data recorded. Last three shifts:  03/12 1901 - 03/14 0700 In: 260 [I.V.:260] Out: 1000 Labs: Results:  
   
Chemistry Recent Labs  
  03/13/21 
0255 03/12/21 
0549 03/11/21 
2154 * 87 102*  141 140  
K 3.6 3.5 3.7  107 107 CO2 26 26 25 BUN 17 34* 30* CREA 3.32* 5.38* 4.79* CA 7.5*  7.4* 7.3* 7.4* AGAP 9 8 8 BUCR 5* 6* 6* * 500* 538* TP 6.2* 5.8* 5.9* ALB 2.2* 1.6* 1.8*  
GLOB 4.0 4.2* 4.1* AGRAT 0.6* 0.4* 0.4* CBC w/Diff Recent Labs  
  03/14/21 
0650 03/13/21 0255 03/12/21 
0549 03/11/21 
2154 WBC 2.5* 2.8* 3.3* 3.2*  
RBC 4.15* 4.40* 4.55* 4.77 HGB 9.1* 9.6* 9.9* 10.7* HCT 28.9* 31.1* 32.1* 33.2*  
PLT 60* 39* 41* 49* GRANS  --   --   --  62  
LYMPH  --   --   --  19* EOS  --   --   --  3 Cardiac Enzymes Recent Labs  
  03/12/21 
1830 03/12/21 
1229 CPK 76 82 CKND1 2.6 2.6 Coagulation Recent Labs  
  03/13/21 0255 03/11/21 
2154 PTP 22.4* 21.8* INR 2.0* 2.0* APTT  --  55.8* Lipid Panel No results found for: CHOL, CHOLPOCT, CHOLX, CHLST, CHOLV, 536063, HDL, HDLP, LDL, LDLC, DLDLP, [id number], VLDLC, VLDL, TGLX, TRIGL, TRIGP, TGLPOCT, CHHD, CHHDX  
BNP No results for input(s): BNPP in the last 72 hours. Liver Enzymes Recent Labs  
  03/13/21 0255 TP 6.2* ALB 2.2* * Thyroid Studies No results found for: T4, T3U, TSH, TSHEXT Procedures/imaging: see electronic medical records for all procedures/Xrays and details which were not copied into this note but were reviewed prior to creation of Plan Medications Reviewed Em Kellogg MD

## 2021-03-14 NOTE — ROUTINE PROCESS
Bedside and Verbal shift change report given to ULYSSES Brunner (oncoming nurse) by Saleem Kuo. Pankaj Umaña RN (offgoing nurse). Report included the following information SBAR, Kardex, MAR, Accordion and Recent Results. Pt had an uneventful night. Patient Vitals for the past 12 hrs: 
 Temp Pulse Resp BP SpO2  
03/15/21 0315 97.4 °F (36.3 °C) 95 16 (!) 107/59 100 % 03/15/21 0048 97.6 °F (36.4 °C) 86 16 128/78 100 % 03/14/21 2107 97.6 °F (36.4 °C) 79 16 107/64 100 % Bedside and Verbal shift change report given to [address] [place] [address] (oncoming nurse) by Eric Moreno. Doris Brunner RN (offgoing nurse).  Report included the following information SBAR, Kardex, Accordion and Cardiac Rhythm SR.

## 2021-03-14 NOTE — PROGRESS NOTES
Assessment: Hypoxia (3/11/2021)   
  Pleural effusion on left (3/11/2021)   
  DM (diabetes mellitus), type 2 with renal complications ([address] (7/40/8244)   
  ESRD (end stage renal disease) on dialysis ([address] (3/11/2021)   
  CAD (coronary artery disease) (3/12/2021)   
  Severe peripheral arterial disease ([address] (3/12/2021)   
  Amputee, lower limb ([address] (3/12/2021)   
  Amputee, upper limb, right (3/12/2021)   
  CHF (congestive heart failure) ([address] (3/12/2021)   
  Thrombocytopenia ([address] (3/12/2021)   
  Decubitus ulcer of left buttock, stage 2 ([address] (3/12/2021)   
  Fever (3/12/2021) 
  
 COVID19 positive Hypocalcemia 
  
Plan:   
HD MWF On retactir PO calcitriol Regular diet PTH is low? Increase calcium bath low Calcium?  
  
CC: PT is eating As per nursing staff, pt is doing ok 
  
  
Interval History: no interval change since yesterday,  Pt remains in COVID 19 cohort 
  
  
Subjective:  
PT does not have any somatic complaints 
  Blood pressure 126/74, pulse 84, temperature 97.2 °F (36.2 °C), resp. rate 16, height (P) 5' (1.524 m), weight 60.8 kg (134 lb), SpO2 100 %. awake and alert Intake/Output Summary (Last 24 hours) at 3/14/2021 1244 Last data filed at 3/13/2021 2159 Gross per 24 hour Intake 260 ml Output  Net 260 ml Recent Labs  
  03/14/21 
7687 WBC 2.5* Lab Results Component Value Date/Time Sodium 138 03/14/2021 06:50 AM  
 Potassium 4.1 03/14/2021 06:50 AM  
 Chloride 105 03/14/2021 06:50 AM  
 CO2 25 03/14/2021 06:50 AM  
 Anion gap 8 03/14/2021 06:50 AM  
 Glucose 285 (H) 03/14/2021 06:50 AM  
 BUN 38 (H) 03/14/2021 06:50 AM  
 Creatinine 5.07 (H) 03/14/2021 06:50 AM  
 BUN/Creatinine ratio 7 (L) 03/14/2021 06:50 AM  
 GFR est AA 14 (L) 03/14/2021 06:50 AM  
 GFR est non-AA 12 (L) 03/14/2021 06:50 AM  
 Calcium 6.5 (L) 03/14/2021 06:50 AM  
  
 
Current Facility-Administered Medications Medication Dose Route Frequency Provider Last Rate Last Admin  [Held by provider] aspirin chewable tablet 81 mg  81 mg Oral DAILY Stacey Vivas MD   81 mg at 21 5824  collagenase (SANTYL) 250 unit/gram ointment   Topical DAILY Stacey Vivas MD   Given at 21 1700  
 docusate sodium (COLACE) capsule 100 mg  100 mg Oral DAILY Stacey Vivsa MD   100 mg at 21 1000  polyvinyl alcohol-povidon(PF) (REFRESH CLASSIC) 1.4-0.6 % ophthalmic solution 1 Drop  1 Drop Both Eyes PRN Stacey Vivas MD      
 midodrine (PROAMATINE) tablet 10 mg  10 mg Oral TID WITH MEALS Stacey Vivas MD   10 mg at 21 1000  traZODone (DESYREL) tablet 50 mg  50 mg Oral QHS Stacey Vivas MD   50 mg at 21  
 diclofenac (VOLTAREN) 1 % topical gel 2 g  2 g Topical DAILY PRN Stacey Vivas MD      
 HYDROcodone-acetaminophen [facility]) 5-325 mg per tablet 1 Tab  1 Tab Oral Q8H PRN Stacey Vivas MD   1 Tab at 21 5654  fluticasone propionate (FLONASE) 50 mcg/actuation nasal spray 2 Spray  2 Spray Both Nostrils DAILY Stacey Vivas MD   2 [place] at 21 0900  vit B Cmplx 3-FA-Vit C-Biotin (NEPHRO MIRACLE RX) tablet 1 Tab  1 Tab Oral DAILY Stacey Vivas MD   1 Tab at 21 1001  epoetin toribio-epbx (RETACRIT) injection 6,000 Units  6,000 Units SubCUTAneous Q MON, WED & FRI Eleuterio Benjamin, DO      
 Vancomycin - Pharmacy to Dose  1 Each Other Rx Dosing/Monitoring Minus MD Kerry      
 cefTRIAXone (ROCEPHIN) 1 g in sterile water (preservative free) 10 mL IV syringe  1 g IntraVENous Q24H Eliana Grady MD   1 g at 21  famotidine (PEPCID) tablet 10 mg  10 mg Oral QPM Stacey Vivas MD   10 mg at 21  acetaminophen (TYLENOL) tablet 650 mg  650 mg Oral Q6H PRN Minus MD Kerry   650 mg at 21 170 Or  acetaminophen (TYLENOL) suppository 650 mg  650 mg Rectal Q6H PRN Minus MD Kerry      
 guaiFENesin-dextromethorphan [facility] DM) 100-10 mg/5 mL syrup 5 mL  5 mL Oral Q4H PRN Jolynn Ramirez MD      
 dexamethasone (DECADRON) 4 mg/mL injection 6 mg  6 mg IntraVENous Q24H Jolynn Ramirez MD   6 mg at 03/13/21 1811  
 0.9% sodium chloride infusion 250 mL  250 mL IntraVENous PRN Jolynn Ramirez MD      
 melatonin (rapid dissolve) tablet 5 mg  5 mg Oral QHS Jolynn Ramirez MD   5 mg at 03/13/21 2149  
 albumin human 25% (BUMINATE) solution 25 g  25 g IntraVENous DIALYSIS PRN Eleuterio Benjamin R, DO   25 g at 03/12/21 1932  sodium chloride (NS) flush 5-10 mL  5-10 mL IntraVENous PRN Toby Grady MD      
 azithromycin (ZITHROMAX) 500 mg in 0.9% sodium chloride 250 mL (VIAL-MATE)  500 mg IntraVENous Q24H Toby Grady MD   500 mg at 03/13/21 2159  sodium chloride (NS) flush 5-40 mL  5-40 mL IntraVENous Q8H Lalito WOODALL MD   10 mL at 03/14/21 0701  
 sodium chloride (NS) flush 5-40 mL  5-40 mL IntraVENous PRN Tabitha Rangel MD      
 polyethylene glycol (MIRALAX) packet 17 g  17 g Oral DAILY PRN Tabitha Rangel MD      
 promethazine (PHENERGAN) tablet 12.5 mg  12.5 mg Oral Q6H PRN Tabitha Rangel MD      
 Or  
 ondansetron [facility]) injection 4 mg  4 mg IntraVENous Q6H PRN Tabitha Rangel MD      
 insulin lispro (HUMALOG) injection   SubCUTAneous AC&HS Tabitha Rangel MD   6 Units at 03/14/21 0701  
 glucose chewable tablet 16 g  16 g Oral PRN Tabitha Rangel MD      
 glucagon (GLUCAGEN) injection 1 mg  1 mg IntraMUSCular PRN Tabitha Rangel MD      
 dextrose 10% infusion 125-250 mL  125-250 mL IntraVENous PRN Tabitha Rangel MD

## 2021-03-15 NOTE — DIALYSIS
TREATMENT SUMMARY Patient dialyzed in room 350 Tolerated treatment well with asymptomatic hypotension with SP >90. Patient given 25g albumin to keep SP >90 LIJ TDC functioning well without complication of BFR or accessing   
2000 ml removed via UF with a with a net removal 1500 ml Report given to Jesusita Vilchis RN with all questions answered TREATMENT  NOTES  
  
1100 MS Liz at bedside, orders increase UF to 1500 ml 
 
                                                                                        
   
 
ACUTE HEMODIALYSIS FLOW SHEET 
  
PATIENT INFORMATION [address]       DR. Georgina Barnes   
[]1st Time Acute  []Stat[x] Routine []Urgent []Chronic Unit  
[]Acute Room [x]Bedside  []ICU/CCU []ER Isolation Precautions: [x]Dialysis[] Airborne []Contact []Droplet []Reverse Special Considerations:_______  [] Blood Consent Verified  []N/A Allergies:[] NKA Allergies Benadryl Extra Strength [Diphenhydramine-zinc Acetate] Unable to Obtain Not Specified  3/11/2021 Gentamicin Unable to Obtain Not Specified  3/11/2021 Code Status [x]Full Code [] DNR  [] Other_____ Diet: [] Renal [] NPO [x] Diabetic  
[] Enteral Feeding [] Cardiac Diabetic: []Yes []No 
  
[x]Signed Treatment Consent Verified  
[x] Time Out/ Safety Check PRIMARY NURSE REPORT: FIRST INITIAL/ LAST NAME/TITLE 
PRE Rolando Lemus RN  TIME: 0900 ACCESS CATHETER ACCESS: [] N/A  [] RIGHT  [x] LEFT  [x] IJ  [] SUBCL [] FEM [] First use X-ray  [x] Tunnel     [] Non-Tunneled [x] No S/S infection  [] Redness [] Drainage  [] Cultured [] Swelling [] Pain  
                 [x] Medical Aseptic [x] Prep Dressing Changed  
               [] Clotted [] Patent []      Flows: [] Good [] Poor [] Reversed              If Access Problem Dr. Mahnaz Calabrese: [] Yes [] No    Date:_____  [] N/A[]  
GRAFT/FISTULA ACCESS:  [x] N/A  [] RIGHT  [] LEFT  [] UE   [] LE   
   [] AVG  [] AVF [] BUTTONHOLE 
  [] +BRUIT/THRILL [] MEDICAL ASEPTIC PREP [] No S/S infection  [] Redness [] Drainage  [] Cultured [] Swelling [] Pain If Access Problem Dr. Pankaj Chung: [] Yes [] No    Date:______ [] N/A  
GENERAL ASSESSMENT  
LUNGS:  SaO2% ____ [] Clear [] Coarse [x] Crackles [] Wheezing  
                                      [x] Diminished Location: [] RLL [] LLL [] RUL [] RML [] DOLORES   
COUGH:  [] Productive  [] Loose[] Dry [x] N/A  
RESPIRATIONS: [x] Easy []Labored THERAPY: [] RA   [x] NC __3___. L/min    Mask: [] NRB [] Venti  _____O2%    
             [] Ventilator [] Intubated [] Trach [] BiPap [] CPap [] HI Flow CARDIAC: [x] Regular [] Irregular [] Pericardial Rub [] JVD Monitored Rhythm:__NSR___ [] N/A  
EDEMA: [x] None [] Generalized [] Facial [] Pedal [] UE [] LE 
           [] Pitting [] 1 [] 2 [] 3 [] 4    [] Right [] Left [] Bilateral  
SKIN:    [x] Warm [] Hot [] Cold  [x] Dry [] Pale [] Diaphoretic  
           [] Flushed [] Jaundiced [] Cyanotic [] Rash [] Weeping LOC:    [x] Alert  Oriented to: [x] Person [x] Place [x] Time  
          [] Confused [] Lethargic [] Medicated [] Non-responsive GI/ABDOMEN: [x] Flat [] Distended [x] Soft [] Firm [] Diarrhea [x] Bowel Sounds Present [] Nausea [] Vomiting PAIN: [x] 0 [] 1 [] 2 [] 3 [] 4 [] 5 [] 6 [] 7 [] 8 [] 9 [] 10 Scale 1-10 Action/Follow Up_____ MOBILITY: [] Amb [] Amb/Assist [x] Bed  [] Wheelchair CURRENT LABS HBsAg ONLY: Date Drawn:   3/12/2021          [x] Negative [] Positive [] Unknown. HBsAb: Date Drawn:  3/12/2021            [] Susceptible <10 [x] Immune ?10 [] Unknown Date of Current Labs:  ATTACHED  
 
EDUCATION Person Educated: [x] Patient [] Other_________ Knowledge base: [] None [x] Minimal [] Substantial   
Barriers to learning  [x] None _______________ Preferred method of learning: [] Written [x] Oral [x] Visual [] Hands on Topic: [x] Access Care [] S&S of infection [x] Fluid Management 
 [] K+  [x] Procedural  [] Albumin [] Medications [] Tx Options [] Transplant [] Diet [] Other Teaching Tools: [x] Explain [x] Demonstration [] Handout_____ [] Video______ 
CARE PLAN [x] Renal Failure (Adult) Interdisciplinary · Fluid and electrolytes stabilized ? Interventions · Dehydration signs and symptoms (eg: Weight/lab monitoring; vomiting/diarrhea/urine; tenting; mucous membranes; dizziness/lethargy/irritability/confusion; weak pulse; tachycardia; blood pressure; I&O) · Fluid overload signs and symptoms assessment (eg: Body weight increased; dyspnea; edema; hypertension; respiratory crackles/wheezing; JVD; lab monitoring; mental status changes; I&O) · Monitor appropriate lab values · COMPLIANCE WITH PRESCRIBED THERAPY · ARTERIAL ACCESS SITE ASSESSMENT 
· NUTRITION SCREENING 
· Vital signs monitoring per assessed patient condition or unit standard · Cardiac monitoring · Hydration management · Intake and output measurement · Body weight monitoring · Skin care · DIALYSIS 
· Nutrition Care Process per nutrition screen · Oral hygiene care every 2 hours · Pain management · Outcome ? [x] Progressing Towards Goal 
? [] Not Progressing Towards Goals ? [] Goals Met/Resolved ? [] Goals Not Met/ Resolved · Patient/ Family Education ? Progressing Towards Goals RO/HEMODIAYLSIS MACHINE SAFETY CHECKS- BEFORE EACH TREATMENT [x] THE [facility]: Machine Serial #1:  [id number]   RO Serial #[id number] [] THE [facility]: Machine Serial #2:  [id number]   RO Serial #[id number] [] [facility]: Machine Serial #3:  [id number]    Serial #[id number] Alarm Test: [x] Pass  Time_0940_____ [x] RO/Machine Log Complete    [x] Extracorporeal circuit Tested for integrity Dialyzer__[id number]________   Tubing___2011-12_________ Dialysate: pH__7.4_  Temp.__37___Conductivity: Meter __14__ HD Machine__13.8_ [address] TREATMENT AND EVERY 4 HOURS Total Chlorine: [x] Less than 0.1 ppm Time:__0940___2nd Check Time:______ 
(If greater than 0.1 ppm from Primary then every 30 minutes from Secondary) TREATMENT INIATION-WITH DIALYSIS PRECAUTIONS [x] All Connections Secured   [x] Saline Line Double Clamped    [x] Venous Parameters Set [x] Arterial Parameters Set    [x] Prime Given 250 ml    
[x] Air Foam Detector Engaged PRE-TREATMENT  
UF Calculations: Wt to lose:_1500__ml(+) Oral:_0_ml(+)IV Meds/Fluids/Blood prods_0__ml(+) Prime/Rinse__500_ml(=)Total UF Goal__2000__mL Scale Type:[x] Bed scale [] Sling Scale [] Wheel Chair Scale []  Not Ordered [] [] Unable to obtain pt on stretcher/ bed scale malfunctioning Tx Initiation Note: RECEIVED REPORT. PATIENT ALERT AND ORIENTED. VITAL SIGNS WNL. NAD. ALL QUESTIONS ANSWERED WITH PATIENT  SAFETY CHECKS COMPLETE. TIME OUT COMPLETE. TREATMENT INITIATED VIA __LIJ TDC___. NO CONCERNS NOTED. [x] Time Out/Safety Check  Time:__0948___ INTRADIALYTIC MONITORING 
(SEE ATTACHED FLOWSHEET) POST TREATMENT Time Medication Dose Volume Route Initials [facility] Signatures Title Initials Time Lorraine Closs RN PAP Dialyzer cleared: [x] Good [] Fair [] Poor Blood Volume Processed __1500__L Net UF Removed _65.4___mL Post Tx Access: AVF/AVG: Bleeding Stop Time      Art. NA_min Bc.__NA_min []+bruit/thrill Catheter: Locking Solution  [] Heparin 1 ml/1000 units [] Normal Saline Art._____ ml Bc._____ml [x] New caps placed Post Assessment:   
          Skin: [x]Warm [x]Dry []Diaphoretic []Flushed [] Pale []Cyanotic Lungs: []Clear []Coarse [x]Crackles []Wheezing           Cardiac: [x]Regular []Irregular  []Monitored rhythm____ []N/A Edema: [x]None []General []Facial []Pedal  []UE []LE []RIGHT []LEFT Pain: [x]0 []1 []2 []3 []4 []5 []6 []7 []8 []9 []10  
POST Tx Note:TREATMENT COMPLETED. ALL POSSIBLE BLOOD RETURNED. PT TOLERATED WELL. VITAL SIGNS WNL  FOR PATIENT. NAD NOTED. DIALYSIS CATHETER DE ACCESSED, FLUSHED AND LOCKED. STERILE CAPS APPLIED. REPORT GIVEN WITH OPPORTUNITY TO ADDRESS ANY CONCERNS OR QUESTTIONS AND PATIENT STAYS ROOM IN BED WITHOUT DISTRESS OR ACUTE DISCOMFORT. BED LOWED, CALL BELL WITHIN REACH . Primary Nurse Report: First initial/Last name/Title Post Dialysis:__GREYSON Damico RN__         Time:_____1400___________ Abbreviations: AVG-arterial venous graft, AVF-arterial venous fistula, IJ-Internal Jugular, Subcl-Subclavian, Fem-Femoral, Tx-treatment, AP/HR-apical heart rate, DFR-dialysate flow rate, BFR-blood flow rate, AP-arterial pressure, -venous pressure, UF-ultrafiltrate, TMP-transmembrane pressure, Bc-Venous, Art-Arterial, RO-Reverse Osmosis

## 2021-03-15 NOTE — ROUTINE PROCESS
Bedside and Verbal shift change report given to LAKIA Michaels RN (oncoming nurse) by Clint Ochoa RN (offgoing nurse). Report included the following information SBAR, Kardex, Intake/Output and MAR.

## 2021-03-15 NOTE — PROGRESS NOTES
Discharge Planning: Return to LTC facility when medically stable The patient is progressing toward discharge. CM currently working to see if the patient will be able to return to [facility] where he is a resident now that he is COVID positive. CM has confirmed with the dialysis center at the [place] that they can accommodate the patient with a negative pressure room for dialysis when he is discharged. CM to follow the patient's progress and be available to assist with discharge planning as needed. CMS referral placed. Care Management Interventions Mode of Transport at Discharge: S Transition of Care Consult (CM Consult): Discharge Planning, [address] [place] [address] Reviewed: Yes Current Support Network: Nursing Facility(LTC resident at [address]) The Plan for Transition of Care is Related to the Following Treatment Goals : [facility] The Patient and/or Patient Representative was Provided with a Choice of Provider and Agrees with the Discharge Plan?: Yes Name of the Patient Representative Who was Provided with a Choice of Provider and Agrees with the Discharge Plan: pt 
Freedom of Choice List was Provided with Basic Dialogue that Supports the Patient's Individualized Plan of Care/Goals, Treatment Preferences and Shares the Quality Data Associated with the Providers?: Yes Discharge Location Discharge Placement: Skilled nursing facility(vs LTC)

## 2021-03-15 NOTE — DIABETES MGMT
Diabetes Patient/Family Education Record Factors That  May Influence Patients Ability  to Learn or  Comply with Recommendations 
 []   Language barrier    []   Cultural needs   []   Motivation  
 []   Cognitive limitation    []   Physical   []   Education  
 []   Physiological factors   []   Hearing/vision/speaking impairment   []   Mosque beliefs []   Financial factors   []  Other:   [x]  No factors identified at this time. Person Instructed: 
 [x]   Patient   []   Family   []  Other Preference for Learning: 
 [x]   Verbal   []   Written   []  Demonstration Level of Comprehension & Competence:   
[x]  Good                                      [] Fair                                     []  Poor                             []  Needs Reinforcement  
[x]  Teachback completed Education Component:  
[x]  Medication management, pt confirmed no PTA DM meds  
[]  Nutritional management -obtain usual meal pattern  
[]  Exercise  
[]  Signs, symptoms, and treatment of hyperglycemia and hypoglycemia  
[] Prevention, recognition and treatment of hyperglycemia and hypoglycemia  
[]  Importance of blood glucose monitoring and how to obtain a blood glucose meter   
[]  Instruction on use of the blood glucose meter [x]  Discuss the importance of HbA1C monitoring   
[]  Sick day guidelines  
[]  Proper use and disposal of lancets, needles, syringes or insulin pens (if appropriate) []  Potential long-term complications (retinopathy, kidney disease, neuropathy, foot care)  
[] Information about whom to contact in case of emergency or for more information   
[]  Goal:  Patient/family will demonstrate understanding of Diabetes Self Management Skills by: (date) _______ Plan for post-discharge education or self-management support: 
  [] Outpatient class schedule provided            [] Patient Declined 
  [] Scheduled for outpatient classes (date) _______ Verify: 
Does patient understand how diabetes medications work? __n/a__________________________ Does patient know what their most recent A1c is? ________yes___________________________ Does patient monitor glucose at home? ______________SNF_____________________________ Does patient have difficulty obtaining diabetes medications or testing supplies? ___no______________ Nyasia Deng MS, RN, CDE Glycemic Control Team 
[phone] Pager [phone] (M-TH 8:00-4:30P) *After Hours pager [phone]

## 2021-03-15 NOTE — PROGRESS NOTES
Hospitalist Progress Note-critical care note Patient: Mark Moreno MRN: [id number]  [facility]: [id number] YOB: 1960  Age: 64 y.o. Sex: male DOA: 3/11/2021 LOS:  LOS: 4 days Chief complaint: fever, chf, decubitus ulcer ,  thrombocytopenia , chf , esrd on hd pleural effusion Assessment/Plan Hospital Problems  Date Reviewed: 3/11/2021 Codes Class Noted POA  
 CAD (coronary artery disease) ICD-10-CM: I25.10 ICD-9-CM: 414.00  3/12/2021 Yes Severe peripheral arterial disease (HCC) ICD-10-CM: I73.9 ICD-9-CM: 443.9  3/12/2021 Yes [facility]putee, lower limb ([facility] ICD-10-CM: W57.447 ICD-9-CM: V49.70  3/12/2021 Yes Amputee, upper limb, right ICD-10-CM: Z89.201 ICD-9-CM: V49.60  3/12/2021 Yes  
   
 CHF (congestive heart failure) (HCC) ICD-10-CM: I50.9 ICD-9-CM: 428.0  3/12/2021 Yes Thrombocytopenia ([facility] ICD-10-CM: D69.6 ICD-9-CM: 287.5  3/12/2021 Yes Decubitus ulcer of left buttock, stage 2 ([facility] ICD-10-CM: R20.582 ICD-9-CM: 707.05, 707.22  3/12/2021 Yes Fever ICD-10-CM: R50.9 ICD-9-CM: 780.60  3/12/2021 Yes Pneumonia due to 2019 novel coronavirus ICD-10-CM: U07.1, J12.82 ICD-9-CM: 480.8  3/12/2021 * (Principal) Hypoxia ICD-10-CM: R09.02 
ICD-9-CM: 799.02  3/11/2021 Yes Pleural effusion on left ICD-10-CM: J90 ICD-9-CM: 511.9  3/11/2021 Yes DM (diabetes mellitus), type 2 with renal complications (HCC) [facility]-98-BQ: E11.29 ICD-9-CM: 250.40  3/11/2021 Yes ESRD (end stage renal disease) on dialysis ([facility] ICD-10-CM: N18.6, Z99.2 ICD-9-CM: 585.6, V45.11  3/11/2021 Yes 77-year-old male with type 2 diabetes mellitus with end-stage renal disease on hemodialysis, severe peripheral arterial disease with 3 extremity amputations, CAD, thrombocytopenia, CHF, and CAD is admitted with hypoxia, fever, and left sided pleural effusion.  covid 19 pna is ruled in  
  
Hypoxialike fluid overloaded and pna-covid 19 Resolved  
 
pna due to covid 19 -rapid positive Continue steroid, anti-oxidants cocktail Waiting for plasma Will check pcr covid 19-ordered not sending  
 
  
Pleural effusion on left with layering Ct indicated pna  
 
ESRD on hemodialysis with left chest wall TDC in place 
dialysis Monday/Wednesday/Friday at the [place]. HD today  
  
CHFEF was 55%  
ce wnl Echo ef wnl  
  
Pancytopenia Will hold aspirin now So far no bleeding noted Abdomen ultrasound Decubitus ulcer of the left buttock, stage II 
Wound care consult 
  
Type 2 diabetes mellitus Sliding scale insulin 
  
CAD Continue home medications 
  
Severe peripheral arterial disease with triple amputation Fall precaution Subjective : feel better Pt was seen and examed in full PPE Palliative care on board 35 total min's spent on patient care including >50% on counseling/coordinating care. Will minesh larios , bcx negative Disposition :tbd, Review of systems: 
 
General: No fevers or chills. Cardiovascular: No chest pain or pressure. No palpitations. Pulmonary: No shortness of breath. Gastrointestinal: No nausea, vomiting. Vital signs/Intake and Output: 
Visit Vitals BP (P) 103/72 Pulse (P) 87 Temp 97.6 °F (36.4 °C) (Oral) Resp (P) 16 Ht 5' (1.524 m) Wt 60.8 kg (134 lb) SpO2 100% BMI 26.17 kg/m² Current Shift:  No intake/output data recorded. Last three shifts:  03/13 1901 - 03/15 0700 In: 520 [I.V.:520] Out: - Physical Exam: 
General: WD, WN. Alert, cooperative, no acute distress HEENT: NC, Atraumatic. PERRLA, anicteric sclerae. Lungs: CTA Bilaterally. No Wheezing/Rhonchi/Rales. tdc site is good skin no sign of infection Heart:  Regular  rhythm,  + murmur, No Rubs, No Gallops Abdomen: Soft, Non distended, Non tender. +Bowel sounds, Extremities: No c/c, rt bka, left aka , rt hand amputated, left 5th finger amputated Psych:   Not anxious or agitated. Neurologic:  No acute neurological deficit. Labs: Results:  
   
Chemistry Recent Labs  
  03/15/21 
0645 03/14/21 
2786 03/13/21 
0255 * 285* 120*  138 140  
K 4.2 4.1 3.6  105 105 CO2 22 25 26 BUN 52* 38* 17  
CREA 6.15* 5.07* 3.32* CA 6.3* 6.5* 7.5*  7.4* AGAP 10 8 9 BUCR 8* 7* 5* * 466* 524* TP 6.1* 6.1* 6.2* ALB 2.2* 2.3* 2.2*  
GLOB 3.9 3.8 4.0 AGRAT 0.6* 0.6* 0.6* CBC w/Diff Recent Labs  
  03/14/21 
0650 03/13/21 
0255 WBC 2.5* 2.8*  
RBC 4.15* 4.40* HGB 9.1* 9.6* HCT 28.9* 31.1*  
PLT 60* 39* Cardiac Enzymes Recent Labs  
  03/12/21 
1830 CPK 76 CKND1 2.6 Coagulation Recent Labs  
  03/13/21 
0255 PTP 22.4* INR 2.0* Lipid Panel No results found for: CHOL, CHOLPOCT, CHOLX, CHLST, CHOLV, 724145, HDL, HDLP, LDL, LDLC, DLDLP, 954922, VLDLC, VLDL, TGLX, TRIGL, TRIGP, TGLPOCT, CHHD, CHHDX  
BNP No results for input(s): BNPP in the last 72 hours. Liver Enzymes Recent Labs  
  03/15/21 
0645 TP 6.1* ALB 2.2* * Thyroid Studies No results found for: T4, T3U, TSH, TSHEXT, TSHEXT Procedures/imaging: see electronic medical records for all procedures/Xrays and details which were not copied into this note but were reviewed prior to creation of Plan Xr Chest [facility] Result Date: 3/11/2021 EXAM: XR CHEST PORT CLINICAL INDICATION/HISTORY: meets SIRS criteria -Additional: None COMPARISON: None TECHNIQUE: Portable frontal view of the chest _______________ FINDINGS: SUPPORT DEVICES: Left chest wall tunneled dialysis catheter tip at the cavoatrial junction. HEART AND MEDIASTINUM: Prior median sternotomy and CABG. Cardiomediastinal silhouette within normal limits. LUNGS AND PLEURAL SPACES: Small layering left effusion. No dense consolidation or pneumothorax. _______________ Small layering left effusion.   
 
 
Mariah Snyder MD

## 2021-03-15 NOTE — PROGRESS NOTES
Problem: Pressure Injury - Risk of 
Goal: *Prevention of pressure injury Description: Document Lake Scale and appropriate interventions in the flowsheet. Outcome: Progressing Towards Goal 
Note: Pressure Injury Interventions: 
  
 
Moisture Interventions: Apply protective barrier, creams and emollients Activity Interventions: Increase time out of bed, Pressure redistribution bed/mattress(bed type) Mobility Interventions: HOB 30 degrees or less, Pressure redistribution bed/mattress (bed type) Nutrition Interventions: Document food/fluid/supplement intake Friction and Shear Interventions: Apply protective barrier, creams and emollients Problem: Patient Education: Go to Patient Education Activity Goal: Patient/Family Education Outcome: Progressing Towards Goal 
  
Problem: Falls - Risk of 
Goal: *Absence of Falls Description: Document [place] Flow Fall Risk and appropriate interventions in the flowsheet. Outcome: Progressing Towards Goal 
Note: Fall Risk Interventions: 
  
 
  
 
Medication Interventions: Teach patient to arise slowly Elimination Interventions: Bed/chair exit alarm Problem: Patient Education: Go to Patient Education Activity Goal: Patient/Family Education Outcome: Progressing Towards Goal

## 2021-03-15 NOTE — PROGRESS NOTES
Palliative Medicine Patient completed an advance medical directive today. Copy placed on chart for scanning into EMR and copy given to patient for personal records. Copy emailed to [facility] @ [email or web address] 
 
Primary Decision Maker (Health Care Agent): [facility] Relationship to patient: parent Phone number: [phone] [x] Named in a scanned document  
[] Legal Next of Kin 
[] Guardian Secondary Decision Maker (First Alternate Health Care Agent):  Greyson Fuchs Relationship to patient: sibling Phone number: [phone] [x] Named in a scanned document  
[] Legal Next of Kin 
[] Guardian ACP documents you currently have include: 
[x] Advance Directive or Living Will 
[] Durable Do Not Resuscitate 
[] Physician Orders for Scope of Treatment (POST) [] Medical Power of  
[] Other CODE STATUS: FULL CODE with aggressive resuscitative measures Date of Initial Consult: 3/15/2021 Reason for Consult: goals of care decisions Requesting Provider: Dr Irish Baker Primary Care Physician:  none listed INITIAL VISIT   (seen with Maddie Rehman NP) PMH: DM; peripheral vascular disease s/p bilateral lower limb amputations and right upper limb amputation; ESRD on dialysis; SNF resident; CAB s/p [facility] Hospital recap:Came to the ED on 3/11/2021 from [facility] with fever and cough. Rapid COVID test (+). Admitted to the floor for respiratory management and HD,which he normally gets at the [facility] [place]. Pertinent Diagnostics 3/12/2021: rapid COVID: POSITIVE 
3/12/2021: Chest CT: 1. Dependent regions of consolidation bilaterally, findings favored to reflect 
atelectasis particularly at the right lung base with superimposed pneumonia involving the left lower lobe difficult to exclude given the imaging appearance. 2. Mild interstitial edema and small bilateral pleural effusions. 3. Extensive atherosclerotic vascular disease both above and below the diaphragm.   
4. Small potentially calcified pulmonary nodules right middle lobe likely reflecting sequela of prior granulomatous infection 3/12/90706: ECHO: EF 55-60%: moderate mitral regurgitation; mild to moderate tricuspid regurgitation Pertinent medication: azithromycin; rocephin; midodrine Seen in room 350. He was awake, alert and conversational. Oriented x 4. Dialysis ongoing. States feeling overall well. No respiratory distress. On room air. States he has lived at [facility] for 3 years. Introduced role of palliative medicine for inpatients. Discussed completing AMD. States that he has no children. His mother is alive and healthy and his choice for primary surrogate decision maker and he would like his sister, Baron Martinez, to be his secondary surrogate decision maker. Asked his thoughts about code status and at this point in time, he chooses to remain full code. I called his mother to let her know the new AMD was completed. She did tell me that he had COVID in November and had received both vaccines. Disposition plan: anticipate return to his facility. Palliative Medicine remains available for any questions or concerns Allison Barragan RN, MSN 
P: [phone]

## 2021-03-15 NOTE — CONSULTS
Full note to follow. Patient is awake alert and oriented x4, completed an advanced medical directive today naming his mother Bud Adkins as primary medical power of  and sister Whitney Pérez as secondary medical power of . Patient is getting dialysis at bedside, denies pain or shortness of breath. States dialysis is going well. Discussed the benefits and burdens of CPR in the event of cardiopulmonary arrest in the setting of end-stage renal disease coronary artery disease, multiple amputations with debility, CHF, and other chronic comorbidities. At this time patient wishes to remain full code with full interventions. We will continue to follow for support.

## 2021-03-15 NOTE — PROGRESS NOTES
Patient:  Dori Felix :  1960 Gender:  male MRN #:  [id number] Assessment:  
He is 64 yrs male with h/o ESRD on HD ( //) DM-2, severe peripheral arterial disease with 3 extremity amputations, CAD, thrombocytopenia, CHF, and CAD was  admitted with hypoxia, fever, and left sided pleural effusion. He has COVID -19 infection on azithro/ceftriaxoe and dexamethasone.  
  
 
 
Plan: # ESRD on HD- // schedule. Patient examined and Labs reviewed 
- Dialysis today for 3.5 hours, UF 1 kg - Midodrine 10 mg TID for hypotension, 30 minutes prior on dialysis day 
-Next dialysis on Wednesday - Intake and Output recording - Dose all meds for ESRD # Anemia- Anemia of ESRD, Hemoglobin is below target range - Retacrit during HD #Hemodynamics-  Intermittent episodes of Hypotension  
- continue Midodrine 10 mg TID # BMD- corrected calcium is low Increase calcium bath for hypocalcemia PTH is suppressed Phos was 3.5 on 3/13 Subjective/Interval Events/ROS   
- No acute overnight events - Intermittent borderline low BP , his BP was stable while on dialysis , on 3 lt/min nasal canula - As per dialysis nurse he is breathing fine and tolerating HD Intake/Output Summary (Last 24 hours) at 3/15/2021 7865 Last data filed at 3/14/2021 2223 Gross per 24 hour Intake 260 ml Output  Net 260 ml Blood pressure (!) 107/59, pulse 95, temperature 97.4 °F (36.3 °C), resp. rate 16, height 5' (1.524 m), weight 60.8 kg (134 lb), SpO2 100 %. Exam: patient not examined physically due to covid-19 infection and respiratory isolation , discussed with primary team and dialysis nurse. Pt awake and alert On 3 lt/min oxygen Lying flat in bed No edema BMP:  
Lab Results Component Value Date/Time   03/15/2021 06:45 AM  
 K 4.2 03/15/2021 06:45 AM  
  03/15/2021 06:45 AM  
 CO2 22 03/15/2021 06:45 AM  
 AGAP 10 03/15/2021 06:45 AM  
  (H) 03/15/2021 06:45 AM  
 BUN 52 (H) 03/15/2021 06:45 AM  
 CREA 6.15 (H) 03/15/2021 06:45 AM  
 GFRAA 11 (L) 03/15/2021 06:45 AM  
 GFRNA 9 (L) 03/15/2021 06:45 AM  
 
 
Recent Results (from the past 24 hour(s)) GLUCOSE, POC Collection Time: 03/14/21 11:35 AM  
Result Value Ref Range Glucose (POC) 378 (H) 70 - 110 mg/dL GLUCOSE, POC Collection Time: 03/14/21  3:41 PM  
Result Value Ref Range Glucose (POC) 106 70 - 110 mg/dL GLUCOSE, POC Collection Time: 03/14/21  8:57 PM  
Result Value Ref Range Glucose (POC) 153 (H) 70 - 110 mg/dL METABOLIC PANEL, COMPREHENSIVE Collection Time: 03/15/21  6:45 AM  
Result Value Ref Range Sodium 138 136 - 145 mmol/L Potassium 4.2 3.5 - 5.5 mmol/L Chloride 106 100 - 111 mmol/L  
 CO2 22 21 - 32 mmol/L Anion gap 10 3.0 - 18 mmol/L Glucose 139 (H) 74 - 99 mg/dL BUN 52 (H) 7.0 - 18 MG/DL Creatinine 6.15 (H) 0.6 - 1.3 MG/DL  
 BUN/Creatinine ratio 8 (L) 12 - 20 GFR est AA 11 (L) >60 ml/min/1.73m2 GFR est non-AA 9 (L) >60 ml/min/1.73m2 Calcium 6.3 (L) 8.5 - 10.1 MG/DL Bilirubin, total 0.4 0.2 - 1.0 MG/DL  
 ALT (SGPT) 35 16 - 61 U/L  
 AST (SGOT) 49 (H) 10 - 38 U/L Alk. phosphatase 462 (H) 45 - 117 U/L Protein, total 6.1 (L) 6.4 - 8.2 g/dL Albumin 2.2 (L) 3.4 - 5.0 g/dL Globulin 3.9 2.0 - 4.0 g/dL A-G Ratio 0.6 (L) 0.8 - 1.7 Georgiana Phillipsman Collection Time: 03/15/21  6:45 AM  
Result Value Ref Range Vancomycin, random 21.8 5.0 - 40.0 UG/ML  
GLUCOSE, POC Collection Time: 03/15/21  7:05 AM  
Result Value Ref Range Glucose (POC) 117 (H) 70 - 110 mg/dL Discussed with HD  Nurse and hospitalist. 
 
Thank you very much for consult. I will continue to follow Payal Hale MD 
Nephrology -[facility], [facility].

## 2021-03-15 NOTE — DIABETES MGMT
GLYCEMIC CONTROL PLAN OF CARE Diabetes Management:  
  
Assessment: known h/o ESRD HD, DM2, HbA1C within recommended range for age + comorbids BG in target range (non-ICU\" < 180 ; -180):  [x] Yes  [] No   
 
Steroids: Decadron 6 mg daily TDD previous day = 24 units - Humalog Very Insulin Resistant Corrective Coverage Recommend: - Humalog Normal Insulin Sensitivity Corrective Coverage (orders entered per protocol) - BG trending down, monitor PPG excursions, pt may benefit from scheduled dose of insulin Most recent blood glucose results:  
Lab Results Component Value Date/Time  (H) 03/15/2021 06:45 AM  
 GLUCPOC 114 (H) 03/15/2021 10:11 AM  
 GLUCPOC 117 (H) 03/15/2021 07:05 AM  
 GLUCPOC 153 (H) 03/14/2021 08:57 PM  
 
 
 
Hypo: No 
 
HbA1C: equivalent  to ave BGlucose of 141 mg/dl for 2-3 months prior to admission Lab Results Component Value Date/Time Hemoglobin A1c 6.7 (H) 03/12/2021 05:48 AM  
 
 
Adequate glycemic control PTA:  [x] Yes  [] No   
 
Home diabetes medications: 
Key Antihyperglycemic Medications Patient is on no antihyperglycemic meds. Goals:  Blood glucose will be within target range of 70 - 180 mg/dL by: 3/22 Diet:  
Active Orders Diet DIET DIABETIC CONSISTENT CARB Regular Patient Vitals for the past 100 hrs: 
 % Diet Eaten 03/12/21 1030 25 % Education:  ___X__ Refer to Diabetes Education Record 
                     _____ Education not indicated at this time Nyasia Deng MS, RN, CDE Glycemic Control Team 
[phone] Pager [phone] (M-TH 8:00-4:30P) *After Hours pager [phone]

## 2021-03-16 NOTE — ROUTINE PROCESS
Bedside and Verbal shift change report given to NAYELI Calderon RN (oncoming nurse) by Cecilia Tapia RN (offgoing nurse). Report included the following information SBAR, Kardex, Intake/Output and MAR.

## 2021-03-16 NOTE — PROGRESS NOTES
Hospitalist Progress Note-critical care note Patient: Ulices Gray MRN: [id number]  [facility]: [id number] YOB: 1960  Age: 64 y.o. Sex: male DOA: 3/11/2021 LOS:  LOS: 5 days Chief complaint: fever, chf, decubitus ulcer ,  thrombocytopenia , chf , esrd on hd pleural effusion Assessment/Plan Hospital Problems  Date Reviewed: 3/11/2021 Codes Class Noted POA  
 CAD (coronary artery disease) ICD-10-CM: I25.10 ICD-9-CM: 414.00  3/12/2021 Yes Severe peripheral arterial disease (HCC) ICD-10-CM: I73.9 ICD-9-CM: 443.9  3/12/2021 Yes Amputee, lower limb ([facility] ICD-10-CM: T20.407 ICD-9-CM: V49.70  3/12/2021 Yes Amputee, upper limb, right ICD-10-CM: Z89.201 ICD-9-CM: V49.60  3/12/2021 Yes  
   
 CHF (congestive heart failure) (HCC) ICD-10-CM: I50.9 ICD-9-CM: 428.0  3/12/2021 Yes Thrombocytopenia ([facility] ICD-10-CM: D69.6 ICD-9-CM: 287.5  3/12/2021 Yes Decubitus ulcer of left buttock, stage 2 ([facility] ICD-10-CM: H56.599 ICD-9-CM: 707.05, 707.22  3/12/2021 Yes Fever ICD-10-CM: R50.9 ICD-9-CM: 780.60  3/12/2021 Yes Pneumonia due to 2019 novel coronavirus ICD-10-CM: U07.1, J12.82 ICD-9-CM: 480.8  3/12/2021 * (Principal) Hypoxia ICD-10-CM: R09.02 
ICD-9-CM: 799.02  3/11/2021 Yes Pleural effusion on left ICD-10-CM: J90 ICD-9-CM: 511.9  3/11/2021 Yes DM (diabetes mellitus), type 2 with renal complications (HCC) KUT-94-IX: E11.29 ICD-9-CM: 250.40  3/11/2021 Yes ESRD (end stage renal disease) [facility] ICD-10-CM: N18.6, Z99.2 ICD-9-CM: 585.6, V45.11  3/11/2021 Yes 62-year-old male with type 2 diabetes mellitus with end-stage renal disease on hemodialysis, severe peripheral arterial disease with 3 extremity amputations, CAD, thrombocytopenia, CHF, and CAD is admitted with hypoxia, fever, and left sided pleural effusion.  covid 19 pna is ruled in  
  
Hypoxialike fluid overloaded and pna-covid 19 Resolved  
 
pna due to covid 19 -rapid positive Continue steroid, anti-oxidants cocktail Waiting for plasma when it is ready  
check pcr covid 19 one -still pending 
 
  
Pleural effusion on left with layering Ct indicated pna  
 
ESRD on hemodialysis with left chest wall TDC in place 
dialysis Monday/Wednesday/Friday at the [place]. HD per renal  
  
CHFEF was 55%  
ce wnl Echo ef wnl  
  
Pancytopenia So far no bleeding noted Abdomen ultrasound Decubitus ulcer of the left buttock, stage II 
Wound care consult 
  
Type 2 diabetes mellitus Sliding scale insulin 
  
CAD Continue home medications 
  
Severe peripheral arterial disease with triple amputation Fall precaution Subjective : feel better, feel fine Pt was seen and examed in full PPE Palliative care on board 35 total min's spent on patient care including >50% on counseling/coordinating care. Per cm ,pt was positive for coivd 19 before admission,will waiting for pcr one for confirmation He is doing good so far , cm is working on placement Disposition :planning Thursday Review of systems: 
 
General: No fevers or chills. Cardiovascular: No chest pain or pressure. No palpitations. Pulmonary: No shortness of breath. Gastrointestinal: No nausea, vomiting. Vital signs/Intake and Output: 
Visit Vitals BP (!) 94/56 Pulse 87 Temp 98.2 °F (36.8 °C) Resp 16 Ht 5' (1.524 m) Wt 58.5 kg (129 lb) SpO2 100% BMI 25.19 kg/m² Current Shift:  No intake/output data recorded. Last three shifts:  03/14 1901 - 03/16 0700 In: 260 [I.V.:260] Out: 1500 Physical Exam: 
General: WD, WN. Alert, cooperative, no acute distress HEENT: NC, Atraumatic. PERRLA, anicteric sclerae. Lungs: CTA Bilaterally. No Wheezing/Rhonchi/Rales. tdc site is good skin no sign of infection Heart:  Regular  rhythm,  + murmur, No Rubs, No Gallops Abdomen: Soft, Non distended, Non tender. +Bowel sounds, Extremities: No c/c, rt bka, left aka , rt hand amputated, left 5th finger amputated Psych:   Not anxious or agitated. Neurologic:  No acute neurological deficit. Labs: Results:  
   
Chemistry Recent Labs  
  03/16/21 
0645 03/15/21 
0645 03/14/21 
8365 * 139* 285*  138 138  
K 4.3 4.2 4.1  106 105 CO2 24 22 25 BUN 31* 52* 38* CREA 4.31* 6.15* 5.07* CA 7.6* 6.3* 6.5* AGAP 8 10 8 BUCR 7* 8* 7* * 462* 466* TP 6.5 6.1* 6.1* ALB 2.6* 2.2* 2.3*  
GLOB 3.9 3.9 3.8 AGRAT 0.7* 0.6* 0.6* CBC w/Diff Recent Labs  
  03/14/21 
0650 WBC 2.5*  
RBC 4.15* HGB 9.1*  
HCT 28.9*  
PLT 60* Cardiac Enzymes No results for input(s): CPK, CKND1, OPAL in the last 72 hours. No lab exists for component: Nicole Melisa Coagulation No results for input(s): PTP, INR, APTT, INREXT, INREXT in the last 72 hours. Lipid Panel No results found for: CHOL, CHOLPOCT, CHOLX, CHLST, CHOLV, 077388, HDL, HDLP, LDL, LDLC, DLDLP, 768519, VLDLC, VLDL, TGLX, TRIGL, TRIGP, TGLPOCT, CHHD, CHHDX  
BNP No results for input(s): BNPP in the last 72 hours. Liver Enzymes Recent Labs  
  03/16/21 
0645 TP 6.5 ALB 2.6* * Thyroid Studies No results found for: T4, T3U, TSH, TSHEXT, TSHEXT Procedures/imaging: see electronic medical records for all procedures/Xrays and details which were not copied into this note but were reviewed prior to creation of Plan Xr Chest [facility] Result Date: 3/11/2021 EXAM: XR CHEST PORT CLINICAL INDICATION/HISTORY: meets SIRS criteria -Additional: None COMPARISON: None TECHNIQUE: Portable frontal view of the chest _______________ FINDINGS: SUPPORT DEVICES: Left chest wall tunneled dialysis catheter tip at the cavoatrial junction. HEART AND MEDIASTINUM: Prior median sternotomy and CABG. Cardiomediastinal silhouette within normal limits. LUNGS AND PLEURAL SPACES: Small layering left effusion.  No dense consolidation or pneumothorax. _______________ Small layering left effusion.   
 
 
Selma Gleason MD

## 2021-03-16 NOTE — PROGRESS NOTES
0730 Assumed care of the patient from [facility] (offgoing Nurse). Patient is alert and oriented. Pt denies any pain or discomfort at this moment. bed in low position, call bell within reach. 1836 Convalescent Plasma transfusion started at this time. 1941 Bedside and Verbal shift change report given to Ninfa WALKER RN (oncoming nurse) by Neva Cain RN (offgoing nurse). Report included the following information SBAR, Kardex, MAR, Recent Results and Cardiac Rhythm .

## 2021-03-16 NOTE — PROGRESS NOTES
Patient:  Alistair Bangura :  1960 Gender:  male MRN #:  [id number] Assessment:  
He is 64 yrs male with h/o ESRD on HD ( //) DM-2, severe peripheral arterial disease with 3 extremity amputations, CAD, thrombocytopenia, CHF, and CAD was  admitted with hypoxia, fever, and left sided pleural effusion. He has COVID -19 infection on azithro/ceftriaxone and dexamethasone.  
  
 
 
Plan: # ESRD on HD- // schedule. He tolerated dialysis yesterday with 1.5 kg UF  
- Labs reviewed No clinical indication of dialysis today . - Midodrine 10 mg TID for hypotension, 30 minutes prior on dialysis day 
-Next dialysis on Wednesday - Intake and Output recording - Dose all meds for ESRD # Anemia- Anemia of ESRD, Hemoglobin is below target range - Retacrit during HD #Hemodynamics-  Intermittent episodes of Hypotension , Bp as low as 94/56 mmg, mostly systolci 100 mmhg  
- continue Midodrine 10 mg TID # BMD- corrected calcium is close to normal  
Increase calcium bath for hypocalcemia PTH is suppressed Phos was 3.5 on 3/13 Subjective/Interval Events/ROS   
- No acute overnight events - Intermittent borderline low BP ,  
- He is in 2 lt/min oxygen , saturation 100 5 , as per nurse he is comfortably breathing No intake or output data in the 24 hours ending 21 1415 Blood pressure (!) 94/56, pulse 87, temperature 98.2 °F (36.8 °C), resp. rate 16, height 5' (1.524 m), weight 58.5 kg (129 lb), SpO2 100 %. Exam: patient not examined physically due to covid-19 infection and respiratory isolation , discussed with nurse Pt awake and alert On 2 lt/min oxygen Lying flat in bed No edema BMP:  
Lab Results Component Value Date/Time   2021 06:45 AM  
 K 4.3 2021 06:45 AM  
  2021 06:45 AM  
 CO2 24 2021 06:45 AM  
 AGAP 8 2021 06:45 AM  
  (H) 2021 06:45 AM  
 BUN 31 (H) 2021 06:45 AM  
 CREA 4.31 (H) 03/16/2021 06:45 AM  
 GFRAA 17 (L) 03/16/2021 06:45 AM  
 GFRNA 14 (L) 03/16/2021 06:45 AM  
 
 
Recent Results (from the past 24 hour(s)) GLUCOSE, POC Collection Time: 03/15/21  5:29 PM  
Result Value Ref Range Glucose (POC) 156 (H) 70 - 110 mg/dL GLUCOSE, POC Collection Time: 03/15/21  9:28 PM  
Result Value Ref Range Glucose (POC) 143 (H) 70 - 110 mg/dL SARS-COV-2 Collection Time: 03/15/21 11:25 PM  
Result Value Ref Range SARS-CoV-2 Please find results under separate order GLUCOSE, POC Collection Time: 03/16/21  5:52 AM  
Result Value Ref Range Glucose (POC) 211 (H) 70 - 110 mg/dL METABOLIC PANEL, COMPREHENSIVE Collection Time: 03/16/21  6:45 AM  
Result Value Ref Range Sodium 137 136 - 145 mmol/L Potassium 4.3 3.5 - 5.5 mmol/L Chloride 105 100 - 111 mmol/L  
 CO2 24 21 - 32 mmol/L Anion gap 8 3.0 - 18 mmol/L Glucose 199 (H) 74 - 99 mg/dL BUN 31 (H) 7.0 - 18 MG/DL Creatinine 4.31 (H) 0.6 - 1.3 MG/DL  
 BUN/Creatinine ratio 7 (L) 12 - 20 GFR est AA 17 (L) >60 ml/min/1.73m2 GFR est non-AA 14 (L) >60 ml/min/1.73m2 Calcium 7.6 (L) 8.5 - 10.1 MG/DL Bilirubin, total 0.6 0.2 - 1.0 MG/DL  
 ALT (SGPT) 52 16 - 61 U/L  
 AST (SGOT) 47 (H) 10 - 38 U/L Alk. phosphatase 472 (H) 45 - 117 U/L Protein, total 6.5 6.4 - 8.2 g/dL Albumin 2.6 (L) 3.4 - 5.0 g/dL Globulin 3.9 2.0 - 4.0 g/dL A-G Ratio 0.7 (L) 0.8 - 1.7 GLUCOSE, POC Collection Time: 03/16/21 11:44 AM  
Result Value Ref Range Glucose (POC) 233 (H) 70 - 110 mg/dL Thank you very much for consult. I will continue to follow Sd Allen MD 
Nephrology -[facility], [facility].

## 2021-03-16 NOTE — PROGRESS NOTES
2050-alert and oriented x 4. Lungs CTA and diminished on 2LNC. BS active x 4 quads. IV access to left AC cleaned and dressing changed, flushing without difficulty. IV access to right upper arm patent. On tele monitor 38 showing NSR. Denies pain. Existing amputation to right leg (AKA), left leg (BKA), right arm amputated above wrist, no right hand, and left hand with pinky amputated and tip of index finger amputated. Wound to right buttock with dressing in place. Left chest with dual TDC, used today by dialysis nurse without difficulty. Shift summary- continues on bedrest. Rested with no complaints of pain through the noc.

## 2021-03-16 NOTE — PROGRESS NOTES
Problem: Pressure Injury - Risk of 
Goal: *Prevention of pressure injury Description: Document Lake Scale and appropriate interventions in the flowsheet. Outcome: Progressing Towards Goal 
Note: Pressure Injury Interventions: 
  
 
Moisture Interventions: Apply protective barrier, creams and emollients Activity Interventions: Pressure redistribution bed/mattress(bed type) Mobility Interventions: HOB 30 degrees or less Nutrition Interventions: Document food/fluid/supplement intake, Offer support with meals,snacks and hydration Friction and Shear Interventions: Apply protective barrier, creams and emollients Problem: Falls - Risk of 
Goal: *Absence of Falls Description: Document   Fall Risk and appropriate interventions in the flowsheet. Outcome: Progressing Towards Goal 
Note: Fall Risk Interventions: 
  
 
  
 
Medication Interventions: Evaluate medications/consider consulting pharmacy Elimination Interventions: Call light in reach Problem: Diabetes Self-Management Goal: *Disease process and treatment process Description: Define diabetes and identify own type of diabetes; list 3 options for treating diabetes. Outcome: Progressing Towards Goal 
Goal: *Incorporating nutritional management into lifestyle Description: Describe effect of type, amount and timing of food on blood glucose; list 3 methods for planning meals. Outcome: Progressing Towards Goal 
Goal: *Incorporating physical activity into lifestyle Description: State effect of exercise on blood glucose levels. Outcome: Progressing Towards Goal 
Goal: *Developing strategies to promote health/change behavior Description: Define the ABC's of diabetes; identify appropriate screenings, schedule and personal plan for screenings. Outcome: Progressing Towards Goal 
Goal: *Using medications safely Description: State effect of diabetes medications on diabetes; name diabetes medication taking, action and side effects. Outcome: Progressing Towards Goal 
Goal: *Monitoring blood glucose, interpreting and using results Description: Identify recommended blood glucose targets  and personal targets. Outcome: Progressing Towards Goal 
Goal: *Prevention, detection, treatment of acute complications Description: List symptoms of hyper- and hypoglycemia; describe how to treat low blood sugar and actions for lowering  high blood glucose level. Outcome: Progressing Towards Goal 
Goal: *Prevention, detection and treatment of chronic complications Description: Define the natural course of diabetes and describe the relationship of blood glucose levels to long term complications of diabetes. Outcome: Progressing Towards Goal 
Goal: *Developing strategies to address psychosocial issues Description: Describe feelings about living with diabetes; identify support needed and support network Outcome: Progressing Towards Goal

## 2021-03-16 NOTE — CONSULTS
Palliative Medicine Consult Patient Name: Kelli Serra YOB: 1960 Date of Initial Consult: March 15, 2021 Reason for Consult: Goals of care discussions Requesting Provider: Dr. Chi Lujan 
Primary Care Physician: Subha, MD Christiano 
  
 SUMMARY:  
Kelli Serra is a 64 y.o. 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 Current medical issues leading to Palliative Medicine involvement include: Support and goals of care discussions in someone with multiple comorbidities. PALLIATIVE DIAGNOSES:  
1. Goals of care discussions 2. End-stage renal disease on hemodialysis 3. COVID-19 
4. Acute respiratory failure 5. PAD, severe 6. Debility PLAN:  
1. Goals of care discussions: Palliative medicine team including Jyoti Johnson RN and I met with patient at patient's bedside. Patient is awake alert and oriented x4, getting bedside dialysis. Introduced the role of palliative medicine and support offered as patient shares that he has lived at [address] for 2 years. Patient came in with hypoxia related to COVID-19, and states that his breathing has significantly improved. Patient shares he was also infected in November 2020, and has both of his Covid vaccines. Patient is not sure if he is ever completed and AMD in the past but is interested in completing today. He named his mother Munira Sabillon as primary medical power of  and sister Shabbir Coffey as secondary medical power of .   Discussed the benefits and burdens of CPR in the event of cardiopulmonary arrest in the setting of end-stage renal disease coronary artery disease, multiple amputations with debility, CHF, and other chronic comorbidities. At this time patient wishes to remain full code with full interventions. We will continue to follow for support. Discussed with patient's mother Korina Taylor, who is in agreement with medical plan. We emailed her a copy of the completed AMD. 2. End-stage renal disease on hemodialysis: Patient states dialysis is going well, he is on the Monday Wednesday Friday schedule. Nephrology is following. He is on midodrine 10 mg 3 times daily for hypotension. Anemia associated with chronic disease, gets Retacrit during HD. 3. COVID-19: rapid test positive on 3/12/2021. Associated hypoxia, which has resolved. On steroids and antioxidants, waiting on convalescent plasma. He is not a candidate for remdesivir due to renal failure. 4. Acute respiratory failure: Admitted with hypoxia, resolved now on room air. 5. PAD, severe: History of amputation x3. 
6. Debility: Lives at [address], needs assistance with all ADLs, but able to feed self. He shares his frustration with having to live in a nursing facility which she has been there for 2 years. He understands his physical limitations but does strive to be as independent as possible. 7. Initial consult note routed to primary continuity provider 8. Communicated plan of care with: Palliative IDT 
 
 
 GOALS OF CARE / TREATMENT PREFERENCES:  
[====Goals of Care====] GOALS OF CARE: Full code with full interventions Patient/Health Care Proxy Stated Goals: Prolong life TREATMENT PREFERENCES:  
Code Status: Full Code Advance Care Planning: No flowsheet data found. Medical Interventions: Full interventions The palliative care team has discussed with patient / health care proxy about goals of care / treatment preferences for patient. 
[====Goals of Care====] HISTORY:  
 
History obtained from: CHIEF COMPLAINT: COVID-19 positive, came in for hypoxia, stable symptoms are improving HPI/SUBJECTIVE: The patient is:  
[x] Verbal and participatory [] Non-participatory due to:  
Oriented X4 Clinical Pain Assessment (nonverbal scale for severity on nonverbal patients):  
Clinical Pain Assessment Severity: 0 FUNCTIONAL ASSESSMENT:  
 
Palliative Performance Scale (PPS): PPS: 40 PSYCHOSOCIAL/SPIRITUAL SCREENING:  
 
Advance Care Planning: No flowsheet data found. Any spiritual / Buddhist concerns: 
[] Yes /  [x] No 
 
Caregiver Burnout: 
[] Yes /  [] No /  [x] No Caregiver Present Anticipatory grief assessment:  
[] Normal  / [x] Maladaptive REVIEW OF SYSTEMS:  
 
Positive and pertinent negative findings in ROS are noted above in HPI. The following systems were [x] reviewed / [] unable to be reviewed as noted in HPI Other findings are noted below. Systems: constitutional, ears/nose/mouth/throat, respiratory, gastrointestinal, genitourinary, musculoskeletal, integumentary, neurologic, psychiatric, endocrine. Positive findings noted below. Modified ESAS Completed by: provider Fatigue: 3 Pain: 0 Anxiety: 0 Nausea: 0 Dyspnea: 0 Constipation: No  
  Stool Occurrence(s): 1 PHYSICAL EXAM:  
 
From RN flowsheet: 
Wt Readings from Last 3 Encounters:  
03/15/21 58.5 kg (129 lb) Blood pressure 105/62, pulse 81, temperature 98.1 °F (36.7 °C), resp. rate 16, height 5' (1.524 m), weight 58.5 kg (129 lb), SpO2 100 %. Pain Scale 1: Numeric (0 - 10) Pain Intensity 1: 0 Pain Location 1: (sacrum) Pain Orientation 1: Mid, Left Pain Description 1: Aching Pain Intervention(s) 1: Repositioned Constitutional: Lying in bed, appears stated age, no acute distress, appears chronically ill Eyes: pupils equal, anicteric ENMT: no nasal discharge, moist mucous membranes Cardiovascular: regular rhythm, distal pulses intact Respiratory: breathing not labored, symmetric Gastrointestinal: soft non-tender Musculoskeletal: BKA left AKA, amputation of digit on left hand, no tenderness to palpation Skin: warm, dry Neurologic: following commands, moving all extremities, oriented x4 Psychiatric: full affect, no hallucinations HISTORY:  
 
Principal Problem: Hypoxia (3/11/2021) Active Problems: 
  Pleural effusion on left (3/11/2021) DM (diabetes mellitus), type 2 with renal complications ([address] (4/39/3260) ESRD (end stage renal disease) on dialysis ([address] (3/11/2021) CAD (coronary artery disease) (3/12/2021) Severe peripheral arterial disease ([address] (3/12/2021) Amputee, lower limb ([address] (3/12/2021) Amputee, upper limb, right (3/12/2021) CHF (congestive heart failure) ([address] (3/12/2021) Thrombocytopenia ([address] (3/12/2021) Decubitus ulcer of left buttock, stage 2 ([address] (3/12/2021) Fever (3/12/2021) Pneumonia due to 2019 novel coronavirus (3/12/2021) Past Medical History:  
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  
Procedure Laterality Date  HX ORTHOPAEDIC    
 triple amputee  [place] CARDIAC SURG PROCEDURE UNLIST  VASCULAR SURGERY PROCEDURE UNLIST Family History Problem Relation Age of Onset  Dementia Mother  Hypertension Mother  Diabetes Father  Hypertension Father  Diabetes Sister History reviewed, no pertinent family history. Social History Tobacco Use  Smoking status: Never Smoker Substance Use Topics  Alcohol use: Not Currently Allergies Allergen Reactions  Benadryl Extra Strength [Diphenhydramine-Zinc Acetate] Unable to Obtain  Gentamicin Unable to Obtain Current Facility-Administered Medications Medication Dose Route Frequency  [Held by provider] aspirin chewable tablet 81 mg  81 mg Oral DAILY  collagenase (SANTYL) 250 unit/gram ointment   Topical DAILY  docusate sodium (COLACE) capsule 100 mg  100 mg Oral DAILY  polyvinyl alcohol-povidon(PF) (REFRESH CLASSIC) 1.4-0.6 % ophthalmic solution 1 Drop  1 Drop Both Eyes PRN  
 midodrine (PROAMATINE) tablet 10 mg  10 mg Oral TID WITH MEALS  traZODone (DESYREL) tablet 50 mg  50 mg Oral QHS  diclofenac (VOLTAREN) 1 % topical gel 2 g  2 g Topical DAILY PRN  
 HYDROcodone-acetaminophen (NORCO) 5-325 mg per tablet 1 Tab  1 Tab Oral Q8H PRN  
 fluticasone propionate (FLONASE) 50 mcg/actuation nasal spray 2 Spray  2 Spray Both Nostrils DAILY  vit B Cmplx 3-FA-Vit C-Biotin (NEPHRO MIRACLE RX) tablet 1 Tab  1 Tab Oral DAILY  epoetin toribio-epbx (RETACRIT) injection 6,000 Units  6,000 Units SubCUTAneous Q MON, WED & FRI  cefTRIAXone (ROCEPHIN) 1 g in sterile water (preservative free) 10 mL IV syringe  1 g IntraVENous Q24H  
 famotidine (PEPCID) tablet 10 mg  10 mg Oral QPM  
 acetaminophen (TYLENOL) tablet 650 mg  650 mg Oral Q6H PRN  Or  
 acetaminophen (TYLENOL) suppository 650 mg  650 mg Rectal Q6H PRN  
 guaiFENesin-dextromethorphan (ROBITUSSIN DM) 100-10 mg/5 mL syrup 5 mL  5 mL Oral Q4H PRN  
 dexamethasone (DECADRON) 4 mg/mL injection 6 mg 6 mg IntraVENous Q24H  
 0.9% sodium chloride infusion 250 mL  250 mL IntraVENous PRN  
 melatonin (rapid dissolve) tablet 5 mg  5 mg Oral QHS  albumin human 25% (BUMINATE) solution 25 g  25 g IntraVENous DIALYSIS PRN  
 sodium chloride (NS) flush 5-10 mL  5-10 mL IntraVENous PRN  
 azithromycin (ZITHROMAX) 500 mg in 0.9% sodium chloride 250 mL (VIAL-MATE)  500 mg IntraVENous Q24H  
 sodium chloride (NS) flush 5-40 mL  5-40 mL IntraVENous Q8H  
 sodium chloride (NS) flush 5-40 mL  5-40 mL IntraVENous PRN  polyethylene glycol (MIRALAX) packet 17 g  17 g Oral DAILY PRN  promethazine (PHENERGAN) tablet 12.5 mg  12.5 mg Oral Q6H PRN Or  
 ondansetron (ZOFRAN) injection 4 mg  4 mg IntraVENous Q6H PRN  
 insulin lispro (HUMALOG) injection   SubCUTAneous AC&HS  
 glucose chewable tablet 16 g  16 g Oral PRN  
 glucagon (GLUCAGEN) injection 1 mg  1 mg IntraMUSCular PRN  
 dextrose 10% infusion 125-250 mL  125-250 mL IntraVENous PRN  
 
 
 
 LAB AND IMAGING FINDINGS:  
 
Lab Results Component Value Date/Time WBC 2.5 (L) 03/14/2021 06:50 AM  
 HGB 9.1 (L) 03/14/2021 06:50 AM  
 PLATELET 60 (L) 16/19/1528 06:50 AM  
 
Lab Results Component Value Date/Time Sodium 137 03/16/2021 06:45 AM  
 Potassium 4.3 03/16/2021 06:45 AM  
 Chloride 105 03/16/2021 06:45 AM  
 CO2 24 03/16/2021 06:45 AM  
 BUN 31 (H) 03/16/2021 06:45 AM  
 Creatinine 4.31 (H) 03/16/2021 06:45 AM  
 Calcium 7.6 (L) 03/16/2021 06:45 AM  
 Magnesium 1.7 03/11/2021 09:54 PM  
 Phosphorus 3.5 03/13/2021 02:55 AM  
  
Lab Results Component Value Date/Time Alk. phosphatase 472 (H) 03/16/2021 06:45 AM  
 Protein, total 6.5 03/16/2021 06:45 AM  
 Albumin 2.6 (L) 03/16/2021 06:45 AM  
 Globulin 3.9 03/16/2021 06:45 AM  
 
Lab Results Component Value Date/Time INR 2.0 (H) 03/13/2021 02:55 AM  
 Prothrombin time 22.4 (H) 03/13/2021 02:55 AM  
 aPTT 55.8 (H) 03/11/2021 09:54 PM  
  
Lab Results Component Value Date/Time Ferritin 1,354 (H) 03/13/2021 02:55 AM  
  
No results found for: PH, PCO2, PO2 No components found for: Kp Point Lab Results Component Value Date/Time CK 76 03/12/2021 06:30 PM  
 CK - MB 2.0 03/12/2021 06:30 PM  
  
 
 
   
 
Total time: 30 minutes Counseling / coordination time, spent as noted above: 25 minutes 
> 50% counseling / coordination?:  Yes patient and family Prolonged service was provided for  []30 min   []75 min in face to face time in the presence of the patient, spent as noted above. Time Start:  
Time End:  
Note: this can only be billed with 89302 (initial) or 24358 (follow up). If multiple start / stop times, list each separately.

## 2021-03-16 NOTE — PROGRESS NOTES
Physician Progress Note Jomar Kirkland 
CSN #:                  [id number] :                       1960 ADMIT DATE:       3/11/2021 9:42 PM 
[address] [place] DATE: 
RESPONDING 
PROVIDER #:        Elsa THOMAS MD 
 
 
 
 
QUERY TEXT: 
 
Pt admitted with pleural effusion. Pt noted to have laboratory test positive for COVID 19. If possible, please document in progress notes and discharge summary if *COVID 19  was present on admission (POA): The medical record reflects the following: 
Risk Factors: pna, hypoxia Clinical Indicators: Ct indicated pna , 3-13- ferritin 1,354, 3-12- COVID -19- rapid test. hypoxia, fever, 
Treatment: Continue steroid, anti-oxidants cocktail . Waiting for plasma Thank- You Saeed Perry RN [facility] [phone] Options provided: 
-- Yes, COVID-19 was present at the time of the order to admit to the hospital 
-- No, COVID-19 was not present on admission and developed during the inpatient stay 
-- Other - I will add my own diagnosis -- Disagree - Not applicable / Not valid -- Disagree - Clinically unable to determine / Unknown 
-- Refer to Clinical Documentation Reviewer PROVIDER RESPONSE TEXT: 
 
[id number] was present at the time of inpatient admission order.  
 
Query created by: Richie Fierro on 3/16/2021 12:34 PM 
 
 
Electronically signed by:  Elsa Murray MD 3/16/2021 2:16 PM

## 2021-03-17 NOTE — PROGRESS NOTES
Problem: Pressure Injury - Risk of 
Goal: *Prevention of pressure injury Description: Document Lake Scale and appropriate interventions in the flowsheet. Outcome: Progressing Towards Goal 
Note: Pressure Injury Interventions: 
  
 
Moisture Interventions: Apply protective barrier, creams and emollients Activity Interventions: Increase time out of bed Mobility Interventions: HOB 30 degrees or less Nutrition Interventions: Document food/fluid/supplement intake, Offer support with meals,snacks and hydration Friction and Shear Interventions: Apply protective barrier, creams and emollients Problem: Patient Education: Go to Patient Education Activity Goal: Patient/Family Education Outcome: Progressing Towards Goal 
  
Problem: Falls - Risk of 
Goal: *Absence of Falls Description: Document Laretta Trace Fall Risk and appropriate interventions in the flowsheet. Outcome: Progressing Towards Goal 
Note: Fall Risk Interventions: 
  
 
  
 
Medication Interventions: Evaluate medications/consider consulting pharmacy Elimination Interventions: Call light in reach Problem: Patient Education: Go to Patient Education Activity Goal: Patient/Family Education Outcome: Progressing Towards Goal 
  
Problem: Diabetes Self-Management Goal: *Disease process and treatment process Description: Define diabetes and identify own type of diabetes; list 3 options for treating diabetes. Outcome: Progressing Towards Goal 
Goal: *Incorporating nutritional management into lifestyle Description: Describe effect of type, amount and timing of food on blood glucose; list 3 methods for planning meals. Outcome: Progressing Towards Goal 
Goal: *Incorporating physical activity into lifestyle Description: State effect of exercise on blood glucose levels. Outcome: Progressing Towards Goal 
Goal: *Developing strategies to promote health/change behavior Description: Define the ABC's of diabetes; identify appropriate screenings, schedule and personal plan for screenings. Outcome: Progressing Towards Goal 
Goal: *Using medications safely Description: State effect of diabetes medications on diabetes; name diabetes medication taking, action and side effects. Outcome: Progressing Towards Goal 
Goal: *Monitoring blood glucose, interpreting and using results Description: Identify recommended blood glucose targets  and personal targets. Outcome: Progressing Towards Goal 
Goal: *Prevention, detection, treatment of acute complications Description: List symptoms of hyper- and hypoglycemia; describe how to treat low blood sugar and actions for lowering  high blood glucose level. Outcome: Progressing Towards Goal 
Goal: *Prevention, detection and treatment of chronic complications Description: Define the natural course of diabetes and describe the relationship of blood glucose levels to long term complications of diabetes. Outcome: Progressing Towards Goal 
Goal: *Developing strategies to address psychosocial issues Description: Describe feelings about living with diabetes; identify support needed and support network Outcome: Progressing Towards Goal

## 2021-03-17 NOTE — PROGRESS NOTES
1379 Assumed care of the patient from C. Barbra Prader RN (offgoing Nurse). 1644  PCR results negative. Spoke with DR. Issa Nicely if pt can be moved out of cohort. Received order to move him out of cohort. 1815 Pt moved to [facility] 307 Bedside and Verbal shift change report given to Jessica Hall (oncoming nurse) by Kathie Carrion RN (offgoing nurse). Report included the following information SBAR, Kardex, MAR, Recent Results and Cardiac Rhythm .

## 2021-03-17 NOTE — PROGRESS NOTES
Problem: Pressure Injury - Risk of 
Goal: *Prevention of pressure injury Description: Document Lake Scale and appropriate interventions in the flowsheet. Outcome: Progressing Towards Goal 
Note: Pressure Injury Interventions: 
  
 
Moisture Interventions: Apply protective barrier, creams and emollients Activity Interventions: Increase time out of bed, Pressure redistribution bed/mattress(bed type) Mobility Interventions: HOB 30 degrees or less Nutrition Interventions: Document food/fluid/supplement intake, Offer support with meals,snacks and hydration Friction and Shear Interventions: Apply protective barrier, creams and emollients, HOB 30 degrees or less, Lift sheet Problem: Patient Education: Go to Patient Education Activity Goal: Patient/Family Education Outcome: Progressing Towards Goal 
  
Problem: Falls - Risk of 
Goal: *Absence of Falls Description: Document Johnnneka Adriana Fall Risk and appropriate interventions in the flowsheet. Outcome: Progressing Towards Goal 
Note: Fall Risk Interventions: 
  
 
  
 
Medication Interventions: Evaluate medications/consider consulting pharmacy Elimination Interventions: Call light in reach Problem: Patient Education: Go to Patient Education Activity Goal: Patient/Family Education Outcome: Progressing Towards Goal 
  
Problem: Diabetes Self-Management Goal: *Disease process and treatment process Description: Define diabetes and identify own type of diabetes; list 3 options for treating diabetes. Outcome: Progressing Towards Goal 
Goal: *Incorporating nutritional management into lifestyle Description: Describe effect of type, amount and timing of food on blood glucose; list 3 methods for planning meals. Outcome: Progressing Towards Goal 
Goal: *Incorporating physical activity into lifestyle Description: State effect of exercise on blood glucose levels.  
Outcome: Progressing Towards Goal 
Goal: *Developing strategies to promote health/change behavior Description: Define the ABC's of diabetes; identify appropriate screenings, schedule and personal plan for screenings. Outcome: Progressing Towards Goal 
Goal: *Using medications safely Description: State effect of diabetes medications on diabetes; name diabetes medication taking, action and side effects. Outcome: Progressing Towards Goal 
Goal: *Monitoring blood glucose, interpreting and using results Description: Identify recommended blood glucose targets  and personal targets. Outcome: Progressing Towards Goal 
Goal: *Prevention, detection, treatment of acute complications Description: List symptoms of hyper- and hypoglycemia; describe how to treat low blood sugar and actions for lowering  high blood glucose level. Outcome: Progressing Towards Goal 
Goal: *Prevention, detection and treatment of chronic complications Description: Define the natural course of diabetes and describe the relationship of blood glucose levels to long term complications of diabetes. Outcome: Progressing Towards Goal 
Goal: *Developing strategies to address psychosocial issues Description: Describe feelings about living with diabetes; identify support needed and support network Outcome: Progressing Towards Goal 
Goal: *Insulin pump training Outcome: Progressing Towards Goal 
Goal: *Sick day guidelines Outcome: Progressing Towards Goal 
Goal: *Patient Specific Goal (EDIT GOAL, INSERT TEXT) Outcome: Progressing Towards Goal 
  
Problem: Patient Education: Go to Patient Education Activity Goal: Patient/Family Education Outcome: Progressing Towards Goal 
  
Problem: Chronic Renal Failure Goal: *Fluid and electrolytes stabilized Outcome: Progressing Towards Goal 
  
Problem: Patient Education: Go to Patient Education Activity Goal: Patient/Family Education Outcome: Progressing Towards Goal

## 2021-03-17 NOTE — PROGRESS NOTES
Patient:  Bertrand Meigs :  1960 Gender:  male MRN #:  [id number] Assessment:  
He is 64 yrs male with h/o ESRD on HD ( //) DM-2, severe peripheral arterial disease with 3 extremity amputations, CAD, thrombocytopenia, CHF, and CAD was  admitted with hypoxia, fever, and left sided pleural effusion. He has COVID -19 infection on azithro/ceftriaxone and dexamethasone.  
  
 
 
Plan: # ESRD on HD- // schedule. Patient seen and labs reviewed , Dialysis today for 3.5 hours and UF 1 to 1.5 liter - Midodrine 10 mg TID for hypotension, 30 minutes prior on dialysis day 
-Next dialysis on Friday - Intake and Output recording - Dose all meds for ESRD # Anemia- Anemia of ESRD, Hemoglobin is below target range - Retacrit during HD #Hemodynamics-  Intermittent episodes of Hypotension , BP as low as 94/56 mmg, mostly systolic >  732 mmhg  
- continue Midodrine 10 mg TID # BMD- corrected calcium is close to normal  
Increased calcium bath for hypocalcemia PTH is suppressed Phos was 3.5 on 3/13 Subjective/Interval Events/ROS   
- No acute overnight events - Intermittent borderline low BP ,  
- He is in 2 lt/min oxygen , saturation 100 % , as per nurse he is comfortably breathing Received convalescent plasma Intake/Output Summary (Last 24 hours) at 3/17/2021 1100 Last data filed at 3/17/2021 0692 Gross per 24 hour Intake 944 ml Output  Net 944 ml Blood pressure 114/69, pulse 97, temperature 97.2 °F (36.2 °C), resp. rate 16, height 5' (1.524 m), weight 58.5 kg (129 lb), SpO2 100 %. Exam: patient seen but not  examined physically due to covid-19 infection and respiratory isolation , discussed with nurse Pt awake and alert On 2 lt/min oxygen Lying flat in bed No edema BMP:  
Lab Results Component Value Date/Time   2021 06:59 AM  
 K 4.5 2021 06:59 AM  
  2021 06:59 AM  
 CO2 23 2021 06:59 AM  
 AGAP 9 03/17/2021 06:59 AM  
  (H) 03/17/2021 06:59 AM  
 BUN 42 (H) 03/17/2021 06:59 AM  
 CREA 5.33 (H) 03/17/2021 06:59 AM  
 GFRAA 13 (L) 03/17/2021 06:59 AM  
 GFRNA 11 (L) 03/17/2021 06:59 AM  
 
 
Recent Results (from the past 24 hour(s)) GLUCOSE, POC Collection Time: 03/16/21 11:44 AM  
Result Value Ref Range Glucose (POC) 233 (H) 70 - 110 mg/dL GLUCOSE, POC Collection Time: 03/16/21  4:43 PM  
Result Value Ref Range Glucose (POC) 128 (H) 70 - 110 mg/dL CONVALESCENT PLASMA, ALLOCATE Collection Time: 03/16/21  6:00 PM  
Result Value Ref Range CALLED TO: Davion Sorensen 3N [id number] AT [id number] Unit number [id number] Blood component type ConvPls,   
 Unit division 00 Status of unit ISSUED   
GLUCOSE, POC Collection Time: 03/17/21  6:53 AM  
Result Value Ref Range Glucose (POC) 196 (H) 70 - 110 mg/dL METABOLIC PANEL, COMPREHENSIVE Collection Time: 03/17/21  6:59 AM  
Result Value Ref Range Sodium 137 136 - 145 mmol/L Potassium 4.5 3.5 - 5.5 mmol/L Chloride 105 100 - 111 mmol/L  
 CO2 23 21 - 32 mmol/L Anion gap 9 3.0 - 18 mmol/L Glucose 217 (H) 74 - 99 mg/dL BUN 42 (H) 7.0 - 18 MG/DL Creatinine 5.33 (H) 0.6 - 1.3 MG/DL  
 BUN/Creatinine ratio 8 (L) 12 - 20 GFR est AA 13 (L) >60 ml/min/1.73m2 GFR est non-AA 11 (L) >60 ml/min/1.73m2 Calcium 7.0 (L) 8.5 - 10.1 MG/DL Bilirubin, total 0.5 0.2 - 1.0 MG/DL  
 ALT (SGPT) 53 16 - 61 U/L  
 AST (SGOT) 36 10 - 38 U/L Alk. phosphatase 412 (H) 45 - 117 U/L Protein, total 6.0 (L) 6.4 - 8.2 g/dL Albumin 2.5 (L) 3.4 - 5.0 g/dL Globulin 3.5 2.0 - 4.0 g/dL A-G Ratio 0.7 (L) 0.8 - 1.7 Thank you very much for consult. I will continue to follow Maciel Stinson MD 
Nephrology -[facility], [facility].

## 2021-03-17 NOTE — PROGRESS NOTES
1940 - Bedside report received from [address]. Patient in bed. Pain 0/10.  
 
2158-Bedside and Verbal shift change report given to Bridger Brian RN by Dash Ontiveros. Report included the following information SBAR, Kardex, OR Summary, Intake/Output and MAR.

## 2021-03-17 NOTE — PROGRESS NOTES
DC Plan: return to SNF, disposition pending PCR results Patient has been a resident at [facility];  Rapid covid positive 3/12; PCR pending with results expected today. Once results are back, care manager will review potential disposition locations pending patient being medically cleared for discharge. 1600 - PCR results are still pending; per [facility], patient has been fully vaccinated with covid vaccine and are questioning if rapid positive is accurate of covid status. Will update as more informaiton is available. 1700 - PCR results are negative; have reached out via [facility]api to [facility] to see if they will accept patient back tomorrow.

## 2021-03-17 NOTE — PROGRESS NOTES
Hospitalist Progress Note-critical care note Patient: Alistair Bangura MRN: [id number]  CSN: [id number] YOB: 1960  Age: 64 y.o. Sex: male DOA: 3/11/2021 LOS:  LOS: 6 days Chief complaint: fever, chf, decubitus ulcer ,  thrombocytopenia , chf , esrd on hd pleural effusion Assessment/Plan Hospital Problems  Date Reviewed: 3/11/2021 Codes Class Noted POA  
 CAD (coronary artery disease) ICD-10-CM: I25.10 ICD-9-CM: 414.00  3/12/2021 Yes Severe peripheral arterial disease (HCC) ICD-10-CM: I73.9 ICD-9-CM: 443.9  3/12/2021 Yes Amputee, [facility] limb ([facility] ICD-10-CM: L79.336 ICD-9-CM: V49.70  3/12/2021 Yes Amputee, upper limb, right ICD-10-CM: Z89.201 ICD-9-CM: V49.60  3/12/2021 Yes  
   
 CHF (congestive heart failure) (HCC) ICD-10-CM: I50.9 ICD-9-CM: 428.0  3/12/2021 Yes Thrombocytopenia ([facility] ICD-10-CM: D69.6 ICD-9-CM: 287.5  3/12/2021 Yes Decubitus ulcer of left buttock, stage 2 ([facility] ICD-10-CM: J28.147 ICD-9-CM: 707.05, 707.22  3/12/2021 Yes Fever ICD-10-CM: R50.9 ICD-9-CM: 780.60  3/12/2021 Yes Pneumonia due to 2019 novel coronavirus ICD-10-CM: U07.1, J12.82 ICD-9-CM: 480.8  3/12/2021 * (Principal) Hypoxia ICD-10-CM: R09.02 
ICD-9-CM: 799.02  3/11/2021 Yes Pleural effusion on left ICD-10-CM: J90 ICD-9-CM: 511.9  3/11/2021 Yes DM (diabetes mellitus), type 2 with renal complications (HCC) PHV-87-WF: E11.29 ICD-9-CM: 250.40  3/11/2021 Yes ESRD (end stage renal disease) on dialysis ([facility] ICD-10-CM: N18.6, Z99.2 ICD-9-CM: 585.6, V45.11  3/11/2021 Yes 61-year-old male with type 2 diabetes mellitus with end-stage renal disease on hemodialysis, severe peripheral arterial disease with 3 extremity amputations, CAD, thrombocytopenia, CHF, and CAD is admitted with hypoxia, fever, and left sided pleural effusion.  covid 19 pna is ruled in-but still waiting for pcr results for confirm  
  
Hypoxialike fluid overloaded and pna-covid 19 Resolved  
 
pna due to covid 19 -rapid positive , pcr one still pending at this moment Continue steroid, anti-oxidants cocktail Received plasma yesterday   
check pcr covid 19 one -still pending 
 
  
Pleural effusion on left with layering Ct indicated pna  
 
ESRD on hemodialysis with left chest wall TDC in place 
dialysis Monday/Wednesday/Friday at the [facility]. HD per renal  
  
CHFEF was 55%  
ce wnl Echo ef wnl  
  
Pancytopenia So far no bleeding noted Abdomen ultrasound Decubitus ulcer of the left buttock, stage II 
Wound care consult 
  
Type 2 diabetes mellitus Sliding scale insulin 
  
CAD Continue home medications 
  
Severe peripheral arterial disease with triple amputation Fall precaution Subjective : feel better, feel fine Pt was seen and examed in full PPE Palliative care on board 35 total min's spent on patient care including >50% on counseling/coordinating care. Disposition :planning Thursday Review of systems: 
 
General: No fevers or chills. Cardiovascular: No chest pain or pressure. No palpitations. Pulmonary: No shortness of breath. Gastrointestinal: No nausea, vomiting. Vital signs/Intake and Output: 
Visit Vitals /69 Pulse 97 Temp 97.2 °F (36.2 °C) Resp 16 Ht 5' (1.524 m) Wt 58.5 kg (129 lb) SpO2 100% BMI 25.19 kg/m² Current Shift:  No intake/output data recorded. Last three shifts:  03/15 1901 - 03/17 0700 In: 1064 [P.O.:360; I.V.:520] Out: - Physical Exam: 
General: WD, WN. Alert, cooperative, no acute distress HEENT: NC, Atraumatic. PERRLA, anicteric sclerae. Lungs: CTA Bilaterally. No Wheezing/Rhonchi/Rales. tdc site is good skin no sign of infection Heart:  Regular  rhythm,  + murmur, No Rubs, No Gallops Abdomen: Soft, Non distended, Non tender. +Bowel sounds, Extremities: No c/c, rt bka, left aka , rt hand amputated, left 5th finger amputated Psych:   Not anxious or agitated. Neurologic:  No acute neurological deficit. Labs: Results:  
   
Chemistry Recent Labs  
  03/17/21 
9157 03/16/21 
0645 03/15/21 
7145 * 199* 139*  137 138  
K 4.5 4.3 4.2  105 106 CO2 23 24 22 BUN 42* 31* 52* CREA 5.33* 4.31* 6.15* CA 7.0* 7.6* 6.3* AGAP 9 8 10 BUCR 8* 7* 8* * 472* 462* TP 6.0* 6.5 6.1* ALB 2.5* 2.6* 2.2*  
GLOB 3.5 3.9 3.9 AGRAT 0.7* 0.7* 0.6* CBC w/Diff No results for input(s): WBC, RBC, HGB, HCT, PLT, GRANS, LYMPH, EOS, HGBEXT, HCTEXT, PLTEXT, HGBEXT, HCTEXT, PLTEXT in the last 72 hours. Cardiac Enzymes No results for input(s): CPK, CKND1, OPAL in the last 72 hours. No lab exists for component: Josey Jhonathan Coagulation No results for input(s): PTP, INR, APTT, INREXT, INREXT in the last 72 hours. Lipid Panel No results found for: CHOL, CHOLPOCT, CHOLX, CHLST, CHOLV, 593306, HDL, HDLP, LDL, LDLC, DLDLP, 430793, VLDLC, VLDL, TGLX, TRIGL, TRIGP, TGLPOCT, CHHD, CHHDX  
BNP No results for input(s): BNPP in the last 72 hours. Liver Enzymes Recent Labs  
  03/17/21 
0634 TP 6.0* ALB 2.5* * Thyroid Studies No results found for: T4, T3U, TSH, TSHEXT, TSHEXT Procedures/imaging: see electronic medical records for all procedures/Xrays and details which were not copied into this note but were reviewed prior to creation of Plan Xr Chest [facility] Result Date: 3/11/2021 EXAM: XR CHEST PORT CLINICAL INDICATION/HISTORY: meets SIRS criteria -Additional: None COMPARISON: None TECHNIQUE: Portable frontal view of the chest _______________ FINDINGS: SUPPORT DEVICES: Left chest wall tunneled dialysis catheter tip at the cavoatrial junction. HEART AND MEDIASTINUM: Prior median sternotomy and CABG. Cardiomediastinal silhouette within normal limits. LUNGS AND PLEURAL SPACES: Small layering left effusion. No dense consolidation or pneumothorax. _______________ Small layering left effusion.   
 
 
Atiya Salmon MD

## 2021-03-18 PROBLEM — G93.40 ENCEPHALOPATHY ACUTE: Status: ACTIVE | Noted: 2021-01-01

## 2021-03-18 PROBLEM — G93.49 ENCEPHALOPATHY DUE TO STRUCTURAL DISORDER OF BRAIN: Status: ACTIVE | Noted: 2021-01-01

## 2021-03-18 NOTE — PROGRESS NOTES
0730: Assumed patient care from off going nurse Delmi Lisa. RN  
 
3316: Shift assessment completed,  at bedside patient confused at this time orientated to self only. Patient not alert enough to take oral medication at this time. 1200: This nurse spoke with , Dr. Serene Connors stated to hold off on PO due to patients confusion.

## 2021-03-18 NOTE — ROUTINE PROCESS
Bedside and Verbal shift change report given to Yesy Roberts RN (oncoming nurse) by Eli Givens RN (offgoing nurse). Report included the following information SBAR, Kardex, MAR, Recent Results and Cardiac Rhythm .

## 2021-03-18 NOTE — PROGRESS NOTES
Patient acting alittle confused, BS finger stick 66  Treated with apple juice. Reoriented to surroundings and repositioned in bed.

## 2021-03-18 NOTE — PROGRESS NOTES
Comprehensive Nutrition Assessment Type and Reason for Visit: (P) Reassess, Positive nutrition screen Nutrition Recommendations/Plan: Other: continue w/ POC Nutrition Assessment:  (P) pt admitted w/ hypoxia, fever, pleural effusion, COVID 19- rapid positive- ID on board , ESRD on HD, AMS, anemia, pancytopenia Estimated Daily Nutrient Needs: 
Energy (kcal): (P) 1491; Weight Used for Energy Requirements: (P) Ideal 
Protein (g): (P) 45; Weight Used for Protein Requirements: (P) Ideal 
Fluid (ml/day): (P) 1491; Method Used for Fluid Requirements: (P) 1 ml/kcal 
 
 
Nutrition Related Findings:  (P) BUN-50 GFR est AA-11 Meds: colace, pepcid, humalog, zofran, miralax, nephro [place] rx +BM 3/15/21; poor intake noted in nursing documentation Wounds: (P) Pressure injury, Stage II    
 
Current Nutrition Therapies: DIET DIABETIC CONSISTENT CARB Regular DIET NUTRITIONAL SUPPLEMENTS All Meals; [place] DIET NUTRITIONAL SUPPLEMENTS Dinner, Lunch; Emir Anthropometric Measures: 
Height:  (P) 5' (152.4 cm) Current Body Wt:  (P) 58.5 kg (129 lb) Admission Body Wt:  (P) 134 lb Usual Body Wt:  69.4 kg (153 lb)(3/8/19) Ideal Body Wt:  (P) 106 lbs:  (P) 121.7 % Adjusted Body Weight:  (P) 147.2; Weight Adjustment for: (P) Amputation Adjusted BMI:  (P) 28.8 BMI Category:  (P) Overweight (BMI 25.0-29. 9) Nutrition Diagnosis:  
Inadequate oral intake related to impaired respiratory function as evidenced by intake 0-25% Nutrition Interventions:  
Food and/or Nutrient Delivery: (P) Continue current diet, Continue oral nutrition supplement Nutrition Education and Counseling: (P) No recommendations at this time Coordination of Nutrition Care: (P) Continue to monitor while inpatient, Interdisciplinary rounds Goals: (P) Encourage PO intake >50% at most meals in the next 3-5days Nutrition Monitoring and Evaluation:  
Behavioral-Environmental Outcomes:   
Food/Nutrient Intake Outcomes: (P) Diet advancement/tolerance, Food and nutrient intake, Supplement intake Physical Signs/Symptoms Outcomes: (P) Biochemical data, Skin, Weight, GI status, Hemodynamic status Discharge Planning: (P) Continue oral nutrition supplement, Continue current diet Electronically signed by Aidee Reynolds on 3/18/2021 at 12:34 PM

## 2021-03-18 NOTE — PROGRESS NOTES
Problem: Pressure Injury - Risk of 
Goal: *Prevention of pressure injury Description: Document Lake Scale and appropriate interventions in the flowsheet. Outcome: Progressing Towards Goal 
Note: Pressure Injury Interventions: 
  
 
Moisture Interventions: Apply protective barrier, creams and emollients Activity Interventions: Increase time out of bed Mobility Interventions: HOB 30 degrees or less Nutrition Interventions: Document food/fluid/supplement intake, Offer support with meals,snacks and hydration Friction and Shear Interventions: Apply protective barrier, creams and emollients Problem: Patient Education: Go to Patient Education Activity Goal: Patient/Family Education Outcome: Progressing Towards Goal 
  
Problem: Falls - Risk of 
Goal: *Absence of Falls Description: Document [place] Flow Fall Risk and appropriate interventions in the flowsheet. Outcome: Progressing Towards Goal 
Note: Fall Risk Interventions: 
  
 
  
 
Medication Interventions: Evaluate medications/consider consulting pharmacy Elimination Interventions: Call light in reach Problem: Patient Education: Go to Patient Education Activity Goal: Patient/Family Education Outcome: Progressing Towards Goal 
  
Problem: Diabetes Self-Management Goal: *Disease process and treatment process Description: Define diabetes and identify own type of diabetes; list 3 options for treating diabetes. Outcome: Progressing Towards Goal 
Goal: *Incorporating nutritional management into lifestyle Description: Describe effect of type, amount and timing of food on blood glucose; list 3 methods for planning meals. Outcome: Progressing Towards Goal 
Goal: *Incorporating physical activity into lifestyle Description: State effect of exercise on blood glucose levels. Outcome: Progressing Towards Goal 
Goal: *Developing strategies to promote health/change behavior Description: Define the ABC's of diabetes; identify appropriate screenings, schedule and personal plan for screenings. Outcome: Progressing Towards Goal 
Goal: *Using medications safely Description: State effect of diabetes medications on diabetes; name diabetes medication taking, action and side effects. Outcome: Progressing Towards Goal 
Goal: *Monitoring blood glucose, interpreting and using results Description: Identify recommended blood glucose targets  and personal targets. Outcome: Progressing Towards Goal 
Goal: *Prevention, detection, treatment of acute complications Description: List symptoms of hyper- and hypoglycemia; describe how to treat low blood sugar and actions for lowering  high blood glucose level. Outcome: Progressing Towards Goal 
Goal: *Prevention, detection and treatment of chronic complications Description: Define the natural course of diabetes and describe the relationship of blood glucose levels to long term complications of diabetes. Outcome: Progressing Towards Goal 
Goal: *Developing strategies to address psychosocial issues Description: Describe feelings about living with diabetes; identify support needed and support network Outcome: Progressing Towards Goal 
Goal: *Insulin pump training Outcome: Progressing Towards Goal 
Goal: *Sick day guidelines Outcome: Progressing Towards Goal 
Goal: *Patient Specific Goal (EDIT GOAL, INSERT TEXT) Outcome: Progressing Towards Goal 
  
Problem: Patient Education: Go to Patient Education Activity Goal: Patient/Family Education Outcome: Progressing Towards Goal 
  
Problem: Chronic Renal Failure Goal: *Fluid and electrolytes stabilized Outcome: Progressing Towards Goal 
  
Problem: Patient Education: Go to Patient Education Activity Goal: Patient/Family Education Outcome: Progressing Towards Goal

## 2021-03-18 NOTE — PROGRESS NOTES
Hospitalist Progress Note-critical care note Patient: Marlon Green MRN: [id number]  [facility]: [id number] YOB: 1960  Age: 64 y.o. Sex: male DOA: 3/11/2021 LOS:  LOS: 7 days Chief complaint: fever, chf, decubitus ulcer ,  thrombocytopenia , chf , esrd on hd pleural effusion Assessment/Plan Hospital Problems  Date Reviewed: 3/11/2021 Codes Class Noted POA * (Principal) Encephalopathy acute ICD-10-CM: G93.40 ICD-9-CM: 348.30  3/18/2021 CAD (coronary artery disease) ICD-10-CM: I25.10 ICD-9-CM: 414.00  3/12/2021 Yes Severe peripheral arterial disease (HCC) ICD-10-CM: I73.9 ICD-9-CM: 443.9  3/12/2021 Yes Amputee, lower limb ([facility] ICD-10-CM: G50.405 ICD-9-CM: V49.70  3/12/2021 Yes Amputee, upper limb, right ICD-10-CM: Z89.201 ICD-9-CM: V49.60  3/12/2021 Yes  
   
 CHF (congestive heart failure) (HCC) ICD-10-CM: I50.9 ICD-9-CM: 428.0  3/12/2021 Yes Thrombocytopenia ([facility] ICD-10-CM: D69.6 ICD-9-CM: 287.5  3/12/2021 Yes Decubitus ulcer of left buttock, stage 2 ([facility] ICD-10-CM: R31.298 ICD-9-CM: 707.05, 707.22  3/12/2021 Yes Fever ICD-10-CM: R50.9 ICD-9-CM: 780.60  3/12/2021 Yes Pneumonia due to 2019 novel coronavirus ICD-10-CM: U07.1, J12.82 ICD-9-CM: 480.8  3/12/2021 Hypoxia ICD-10-CM: R09.02 
ICD-9-CM: 799.02  3/11/2021 Yes Pleural effusion on left ICD-10-CM: J90 ICD-9-CM: 511.9  3/11/2021 Yes DM (diabetes mellitus), type 2 with renal complications (HCC) CTZ-93-VH: E11.29 ICD-9-CM: 250.40  3/11/2021 Yes ESRD (end stage renal disease) on dialysis ([facility] ICD-10-CM: N18.6, Z99.2 ICD-9-CM: 585.6, V45.11  3/11/2021 Yes 51-year-old male with type 2 diabetes mellitus with end-stage renal disease on hemodialysis, severe peripheral arterial disease with 3 extremity amputations, CAD, thrombocytopenia, CHF, and CAD is admitted with hypoxia, fever, and left sided pleural effusion. Positive covid 19 -rapid ,pcp one negative Will repeat it again Appreciated dr. Kelley Vu on board Acute encephalopathy Ct scan possible stroke-however mri brain -chronic stroke Will hold neuro consult for now Will check abg May be due to uremia Hypoglycemia AM , resolved Will have ce Will have bcx , vanc empiric tx  
  
Hypoxialike fluid overloaded Resolved  
 
  
Pleural effusion on left with layering Ct indicated pna  
 
ESRD on hemodialysis with left chest wall TDC in place 
dialysis Monday/Wednesday/Friday at the [place]. HD per renal  
Not receiving hd yesterday  
  
CHFEF was 55%  
ce wnl Echo ef wnl  
  
Pancytopenia So far no bleeding noted Abdomen ultrasound Decubitus ulcer of the left buttock, stage II 
Wound care consult 
  
Type 2 diabetes mellitus Sliding scale insulin 
  
CAD Continue home medications 
  
Severe peripheral arterial disease with triple amputation Fall precaution Subjective : feel better, feel fine Called his mother and updated pt condition change and care plan, she appreciated the call 35 total min's spent on patient care including >50% on counseling/coordinating care. Disposition :tbd Review of systems: 
limitted due to confusion Vital signs/Intake and Output: 
Visit Vitals /73 (BP 1 Location: Left arm, BP Patient Position: At rest) Pulse 96 Temp 97.5 °F (36.4 °C) Resp 15 Ht 5' (1.524 m) Wt 58.5 kg (129 lb) SpO2 99% BMI 25.19 kg/m² Current Shift:  No intake/output data recorded. Last three shifts:  03/16 1901 - 03/18 0700 In: 1064 [P.O.:360; I.V.:520] Out: - Physical Exam: 
General: WD, WN. Alert, some  cooperative, no acute distress HEENT: NC, Atraumatic. PERRLA, anicteric sclerae. Lungs: CTA Bilaterally. No Wheezing/Rhonchi/Rales. tdc site is good skin no sign of infection Heart:  Regular  rhythm,  + murmur, No Rubs, No Gallops Abdomen: Soft, Non distended, Non tender. +Bowel sounds, Extremities: No c/c, rt bka, left aka , rt hand amputated, left 5th finger amputated Psych:   Not anxious or agitated. Neurologic:  confusion Labs: Results:  
   
Chemistry Recent Labs  
  03/18/21 
1105 03/17/21 
5624 03/16/21 
0645 GLU 87 217* 199*  137 137  
K 4.3 4.5 4.3  105 105 CO2 24 23 24 BUN 50* 42* 31* CREA 6.61* 5.33* 4.31* CA 7.0* 7.0* 7.6* AGAP 7 9 8 BUCR 8* 8* 7* AP  --  412* 472* TP  --  6.0* 6.5 ALB  --  2.5* 2.6*  
GLOB  --  3.5 3.9 AGRAT  --  0.7* 0.7* CBC w/Diff Recent Labs  
  03/17/21 
0700 WBC 1.5*  
RBC 4.21* HGB 9.2* HCT 29.7*  
PLT 50* Cardiac Enzymes No results for input(s): CPK, CKND1, OPAL in the last 72 hours. No lab exists for component: [facility] Coagulation No results for input(s): PTP, INR, APTT, INREXT, INREXT in the last 72 hours. Lipid Panel No results found for: CHOL, CHOLPOCT, CHOLX, CHLST, CHOLV, 518937, HDL, HDLP, LDL, LDLC, DLDLP, 515417, VLDLC, VLDL, TGLX, TRIGL, TRIGP, TGLPOCT, CHHD, CHHDX  
BNP No results for input(s): BNPP in the last 72 hours. Liver Enzymes Recent Labs  
  03/17/21 
5956 TP 6.0* ALB 2.5* * Thyroid Studies No results found for: T4, T3U, TSH, TSHEXT, TSHEXT Procedures/imaging: see electronic medical records for all procedures/Xrays and details which were not copied into this note but were reviewed prior to creation of Plan Xr Chest [place] Result Date: 3/11/2021 EXAM: XR CHEST PORT CLINICAL INDICATION/HISTORY: meets SIRS criteria -Additional: None COMPARISON: None TECHNIQUE: Portable frontal view of the chest _______________ FINDINGS: SUPPORT DEVICES: Left chest wall tunneled dialysis catheter tip at the cavoatrial junction. HEART AND MEDIASTINUM: Prior median sternotomy and CABG. Cardiomediastinal silhouette within normal limits. LUNGS AND PLEURAL SPACES: Small layering left effusion.  No dense consolidation or pneumothorax. _______________ Small layering left effusion.   
 
 
Lan Sutton MD

## 2021-03-18 NOTE — PROGRESS NOTES
Patient:  Monda Koyanagi :  1960 Gender:  male MRN #:  [id number] Assessment:  
He is 64 yrs male with h/o ESRD on HD ( //) DM-2, severe peripheral arterial disease with 3 extremity amputations, CAD, thrombocytopenia, CHF, and CAD was  admitted with hypoxia, fever, and left sided pleural effusion. He had COVID -19 infection on azithro/ceftriaxone and dexamethasone. Latest COVID-19 test on 3/15 was negative.  
  
 
 
Plan: # ESRD on HD- // schedule. Patient seen and labs reviewed. He did not have dialysis yesterday due to shortage of nursing staff, he looks confused this morning  
- Dialysis today and again tomorrow morning in his regular schedule . CBC and BMP ordered - Midodrine 10 mg TID for hypotension, 30 minutes prior on dialysis day - Intake and Output recording - Dose all meds for ESRD # Anemia- Anemia of ESRD, Hemoglobin is below target range - Retacrit during HD #Hemodynamics-  Intermittent episodes of Hypotension , BP as low as 94/56 mmg, mostly systolic >  065 mmhg  
- continue Midodrine 10 mg TID # BMD- corrected calcium is close to normal  
Increased calcium bath for hypocalcemia PTH is suppressed Phos was 3.5 on 3/13 Subjective/Interval Events/ROS   
- He is awake but confused than yesterday - BP stable - He is in 2 lt/min oxygen. Intake/Output Summary (Last 24 hours) at 3/18/2021 1014 Last data filed at 3/17/2021 1800 Gross per 24 hour Intake 240 ml Output  Net 240 ml Blood pressure 116/68, pulse 60, temperature 97.5 °F (36.4 °C), resp. rate 17, height 5' (1.524 m), weight 58.5 kg (129 lb), SpO2 100 %. Exam:  
Pt awake and confused Chest: crackles at base CVS- s1s2 heard P/a: soft, non distended Extremity: amputation of right hand, RLE- BKA LLE- AKA  
CNS; confused BMP:  
No results found for: NA, K, CL, CO2, AGAP, GLU, BUN, CREA, GFRAA, GFRNA Recent Results (from the past 24 hour(s)) GLUCOSE, POC Collection Time: 03/17/21 12:17 PM  
Result Value Ref Range Glucose (POC) 296 (H) 70 - 110 mg/dL GLUCOSE, POC Collection Time: 03/17/21  4:49 PM  
Result Value Ref Range Glucose (POC) 154 (H) 70 - 110 mg/dL GLUCOSE, POC Collection Time: 03/17/21  9:22 PM  
Result Value Ref Range Glucose (POC) 73 70 - 110 mg/dL GLUCOSE, POC Collection Time: 03/18/21  3:34 AM  
Result Value Ref Range Glucose (POC) 66 (L) 70 - 110 mg/dL GLUCOSE, POC Collection Time: 03/18/21  6:17 AM  
Result Value Ref Range Glucose (POC) 82 70 - 110 mg/dL GLUCOSE, POC Collection Time: 03/18/21  9:53 AM  
Result Value Ref Range Glucose (POC) 82 70 - 110 mg/dL Thank you very much for consult. I will continue to follow Shelia Lay MD 
Nephrology -[facility]

## 2021-03-18 NOTE — PROGRESS NOTES
Problem: Pressure Injury - Risk of 
Goal: *Prevention of pressure injury Description: Document Lake Scale and appropriate interventions in the flowsheet. Outcome: Progressing Towards Goal 
Note: Pressure Injury Interventions: 
Sensory Interventions: Assess changes in LOC Moisture Interventions: Absorbent underpads Activity Interventions: Pressure redistribution bed/mattress(bed type) Mobility Interventions: Pressure redistribution bed/mattress (bed type) Nutrition Interventions: Document food/fluid/supplement intake Friction and Shear Interventions: HOB 30 degrees or less

## 2021-03-18 NOTE — PROGRESS NOTES
Speech Therapy Note: SLP orders received and attempted at [phone] and [phone] however, patient:   
 
[]  Lethargic, unable to be alerted enough for safe PO intake 
[]  Refused participation 
[x]  Off the unit []  NPO for procedure 
[]  Other: SLP will f/u later this day or as medically indicated. Thank you for this referral.  
 
Rosas Osorio M.S., CCC-SLP Speech Language Pathologist

## 2021-03-18 NOTE — PROGRESS NOTES
Problem: Dysphagia (Adult) Goal: *Acute Goals and Plan of Care (Insert Text) Description: Recommendations: 
Diet: Puree/thin liquid Meds: Crushed in puree Aspiration Precautions Oral Care TID Goals:  Patient will: 1. Tolerate PO trials with 0 s/s overt distress in 4/5 trials 2. Utilize compensatory swallow strategies/maneuvers (decrease bite/sip, size/rate, alt. liq/sol) with min cues in 4/5 trials 3. Perform oral-motor/laryngeal exercises to increase oropharyngeal swallow function with min cues 4. Complete an objective swallow study (i.e., MBSS) to assess swallow integrity, r/o aspiration, and determine of safest LRD, min A Outcome: Progressing Towards Goal 
  
Problem: Communication Impaired (Adult) Goal: *Acute Goals and Plan of Care (Insert Text) Description: Aphasia Goals: 
Patient will: 1. ID objects/pictures in increasing fields with 80% acc mod A 2. Follow 1-2 step commands with 80% acc modA 3. Repeat words and phrases with 80% acc modA 4. Complete responsive naming tasks with 80% acc modA Outcome: Progressing Towards Goal 
 
SPEECH LANGUAGE PATHOLOGY BEDSIDE SPEECH-LANGUAGE  
AND SWALLOW EVALUATION Patient: Alistair Bangura (64 y.o. male) Date: 3/18/2021 Primary Diagnosis: Hypoxia [R09.02] Precautions: Aspiration PLOF: SNF 
 
ASSESSMENT : 
Speech Evaluation:  
Based on the objective data described below, the patient presents with mod-severe aphasia in the setting of CVA. Patient alert, able to verbalize name and date of birth ([birth year]). To all further questions patient verbally perseverated on \"November\". Shook head in response to Y/N questions with 20% accuracy. Followed 1-step directions (thumbs up, smile, open mouth, etc.) with 10% accuracy despite visual and verbal cues, perseverating on initial direction. Patient unable to name objects or ID body parts. Patient will likely require intensive speech therapy intervention upon discharge from current facility. Swallow Evaluation: 
Based on the objective data described below, the patient presents with moderate pharyngeal dysphagia. Regular diet at baseline per EMR. Oral-motor exam limited due to poor command following, however pt observed to be edentulous with decreased lingual and labial coordination. Patient presented with ice chips, thin liquid via spoon, cup and straw, and purees with 0 overt s/sx of aspiration. Audible swallow noted. Patient presented with solid trial however was unable to masticate effectively, SLP removed bolus. Recommend downgrade to puree/thin liquid diet with feeding assistance, strict aspiration precautions, slow rate, small bites/sips, meds crushed in puree. SLP will continue to follow as indicated. Patient will benefit from skilled intervention to address the above impairments. Patient's rehabilitation potential is considered to be Fair Factors which may influence rehabilitation potential include:  
[]            None noted [x]            Mental ability/status [x]            Medical condition []            Home/family situation and support systems [x]            Safety awareness 
[]            Pain tolerance/management []            Other: PLAN : 
Recommendations and Planned Interventions: 
Puree/thin liquid Frequency/Duration: Patient will be followed by speech-language pathology 1-2 times per day/4-7 days per week to address goals. Discharge Recommendations: To Be Determined SUBJECTIVE:  
Patient stated November. OBJECTIVE:  
 
Past Medical History:  
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 Hyperlipemia Hyperparathyroidism due to end stage renal disease on dialysis [facility]) Hypokalemia Hypotension of hemodialysis Idiopathic neuropathy Iron deficiency anemia MDD (major depressive disorder) Myocardial infarct ([facility] Obstructive sleep apnea Pancytopenia ([facility] Paroxysmal atrial fibrillation (HCC) Peripheral vascular disease ([facility] Presence of aortocoronary bypass graft Thrombocytopenia ([facility] Type 2 diabetes mellitus ([facility] Past Surgical History:  
Procedure Laterality Date HX ORTHOPAEDIC    
 triple amputee NV CARDIAC SURG PROCEDURE UNLIST    
 VASCULAR SURGERY PROCEDURE UNLIST Home Situation:  
 
Diet prior to admission: Regular/thin liquid Current Diet:  Puree/thin liquid Cognitive and Communication Status: 
Neurologic State: Alert, Confused Orientation Level: Oriented to person, Disoriented to place, Disoriented to situation, Disoriented to time Cognition: Decreased attention/concentration, Decreased command following Perception: Appears intact Perseveration: Perseverates during conversation, Perseverates during mobility Safety/Judgement: Decreased awareness of environment, Decreased awareness of need for assistance, Decreased awareness of need for safety, Decreased insight into deficits Motor Speech: 
Oral-Motor Structure/Motor Speech Labial: Decreased rate; Impaired coordination Dentition: Edentulous Oral Hygiene: [place] Patricia Lingual: No impairment Velum: Unable to visualize Mandible: No impairment Language Comprehension and Expression: Auditory Comprehension Auditory Impairment: No  
Verbal Expression Primary Mode of Expression: Verbal 
Initiation: Impaired (%) Automatic Speech Task: Impaired (comment) Repetition: Impaired Naming: Impaired Objects (%): 0 % Voice: 
Vocal Quality: No impairment Oral Assessment: 
Oral Assessment Labial: Decreased rate; Impaired coordination Dentition: Edentulous Oral Hygiene: Fair 
Lingual: No impairment Velum: Unable to visualize Mandible: No impairment P.O. Trials: 
Patient Position: HUZ 51 Vocal quality prior to P.O.: No impairment Consistency Presented: Thin liquid;Puree; Solid How Presented: Self-fed/presented;Spoon;Straw;Successive swallows Bolus Acceptance: Impaired Bolus Formation/Control: Impaired Type of Impairment: Incomplete;Mastication Propulsion: No impairment Oral Residue: None Initiation of Swallow: No impairment Laryngeal Elevation: Functional 
Aspiration Signs/Symptoms: None Pharyngeal Phase Characteristics: Audible swallow Effective Modifications: Small sips and bites Cues for Modifications: Maximal 
 
Oral Phase Severity: Moderate Pharyngeal Phase Severity : No impairment PAIN: 
Pain level pre-treatment: 0/10 Pain level post-treatment: 0/10 After Evaluation:  
[]            Patient left in no apparent distress sitting up in chair 
[x]            Patient left in no apparent distress in bed 
[x]            Call bell left within reach [x]            Nursing notified []            Family present 
[]            Caregiver present 
[]            Bed alarm activated COMMUNICATION/EDUCATION:  
[x]            Aspiration precautions; swallow safety; compensatory techniques. [x]            Receptive/Expressive communication strategies [x]            Patient/family have participated as able in goal setting and plan of care. [x]            Patient/family agree to work toward stated goals and plan of care. []            Patient understands intent and goals of therapy; neutral about participation. []            Patient unable to participate in goal setting/plan of care; educ ongoing with interdisciplinary staff 
[]         Posted safety precautions in patient's room. Thank you for this referral, DERIC Batres Time Calculation: 16 mins Speech Evaluation Time: 8 minutes Swallow Evaluation Time: 8 minutes

## 2021-03-18 NOTE — PROGRESS NOTES
DC Plan: return to SNF Care manager has reached out to [facility] to verify that they will take patient back now that negative PCR has resulted; awaiting return call from admissions coordinator. Have notified patient's mother of anticipated return to SNF. 1030 update:  ID to see to evaluate covid results/infectivity; patient had change in status - increased confusion - was not dialyzed yesterday; care manager noted that dialysis nurse was [facility] at [facility] and this patient will be second case to run today; plan will be to also dialyze tomorrow to keep patient on his regular MWF schedule; have updated [facility] of potential for discharge tomorrow and will update patient's mother. 1730 - noted ID recommendation of second PCR; called unit to remind them to collect second PCR. Care Management Interventions Mode of Transport at Discharge: S Transition of Care Consult (CM Consult): Discharge Planning, [address] [place] [address] Reviewed: Yes Current Support Network: Nursing Facility(LTC resident at [address]) The Plan for Transition of Care is Related to the Following Treatment Goals : [facility] The Patient and/or Patient Representative was Provided with a Choice of Provider and Agrees with the Discharge Plan?: Yes Name of the Patient Representative Who was Provided with a Choice of Provider and Agrees with the Discharge Plan: pt 
Freedom of Choice List was Provided with Basic Dialogue that Supports the Patient's Individualized Plan of Care/Goals, Treatment Preferences and Shares the Quality Data Associated with the Providers?: Yes Discharge Location Discharge Placement: Skilled nursing facility(vs LTC)

## 2021-03-18 NOTE — CONSULTS
[facility] 
(A Division of [address] [place] [address]) Consultation Note Date of Admission: 3/11/2021    Date of Note: 3/18/2021 Referred by: Nomi Zamudio MD 
 
Reason for Referral: [id number] Current Antimicrobials:    Prior Antimicrobials: 
1. azithro IV (3/11-) #7 2. CAX IV (3/11-) #7 1. Vanco IV (3/12-15) Assessment: Rec / Plan: Altered Mental Status - likely anatomical/CVA This is the elephant in the room today; not likely to be able to return to NH until worked up/investigated further. Could be COVID-19 induced, but I am not sure he has COVID-19 (see #2 below)   
(+) RAPID COVID-19 test - antigen But 2 PCR tests this past week (3/11 and 3/15) were negative. I doubt he has COVID-19 with the negative PCR test this past Monday, but show me another one before closing the book on this. Normal lymphocytes would lead credence to this too, but all his CBCs have been without diffs. Will order some next 3d. His CT Chest last week not typical of COVID-19 ->Azithro/CAX Stop these. ESRD/HD   
PVD   
DMT2 good control A1c 6.7% Microbiology:  3/15 - PCR COVID-19 (-) 
   3/12 - RAPID COVID-19 (+) Antigen 3/11 - BCx x2 (-) 
 
   3/8 - PCR COVID-19 (-)  [employer] / Catheters: HD and peripherals HPI: 
CC:  \"[employer]\" Mr Shree Baez is a 65y AAM with multiple medical problems who was admitted from his [facility] 3/11 c/o fevers/cough. Had been seen 3/8 at [facility] ED with (-) PCR COVID-19 test.  CT Chest done here 3/11 not convincing for COVID-19 pneumonia (had bilat small/med pleural effusions). RAPID test here (+) 3/12 and he was started on short course of dexamethasone before being stopped 3/16 after a PCR COVID-19 test returned (-). Since yesterday, he has been off his baseline mental exam/confused prompting a CT Head this morning that shows multiple randee concerning for CVAs.   Currently, he knows his name/place, but confused to everything else that greatly limits his interrogation/verbally. Falls asleep easily. [employer] 3/8 - PCR COVID-19 (-) at [facility] 3/12 - RAPID COVID-19 (+) 
3/15 - PCR COVID-19 (-) Dexamethasone (3/12-16) ABO    O+ Active Hospital Problems Diagnosis Date Noted  CAD (coronary artery disease) 03/12/2021  Severe peripheral arterial disease ([address] 03/12/2021  Amputee, lower limb ([address] 03/12/2021  Amputee, upper limb, right 03/12/2021  CHF (congestive heart failure) ([address] 03/12/2021  Thrombocytopenia ([address] 03/12/2021  Decubitus ulcer of left buttock, stage 2 ([address] 03/12/2021  Fever 03/12/2021  Pneumonia due to 2019 novel coronavirus 03/12/2021  Hypoxia 03/11/2021  Pleural effusion on left 03/11/2021  DM (diabetes mellitus), type 2 with renal complications ([address] 35/71/1140  ESRD (end stage renal disease) on dialysis ([address] 03/11/2021 Past Medical History:  
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  Hyperlipemia  Hyperparathyroidism due to end stage renal disease on dialysis [facility])  Hypokalemia  Hypotension of hemodialysis  Idiopathic neuropathy  Iron deficiency anemia  MDD (major depressive disorder)  Myocardial infarct ([address]  Obstructive sleep apnea  Pancytopenia ([address]  Paroxysmal atrial fibrillation (HCC)  Peripheral vascular disease ([address]  Presence of aortocoronary bypass graft  Thrombocytopenia ([facility]  Type 2 diabetes mellitus ([facility] Past Surgical History:  
Procedure Laterality Date  HX ORTHOPAEDIC    
 triple amputee  ID CARDIAC SURG PROCEDURE UNLIST  VASCULAR SURGERY PROCEDURE UNLIST Family History Problem Relation Age of Onset  Dementia Mother  Hypertension Mother  Diabetes Father  Hypertension Father  Diabetes Sister Social History Socioeconomic History  Marital status: SINGLE Spouse name: Not on file  Number of children: Not on file  Years of education: Not on file  Highest education level: Not on file Occupational History  Not on file Social Needs  Financial resource strain: Not on file  Food insecurity Worry: Not on file Inability: Not on file  Transportation needs Medical: Not on file Non-medical: Not on file Tobacco Use  Smoking status: Never Smoker Substance and Sexual Activity  Alcohol use: Not Currently  Drug use: Not Currently  Sexual activity: Not Currently Lifestyle  Physical activity Days per week: Not on file Minutes per session: Not on file  Stress: Not on file Relationships  Social connections Talks on phone: Not on file Gets together: Not on file Attends Synagogue service: Not on file Active member of club or organization: Not on file Attends meetings of clubs or organizations: Not on file Relationship status: Not on file  Intimate partner violence Fear of current or ex partner: Not on file Emotionally abused: Not on file Physically abused: Not on file Forced sexual activity: Not on file Other Topics Concern [address] Service Not Asked  Blood Transfusions Not Asked  Caffeine Concern Not Asked  Occupational Exposure Not Asked Brendolyn Jesús Hazards Not Asked  Sleep Concern Not Asked  Stress Concern Not Asked  Weight Concern Not Asked  Special Diet Not Asked  Back Care Not Asked  Exercise Not Asked  Bike Helmet Not Asked  Seat Belt Not Asked  Self-Exams Not Asked Social History Narrative  Not on file Allergies: 
Benadryl extra strength [diphenhydramine-zinc acetate] and Gentamicin Medications: 
Current Facility-Administered Medications Medication Dose Route Frequency  [Held by provider] aspirin chewable tablet 81 mg  81 mg Oral DAILY  collagenase (SANTYL) 250 unit/gram ointment   Topical DAILY  docusate sodium (COLACE) capsule 100 mg  100 mg Oral DAILY  polyvinyl alcohol-povidon(PF) (REFRESH CLASSIC) 1.4-0.6 % ophthalmic solution 1 Drop  1 Drop Both Eyes PRN  
 midodrine (PROAMATINE) tablet 10 mg  10 mg Oral TID WITH MEALS  traZODone (DESYREL) tablet 50 mg  50 mg Oral QHS  diclofenac (VOLTAREN) 1 % topical gel 2 g  2 g Topical DAILY PRN  
 HYDROcodone-acetaminophen (NORCO) 5-325 mg per tablet 1 Tab  1 Tab Oral Q8H PRN  
 fluticasone propionate (FLONASE) 50 mcg/actuation nasal spray 2 Spray  2 Spray Both Nostrils DAILY  vit B Cmplx 3-FA-Vit C-Biotin (NEPHRO MIRACLE RX) tablet 1 Tab  1 Tab Oral DAILY  epoetin toribio-epbx (RETACRIT) injection 6,000 Units  6,000 Units SubCUTAneous Q MON, WED & FRI  famotidine (PEPCID) tablet 10 mg  10 mg Oral QPM  
 0.9% sodium chloride infusion 250 mL  250 mL IntraVENous PRN  
 albumin human 25% (BUMINATE) solution 25 g  25 g IntraVENous DIALYSIS PRN  
 sodium chloride (NS) flush 5-10 mL  5-10 mL IntraVENous PRN  
 sodium chloride (NS) flush 5-40 mL  5-40 mL IntraVENous Q8H  
 sodium chloride (NS) flush 5-40 mL  5-40 mL IntraVENous PRN  polyethylene glycol (MIRALAX) packet 17 g  17 g Oral DAILY PRN  promethazine (PHENERGAN) tablet 12.5 mg  12.5 mg Oral Q6H PRN  Or  
 ondansetron (ZOFRAN) injection 4 mg  4 mg IntraVENous Q6H PRN  
 insulin lispro (HUMALOG) injection   SubCUTAneous AC&HS  
 glucose chewable tablet 16 g  16 g Oral PRN  
 glucagon (GLUCAGEN) injection 1 mg  1 mg IntraMUSCular PRN  
 dextrose 10% infusion 125-250 mL  125-250 mL IntraVENous PRN  
  
 
ROS: 
Review of systems not obtained due to patient factors. Physical Exam: HPI: 
Temp (24hrs), Av.5 °F (36.4 °C), Min:97.2 °F (36.2 °C), Max:97.8 °F (36.6 °C) Visit Vitals /72 (BP 1 Location: Left arm, BP Patient Position: At rest) Pulse 93 Temp 97.4 °F (36.3 °C) Resp 15 Ht 5' (1.524 m) Wt 58.5 kg (129 lb) SpO2 100% BMI 25.19 kg/m² General: Well developed, well nourished 64 y.o. BLACK/ male in no acute distress. ENT: ENT exam normal, no neck nodes or sinus tenderness Head: normocephalic, without obvious abnormality Mouth:  mucous membranes moist, pharynx normal without lesions Neck: supple, symmetrical, trachea midline Cardio:  regular rate and rhythm, S1, S2 normal, no murmur, click, rub or gallop Chest: inspection normal - no chest wall deformities or tenderness, respiratory effort normal 
Lungs: clear to auscultation, no wheezes or rales and unlabored breathing Abdomen: soft, non-tender. Bowel sounds normal. No masses, no organomegaly. Extremities:  Bilat BKA and amputated R hand Neuro: dysconjugate gaze; (+) Glabellar sign; confused/lethargic Lab results: 
 
Chemistry Recent Labs  
  21 
1105 21 
0659 21 
0645 GLU 87 217* 199*  137 137  
K 4.3 4.5 4.3  105 105 CO2 24 23 24 BUN 50* 42* 31* CREA 6.61* 5.33* 4.31* CA 7.0* 7.0* 7.6* AGAP 7 9 8 BUCR 8* 8* 7* AP  --  412* 472* TP  --  6.0* 6.5 ALB  --  2.5* 2.6*  
GLOB  --  3.5 3.9 AGRAT  --  0.7* 0.7* CBC w/ Diff Recent Labs  
  21 
0700 WBC 1.5*  
RBC 4.21* HGB 9.2* HCT 29.7*  
PLT 50* Microbiology All Micro Results Procedure Component Value Units Date/Time CULTURE, BLOOD 2nd DRAW (required for [facility]) [[id number]] Collected: 21 2251 Order Status: Completed Specimen: Blood Updated: 2145 Special Requests: NO SPECIAL REQUESTS   Culture result: NO GROWTH 6 DAYS     
 CULTURE, BLOOD [[id number]] Collected: 03/11/21 2255 Order Status: Completed Specimen: Blood Updated: 03/17/21 0645 Special Requests: NO SPECIAL REQUESTS Culture result: NO GROWTH 6 DAYS     
 Loraine Draper, URINE [[id number]] Collected: 03/12/21 1830 Order Status: Canceled Specimen: Urine LEGIONELLA PNEUMOPHILA AG, URINE [[id number]] Collected: 03/12/21 1830 Order Status: Canceled Specimen: Urine CULTURE, RESPIRATORY/SPUTUM/BRONCH [employer] [[id number]] Collected: 03/12/21 1830 Order Status: Canceled Specimen: Sputum COVID-19 RAPID TEST [[id number]]  (Abnormal) Collected: 03/12/21 1230 Order Status: Completed Specimen: Nasopharyngeal Updated: 03/12/21 1512 Specimen source NOSE COVID-19 rapid test Detected Comment:     
The specimen is POSITIVE for SARS-CoV-2, the novel coronavirus associated with COVID-19. This test has been authorized by the FDA under an Emergency Use Authorization (EUA) for use by authorized laboratories. Fact sheet for Healthcare Providers: ConventionUpdate.co.nz Fact sheet for Patients: ConventionUpdate.co.nz Methodology: Isothermal Nucleic Acid Amplification CALLED TO AND CORRECTLY REPEATED BY: 
Wilner Thomas RN 3S, TO JAF AT 1512 3/12/2021 Rita Baldwin MD 
Cell [phone] [facility] 3/18/2021  
11:47 AM

## 2021-03-19 PROBLEM — I46.9 CARDIAC ARREST (HCC): Status: ACTIVE | Noted: 2021-01-01

## 2021-03-19 NOTE — PROGRESS NOTES
Palliative Medicine Goals of care defined. Will sign off. Please reconsult team as needed/if appropriate. Thank you for the Palliative Medicine consult and allowing us to participate in the care of [facility] Anna Mcclellan RN, MSN 
Palliative Medicine [phone]

## 2021-03-19 NOTE — CONSULTS
Palliative Medicine Consult Patient Name: Alistair Bangura YOB: 1960 Date of Initial Consult: March 15, 2021, 2021 Reason for Consult: Goals of care discussions Requesting Provider: Dr. Ace Conrad 
Primary Care Physician: Christiano Mascorro MD 
  
 SUMMARY:  
Alistair Bangura is a 64 y.o. with a past history of end-stage renal disease on hemodialysis, diabetes type 2, CAD, PAD severe with amputation x3 (right BKA left AKA left hand digits), CHF, and sacrum decubitus ulcer, who was admitted on 3/11/2021 from [facility] with a diagnosis of COVID-19, hypoxia, fever, and left-sided pleural effusion. Current medical issues leading to Palliative Medicine involvement include: Support and goals of care discussions in someone with multiple comorbidities. 2021: Appreciate re-consult, patient status post V. fib arrest, ROSC and intubated by anesthesia. Mother/SHAHLA Wyvivianalindseyorville Odonnell called to bedside for goals of care. Patient was subsequently made comfort measures only with DNR/DNI and compassionate extubation. Patient  shortly thereafter. PALLIATIVE DIAGNOSES:  
1. Goals of care discussions 2. End-stage renal disease on hemodialysis 3. COVID-19 
4. Acute respiratory failure 5. PAD, severe 6. Debility PLAN:  
2021: Appreciate reconsult, patient status post V. fib arrest with ROSC and intubated by anesthesia today. Patient was started on pressor support, and needed a central line but he has severe peripheral arterial disease with multiple amputations making this complicated. .  Patient's mother/SHAHLA Yg Belle was called to bedside for goals of care discussions. Discussed the benefits of burdens of continuing aggressive measures versus implementing comfort measures with compassionate extubation.   Ms. Silvestre Larios states that patient has suffered long enough and she has watched a gradual decline over these past couple of years and would like to allow for a peaceful death at this time. Explained what compassionate extubation would look like as well as comfort measures only, she waited in the waiting room while he was compassionately extubated. Ms. Shree Baez signed a POST form for the following measures: DNR/DNI, comfort measures, no feeding tubes. Patient appeared comfortable during and after extubation, and Ms. Shree Baez was able to say her goodbyes after he was compassionately extubated. Extensive support offered to family during this difficult time. Patient  soon after compassionate extubation. Appreciate the support of the RN and intensivist. 
 
See previous notes below March 15, 2021: Goals of care discussions: Palliative medicine team including John Dukes RN and I met with patient at patient's bedside. Patient is awake alert and oriented x4, getting bedside dialysis. Introduced the role of palliative medicine and support offered as patient shares that he has lived at [address] for 2 years. Patient came in with hypoxia related to COVID-19, and states that his breathing has significantly improved. Patient shares he was also infected in 2020, and has both of his Covid vaccines. Patient is not sure if he is ever completed and AMD in the past but is interested in completing today. He named his mother Toñito Stapleton as primary medical power of  and sister Kellee Fallon as secondary medical power of . Discussed the benefits and burdens of CPR in the event of cardiopulmonary arrest in the setting of end-stage renal disease coronary artery disease, multiple amputations with debility, CHF, and other chronic comorbidities. At this time patient wishes to remain full code with full interventions. We will continue to follow for support. Discussed with patient's mother Fidel Maurer, who is in agreement with medical plan. We emailed her a copy of the completed AMD. 1. End-stage renal disease on hemodialysis: Patient states dialysis is going well, he is on the Monday Wednesday Friday schedule. Nephrology is following. He is on midodrine 10 mg 3 times daily for hypotension. Anemia associated with chronic disease, gets Retacrit during HD. 2. COVID-19: rapid test positive on 3/12/2021. Associated hypoxia, which has resolved. On steroids and antioxidants, waiting on convalescent plasma. He is not a candidate for remdesivir due to renal failure. 3. Acute respiratory failure: Admitted with hypoxia, resolved now on room air. 4. PAD, severe: History of amputation x3. 
5. Debility: Lives at [address], needs assistance with all ADLs, but able to feed self. He shares his frustration with having to live in a nursing facility which she has been there for 2 years. He understands his physical limitations but does strive to be as independent as possible. 6. Initial consult note routed to primary continuity provider 7. Communicated plan of care with: Palliative IDT 
 
 
 GOALS OF CARE / TREATMENT PREFERENCES:  
[====Goals of Care====] GOALS OF CARE: DNR/DNI/comfort measures only Patient/Health Care Proxy Stated Goals: Comfort TREATMENT PREFERENCES:  
Code Status: DNR Advance Care Planning: No flowsheet data found. Medical Interventions: Comfort measures Artificially Administered Nutrition: No feeding tube The palliative care team has discussed with patient / health care proxy about goals of care / treatment preferences for patient. 
[====Goals of Care====] HISTORY:  
 
History obtained from: Patient family CHIEF COMPLAINT: N/A 
 
HPI/SUBJECTIVE: The patient is:  
[] Verbal and participatory [x] Non-participatory due to:  
Orally intubated and sedated Clinical Pain Assessment (nonverbal scale for severity on nonverbal patients):  
Clinical Pain Assessment Severity: 0 Adult Nonverbal Pain Scale Face: No particular expression or smile Activity (Movement): Lying quietly, normal position Guarding: Lying quietly, no positioning of hands over areas of body Physiology (Vital Signs): Stable vital signs Respiratory: RR > 10 above baseline or 5% decrease SpO2 mild asynchrony with ventilator Total Score: 1 FUNCTIONAL ASSESSMENT:  
 
Palliative Performance Scale (PPS): PPS: 10 PSYCHOSOCIAL/SPIRITUAL SCREENING:  
 
Advance Care Planning: No flowsheet data found. Any spiritual / Restoration concerns: 
[] Yes /  [x] No 
 
Caregiver Burnout: 
[] Yes /  [] No /  [x] No Caregiver Present Anticipatory grief assessment:  
[] Normal  / [x] Maladaptive REVIEW OF SYSTEMS:  
 
Positive and pertinent negative findings in ROS are noted above in HPI. The following systems were [x] reviewed / [] unable to be reviewed as noted in HPI Other findings are noted below. Systems: constitutional, ears/nose/mouth/throat, respiratory, gastrointestinal, genitourinary, musculoskeletal, integumentary, neurologic, psychiatric, endocrine. Positive findings noted below. Modified ESAS Completed by: provider Fatigue: 3 Pain: 0 Anxiety: 0 Nausea: 0 Dyspnea: 0 Constipation: No  
  Stool Occurrence(s): 1 PHYSICAL EXAM:  
 
From RN flowsheet: 
Wt Readings from Last 3 Encounters:  
03/19/21 58.5 kg (129 lb) Blood pressure 108/61, pulse (!) 119, temperature 98.2 °F (36.8 °C), temperature source Oral, resp. rate 26, height 5' (1.524 m), weight 58.5 kg (129 lb), SpO2 100 %. Pain Scale 1: Numeric (0 - 10) Pain Intensity 1: 0 Pain Location 1: Buttocks Pain Orientation 1: Mid 
Pain Description 1: Aching Pain Intervention(s) 1: Medication (see MAR) Constitutional: Appears chronically ill lying in bed, orally intubated and sedated on ventilator Eyes: Closed ENMT: Orally intubated Cardiovascular: Tachy rhythm Respiratory: Orally intubated on mechanical ventilator Musculoskeletal: BKA left AKA, amputation of digit on left hand, no tenderness to palpation Skin: warm, dry Neurologic: Not following commands, involuntary movements of bilateral lower extremities, no response to stimuli HISTORY:  
 
Principal Problem: 
  Encephalopathy acute (3/18/2021) Active Problems: Hypoxia (3/11/2021) Pleural effusion on left (3/11/2021) DM (diabetes mellitus), type 2 with renal complications ([address] (9/10/6430) ESRD (end stage renal disease) on dialysis ([address] (3/11/2021) CAD (coronary artery disease) (3/12/2021) Severe peripheral arterial disease ([address] (3/12/2021) Amputee, lower limb ([address] (3/12/2021) Amputee, upper limb, right (3/12/2021) CHF (congestive heart failure) ([address] (3/12/2021) Thrombocytopenia ([address] (3/12/2021) Decubitus ulcer of left buttock, stage 2 ([address] (3/12/2021) Fever (3/12/2021) Pneumonia due to 2019 novel coronavirus (3/12/2021) Cardiac arrest ([address] (3/19/2021) Past Medical History:  
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  Hyperlipemia  Hyperparathyroidism due to end stage renal disease on dialysis [facility])  Hypokalemia  Hypotension of hemodialysis  Idiopathic neuropathy  Iron deficiency anemia  MDD (major depressive disorder)  Myocardial infarct ([address]  Obstructive sleep apnea  Pancytopenia ([address]  Paroxysmal atrial fibrillation (HCC)  Peripheral vascular disease ([address]  Presence of aortocoronary bypass graft  Thrombocytopenia [facility]  Type 2 diabetes mellitus ([facility] Past Surgical History:  
Procedure Laterality Date  HX ORTHOPAEDIC    
 triple amputee  [place] CARDIAC SURG PROCEDURE UNLIST  VASCULAR SURGERY PROCEDURE UNLIST Family History Problem Relation Age of Onset  Dementia Mother  Hypertension Mother  Diabetes Father  Hypertension Father  Diabetes Sister History reviewed, no pertinent family history. Social History Tobacco Use  Smoking status: Never Smoker Substance Use Topics  Alcohol use: Not Currently Allergies Allergen Reactions  Benadryl Extra Strength [Diphenhydramine-Zinc Acetate] Unable to Obtain  Gentamicin Unable to Obtain Current Facility-Administered Medications Medication Dose Route Frequency  LORazepam (ATIVAN) injection 1 mg  1 mg IntraVENous Q15MIN PRN  
 acetaminophen (TYLENOL) tablet 650 mg  650 mg Oral Q4H PRN Or  
 acetaminophen (TYLENOL) solution 650 mg  650 mg Oral Q4H PRN Or  
 acetaminophen (TYLENOL) suppository 650 mg  650 mg Rectal Q4H PRN  
 glycopyrrolate (ROBINUL) injection 0.2 mg  0.2 mg IntraVENous Q4H PRN  
 bisacodyL (DULCOLAX) suppository 10 mg  10 mg Rectal DAILY PRN  
 morphine injection 2 mg  2 mg IntraVENous Q15MIN PRN  
 collagenase (SANTYL) 250 unit/gram ointment   Topical DAILY  polyvinyl alcohol-povidon(PF) (REFRESH CLASSIC) 1.4-0.6 % ophthalmic solution 1 Drop  1 Drop Both Eyes PRN  
 ondansetron (ZOFRAN) injection 4 mg  4 mg IntraVENous Q6H PRN  
 
 
 
 LAB AND IMAGING FINDINGS:  
 
Lab Results Component Value Date/Time WBC 5.2 03/19/2021 12:14 PM  
 HGB 8.0 (L) 03/19/2021 12:14 PM  
 PLATELET 58 (L) 54/00/0293 12:14 PM  
 
Lab Results Component Value Date/Time  Sodium 138 03/19/2021 12:14 PM  
 Potassium 3.5 03/19/2021 12:14 PM  
 Chloride 104 03/19/2021 12:14 PM  
 CO2 23 03/19/2021 12:14 PM  
 BUN 10 03/19/2021 12:14 PM  
 Creatinine 2.25 (H) 03/19/2021 12:14 PM  
 Calcium 9.7 03/19/2021 12:14 PM  
 Magnesium 1.7 03/11/2021 09:54 PM  
 Phosphorus 3.5 03/13/2021 02:55 AM  
  
Lab Results Component Value Date/Time Alk. phosphatase 412 (H) 03/17/2021 06:59 AM  
 Protein, total 6.0 (L) 03/17/2021 06:59 AM  
 Albumin 2.5 (L) 03/17/2021 06:59 AM  
 Globulin 3.5 03/17/2021 06:59 AM  
 
Lab Results Component Value Date/Time INR 1.7 (H) 03/19/2021 12:14 PM  
 Prothrombin time 19.5 (H) 03/19/2021 12:14 PM  
 aPTT 55.8 (H) 03/11/2021 09:54 PM  
  
Lab Results Component Value Date/Time Ferritin 1,354 (H) 03/13/2021 02:55 AM  
  
No results found for: PH, PCO2, PO2 No components found for: [place] Lab Results Component Value Date/Time CK 31 (L) 03/19/2021 12:14 PM  
 CK - MB 1.2 03/19/2021 12:14 PM  
  
 
 
   
 
Total time: 65 minutes Counseling / coordination time, spent as noted above: 60 minutes 
> 50% counseling / coordination?:  Yes patient and family, RN, Intensivist  
 
Prolonged service was provided for  [x]30 min   []75 min in face to face time in the presence of the patient, spent as noted above. Time Start: 1330 Time End: 3673 Note: this can only be billed with 31169 (initial) or 54489 (follow up). If multiple start / stop times, list each separately.

## 2021-03-19 NOTE — PROGRESS NOTES
Speech Therapy Note: 
 
Patient is now intubated and is no longer appropriate for ST intervention. SLP will discontinue orders accordingly. Please re-consult prior to initiating PO diet when extubated. Philippe Pizarro M.S., CCC-SLP Speech Language Pathologist

## 2021-03-19 NOTE — ANESTHESIA PROCEDURE NOTES
Emergent Intubation Performed by: Rogers Monroe CRNA Authorized by: Rogers Monroe CRNA Emergent Intubation:  
Patient location: [address] [place] [address] Date/Time:  3/19/2021 11:35 AM 
Indications:  Respiratory failure (PT CODING) Spontaneous Ventilation: absent Level of Consciousness: unresponsive Preoxygenated: Yes (AMBU BAG VENTILATION BY RT DURING CODE) Airway Documentation: Airway:  ETT - Cuffed Technique:  Direct laryngoscopy Advanced Technique:  Montes De Oca Insertion Site:  Oral 
Blade Type:  Katie Blade Size:  3 ETT size (mm):  7.5 ETT Line Dejuan:  Lips ETT Insertion depth (cm):  22 Placement verified by: auscultation, EtCO2 and BBS Attempts:  1 Difficult airway: No

## 2021-03-19 NOTE — PROGRESS NOTES
Bereavement Note: 
 
 responded to the death of Awa Henry, who is a 64 y.o., male, offering Spiritual Care to patient and family, see flow sheets for interventions. Date of Death: 20 Extended Emergency Contact Information Primary Emergency Contact: Bruce Dao Home Phone: [phone] Mobile Phone: [phone] Relation: Mother Preferred language: ENGLISH  needed? No 
Secondary Emergency Contact: Shanta Bateman Mobile Phone: [phone] Relation: Sister YES      NO  UNKNOWN Life Net   []        []    [x] [facility]   [] [] [x] Medical Examiner  []        [x]  [] Going to [place]  [x]        [] [] Autopsy   []        [x]         [] Sympathy Card  [x]        [] Bereavement Materials  []        [x] Business Card Provided  []        []  Home: Unknown at this time Patient's mother came to the [address] [place] [address] patient a DNR and left. Patient  very quickly. Nurse called his mother and informed her, they are not coming back to the Hospital,.  called Mrs. [address] [facility] and offered support  she will call with  home information. Chaplains will continue to follow family and will provide spiritual care as needed. Rev. Felicita Iqbal,Spiritual Care [facility] 
[phone]

## 2021-03-19 NOTE — PROGRESS NOTES
Bedside to respond code blue. Dr. Marjorie Chowdhury is at the bedside. Talked with pt's mother per phone, she is updated, she will go to hospital to see pt. Recommend to call taxi or let neighbor to help her transportation, no self driving recommended, she said she will call lift.

## 2021-03-19 NOTE — PROGRESS NOTES
responded to a [employer] Cynthia Rolon, who is a 64 y. o.,male. Patients Primary Language is: [place]. The reason the Patient came to the hospital is:  
Patient Active Problem List  
 Diagnosis Date Noted  Cardiac arrest ([facility] 03/19/2021  Encephalopathy acute 03/18/2021  CAD (coronary artery disease) 03/12/2021  Severe peripheral arterial disease ([address] 03/12/2021  Amputee, lower limb ([address] 03/12/2021  Amputee, upper limb, right 03/12/2021  CHF (congestive heart failure) ([address] 03/12/2021  Thrombocytopenia ([address] 03/12/2021  Decubitus ulcer of left buttock, stage 2 ([address] 03/12/2021  Fever 03/12/2021  Pneumonia due to 2019 novel coronavirus 03/12/2021  Hypoxia 03/11/2021  Pleural effusion on left 03/11/2021  DM (diabetes mellitus), type 2 with renal complications ([address] 23/02/4756  ESRD (end stage renal disease) on dialysis ([address] 03/11/2021 The following outcomes were achieved: 
Patient was resuscitated and will be moved to ICU. Dr. Milly Barreto spoke to patient's mother who is primary POA, she is on the way to the Hospital. 
 
 
Plan: 
Pastoral care on an as needed/requested basis.  recommends bedside caregivers page  on duty if patient shows signs of acute spiritual or emotional distress. Rev. Edmundo Iqbal,Spiritual Care [facility] 
[phone]

## 2021-03-19 NOTE — PROGRESS NOTES
Problem: Pressure Injury - Risk of 
Goal: *Prevention of pressure injury 
Description: Document Lake Scale and appropriate interventions in the flowsheet. 
Outcome: Progressing Towards Goal 
Note: Pressure Injury Interventions: 
Sensory Interventions: Assess changes in LOC 
 
Moisture Interventions: Absorbent underpads 
 
Activity Interventions: Pressure redistribution bed/mattress(bed type) 
 
Mobility Interventions: Pressure redistribution bed/mattress (bed type) 
 
Nutrition Interventions: Document food/fluid/supplement intake 
 
Friction and Shear Interventions: HOB 30 degrees or less 
 
  
 
 
 
  
Problem: Patient Education: Go to Patient Education Activity 
Goal: Patient/Family Education 
Outcome: Progressing Towards Goal 
  
Problem: Falls - Risk of 
Goal: *Absence of Falls 
Description: Document Aditi Fall Risk and appropriate interventions in the flowsheet. 
Outcome: Progressing Towards Goal 
Note: Fall Risk Interventions: 
  
 
Mentation Interventions: Door open when patient unattended 
 
Medication Interventions: Evaluate medications/consider consulting pharmacy 
 
Elimination Interventions: Toileting schedule/hourly rounds 
 
  
 
 
  
Problem: Patient Education: Go to Patient Education Activity 
Goal: Patient/Family Education 
Outcome: Progressing Towards Goal 
  
Problem: Diabetes Self-Management 
Goal: *Disease process and treatment process 
Description: Define diabetes and identify own type of diabetes; list 3 options for treating diabetes. 
Outcome: Progressing Towards Goal 
Goal: *Incorporating nutritional management into lifestyle 
Description: Describe effect of type, amount and timing of food on blood glucose; list 3 methods for planning meals. 
Outcome: Progressing Towards Goal 
Goal: *Incorporating physical activity into lifestyle 
Description: State effect of exercise on blood glucose levels. 
Outcome: Progressing Towards Goal 
Goal: *Developing strategies to promote health/change  behavior Description: Define the ABC's of diabetes; identify appropriate screenings, schedule and personal plan for screenings. Outcome: Progressing Towards Goal 
Goal: *Using medications safely Description: State effect of diabetes medications on diabetes; name diabetes medication taking, action and side effects. Outcome: Progressing Towards Goal 
Goal: *Monitoring blood glucose, interpreting and using results Description: Identify recommended blood glucose targets  and personal targets. Outcome: Progressing Towards Goal 
Goal: *Prevention, detection, treatment of acute complications Description: List symptoms of hyper- and hypoglycemia; describe how to treat low blood sugar and actions for lowering  high blood glucose level. Outcome: Progressing Towards Goal 
Goal: *Prevention, detection and treatment of chronic complications Description: Define the natural course of diabetes and describe the relationship of blood glucose levels to long term complications of diabetes. Outcome: Progressing Towards Goal 
Goal: *Developing strategies to address psychosocial issues Description: Describe feelings about living with diabetes; identify support needed and support network Outcome: Progressing Towards Goal 
Goal: *Insulin pump training Outcome: Progressing Towards Goal 
Goal: *Sick day guidelines Outcome: Progressing Towards Goal 
Goal: *Patient Specific Goal (EDIT GOAL, INSERT TEXT) Outcome: Progressing Towards Goal 
  
Problem: Patient Education: Go to Patient Education Activity Goal: Patient/Family Education Outcome: Progressing Towards Goal 
  
Problem: Chronic Renal Failure Goal: *Fluid and electrolytes stabilized Outcome: Progressing Towards Goal 
  
Problem: Patient Education: Go to Patient Education Activity Goal: Patient/Family Education Outcome: Progressing Towards Goal 
  
Problem: Patient Education: Go to Patient Education Activity Goal: Patient/Family Education Outcome: Progressing Towards Goal 
  
Problem: Patient Education: Go to Patient Education Activity Goal: Patient/Family Education Outcome: Progressing Towards Goal

## 2021-03-19 NOTE — PROGRESS NOTES
Code called at [id number] CPR done was intubated by CRNA with 7.5 ET tube taped at 24cm lip line. I ambu bagged Pt with 100% 02 until we got to ICU AT 1322 placed on ventilator  AC 12 fio2 70% and 5 peep,alarms set

## 2021-03-19 NOTE — PROGRESS NOTES
Intervention: Monitor/Manage Fluid Electrolyte/[employer]id [employer] PROBLEM: HEMODIALYSIS ADULT INTERVENTION: Hemodynamic stabilization Maintain BP WNL for this patient while on hemodialysis INTERVENTION: Fluid Management Will attempt 1000 ml total fluid removal as tolerated INTERVENTION: Metabolic / Electrolyte Imbalance Management K+ 3 potassium bath today with dialysis GOAL: Signs and symptoms of listed potential problems will be absent or manageable 
(reference Hemodialysis ADULT CPG) OUTCOME: Progressing HD planned for 3.5 hours today Problem: Chronic Renal Failure Goal: *Fluid and electrolytes stabilized Outcome: Progressing Towards Goal 
  
Problem: Patient Education: Go to Patient Education Activity Goal: Patient/Family Education Outcome: Progressing Towards Goal

## 2021-03-19 NOTE — CONSULTS
NEUROLOGY CONSULTATION NOTE Patient: Jerad Moreno MRN: [id number]  CSN: [id number] YOB: 1960  Age: 64 y.o. Sex: male DOA: 3/11/2021 LOS:  LOS: 8 days Requesting Physician: Dr. Kathie Cardona 
Reason for Consultation: encephalopathy HISTORY OF PRESENT ILLNESS:  
Jerad Moreno is a 64 y.o. male with past medical history of diabetes and end-stage renal disease on hemodialysis, severe PAD status post amputation bilateral lower limb and right upper limb who was transferred from [facility] convalescent home for fever and hypoxia. Patient has COVID-19 PCR negative, CT chest showed bilateral small/medium pleural effusions. Rapid Covid test positive on dexamethasone until 3/16. Patient has been confused since Wednesday. His mental status baseline is that he can talk over the phone to family member and feed himself. Brain MRI ruled out acute stroke. PAST MEDICAL HISTORY: 
Past Medical History:  
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 
Past Surgical History:  
Procedure Laterality Date  HX ORTHOPAEDIC    
 triple amputee  NV CARDIAC SURG PROCEDURE UNLIST  VASCULAR SURGERY PROCEDURE UNLIST    
 
FAMILY HISTORY: 
Family History Problem Relation Age of Onset  Dementia Mother  Hypertension Mother  Diabetes Father  Hypertension Father  Diabetes Sister SOCIAL HISTORY: 
Social History Socioeconomic History  Marital status: SINGLE Spouse name: Not on file  Number of children: Not on file  Years of education: Not on file  Highest education level: Not on file Tobacco Use  Smoking status: Never Smoker Substance and Sexual Activity  Alcohol use: Not Currently  Drug use: Not Currently  Sexual activity: Not Currently Other Topics Concern MEDICATIONS: 
Current Facility-Administered Medications Medication Dose Route Frequency  albumin human 25% (BUMINATE) 25 % solution  heparin (porcine) 1,000 unit/mL injection 4,000 Units  4,000 Units Injection DIALYSIS MON, WED & FRI  aspirin chewable tablet 81 mg  81 mg Oral DAILY  insulin lispro (HUMALOG) injection   SubCUTAneous TIDAC  piperacillin-tazobactam (ZOSYN) 2.25 g in 0.9% sodium chloride (MBP/ADV) 50 mL MBP  2.25 g IntraVENous Q8H  
 VANCOMYCIN-PHARMACY TO DOSE  1 Each Other Rx Dosing/Monitoring  LORazepam (ATIVAN) injection 0.5 mg  0.5 mg IntraVENous Q4H PRN  
 collagenase (SANTYL) 250 unit/gram ointment   Topical DAILY  docusate sodium (COLACE) capsule 100 mg  100 mg Oral DAILY  polyvinyl alcohol-povidon(PF) (REFRESH CLASSIC) 1.4-0.6 % ophthalmic solution 1 Drop  1 Drop Both Eyes PRN  
 midodrine (PROAMATINE) tablet 10 mg  10 mg Oral TID WITH MEALS  traZODone (DESYREL) tablet 50 mg  50 mg Oral QHS  diclofenac (VOLTAREN) 1 % topical gel 2 g  2 g Topical DAILY PRN  
 fluticasone propionate (FLONASE) 50 mcg/actuation nasal spray 2 Spray  2 Spray Both Nostrils DAILY  vit B Cmplx 3-FA-Vit C-Biotin (NEPHRO MIRACLE RX) tablet 1 Tab  1 Tab Oral DAILY  epoetin toribio-epbx (RETACRIT) injection 6,000 Units  6,000 Units SubCUTAneous Q MON, WED & FRI  famotidine (PEPCID) tablet 10 mg  10 mg Oral QPM  
 0.9% sodium chloride infusion 250 mL  250 mL IntraVENous PRN  
 albumin human 25% (BUMINATE) solution 25 g  25 g IntraVENous DIALYSIS PRN  
 sodium chloride (NS) flush 5-10 mL  5-10 mL IntraVENous PRN  
 sodium chloride (NS) flush 5-40 mL  5-40 mL IntraVENous Q8H  
 sodium chloride (NS) flush 5-40 mL  5-40 mL IntraVENous PRN  polyethylene glycol (MIRALAX) packet 17 g  17 g Oral DAILY PRN  promethazine (PHENERGAN) tablet 12.5 mg  12.5 mg Oral Q6H PRN Or  
 ondansetron (ZOFRAN) injection 4 mg  4 mg IntraVENous Q6H PRN  
 glucose chewable tablet 16 g  16 g Oral PRN  
 glucagon (GLUCAGEN) injection 1 mg  1 mg IntraMUSCular PRN  
 dextrose 10% infusion 125-250 mL  125-250 mL IntraVENous PRN Prior to Admission medications Not on File ALLERGIES: 
Allergies Allergen Reactions  Benadryl Extra Strength [Diphenhydramine-Zinc Acetate] Unable to Obtain  Gentamicin Unable to Obtain Review of Systems Unable to obtain PHYSICAL EXAMINATION:  
 
Visit Vitals /84 (BP 1 Location: Left upper arm, BP Patient Position: Supine) Pulse (!) 105 Temp 98 °F (36.7 °C) Resp 20 Ht 5' (1.524 m) Wt 58.5 kg (129 lb) SpO2 100% BMI 25.19 kg/m² O2 Flow Rate (L/min): 2 l/min O2 Device: Nasal cannula GENERAL: Pleasant, in no apparent distress. HEENT: Moist mucous membranes, sclerae anicteric, scalp is atraumatic. CVS: Regular rate and rhythm, no murmurs or gallops. No carotid bruits. PULMONARY: Clear to auscultation bilaterally. No rales or rhonchi. No wheezing. EXTREMITIES: Normal range of motion at all sites. No deformities. ABDOMEN: Soft, nontender. SKIN: No rashes or ecchymoses. Warm and dry. NEUROLOGIC: Awake, alert, no dysarthria. Oriented to name, asked to see his doctor. Cranial nerves:  Face is symmetric with symmetric smile. Extraocular movements are intact with no nystagmus. PERRL. Motor: Lift left arm against gravity spontaneously. Sensory: Grossly normal in light touch. Coordination: Unable to obtain Deep tendon reflexes: absent LUE Gait assessment: Bilateral lower limb amputation. Labs: Results:  
   
Chemistry Recent Labs  
  03/19/21 
0255 03/18/21 
1105 03/17/21 
5117 GLU 70* 87 217*  138 137  
K 3.6 4.3 4.5  
 107 105 CO2 24 24 23 BUN 14 50* 42* CREA 2.72* 6.61* 5.33* CA 7.7* 7.0* 7.0* AGAP 9 7 9 BUCR 5* 8* 8* AP  --   --  412* TP  --   --  6.0* ALB  --   --  2.5*  
GLOB  --   --  3.5 AGRAT  --   --  0.7* CBC w/Diff Recent Labs  
  03/19/21 
0255 03/17/21 
0700 WBC 2.8* 1.5*  
RBC 4.31* 4.21* HGB 9.3* 9.2* HCT 30.1* 29.7* PLT 62* 50* GRANS 65  --   
LYMPH 25  --   
EOS 0  --   
  
Cardiac Enzymes Recent Labs  
  03/18/21 
1620 CPK 21* CKND1 5.7* Coagulation No results for input(s): PTP, INR, APTT, INREXT in the last 72 hours. Lipid Panel No results found for: CHOL, CHOLPOCT, CHOLX, CHLST, CHOLV, 932673, HDL, HDLP, LDL, LDLC, DLDLP, 852621, VLDLC, VLDL, TGLX, TRIGL, TRIGP, TGLPOCT, CHHD, CHHDX  
BNP No results for input(s): BNPP in the last 72 hours. Liver Enzymes Recent Labs  
  03/17/21 
1062 TP 6.0* ALB 2.5* * Thyroid Studies No results found for: T4, T3U, TSH, TSHEXT Radiology: 
Mri Brain Wo Cont Result Date: 3/18/2021 Brain MR without contrast: Indication: Decreased level of consciousness. History of infarction. Procedure: Sagittal spin echo T1, axial FSE FLAIR and T2 and axial diffusion weighted scanning was performed. An axial T2* scan optimized to detect hemosiderin and calcium was performed. Coronal spin echo T1and FSE T2 scans were also performed. No contrast was administered. Comparison exam: Head CT 3/18/2021. Findings: Exam is substantially limited by patient motion.  The study is relatively diagnostic but limited. Diffusion: There are no convincing areas of restricted diffusion, no acute infarction. Axial gradient echo examination is limited by patient motion but no definite areas of acute or chronic hemorrhage. Brain parenchyma: Chronic infarction superior lateral left frontal lobe and in the superior medial left parietal lobe corresponding well to the CT evident lesions. Mild ischemic changes immediate periventricular white matter. No mass or mass effect. There is a focal fluid signal chronic infarction in the right paracentral upper huy. Ventricles and sulci: Mild prominence of sulci in the perisylvian region. Extra axial: No extra axial fluid collection or mass. Brain vasculature: Diminished flow void V4 segment right vertebral artery suspect for stenosis. Basilar artery flow void normal. Craniocervical Junction: Quite limited sagittal T1 sequence. No gross craniocervical junction abnormality. Extracranial and skull base:  Extensive fluid signal/mucosal thickening in the mastoid air cells right slightly greater than left with disease in the right middle ear. Left middle ear clear. No definite nasopharyngeal mass. Bilateral maxillary sinus polyp/retention cysts with left greater than right anterior ethmoid as well as substantial left frontal sinus mucosal thickening. No typical air-fluid level. 1. This examination is quite limited by patient motion. If symptoms persist repeat is suggested. 2. Chronic left frontal and left medial parietal cortical infarctions. Small chronic right upper pontine lacunar small vessel infarction. 3. Ischemic white matter change. 4. No evidence of convincing acute hemorrhage or acute infarction. 5. Bilateral mastoid air cell mucosal thickening/fluid with right middle ear disease which certainly could be asymptomatic. Additional paranasal sinus mucosal thickening as described above most prominent in the anterior left ethmoids and left frontal sinus.  
 
Ct Head Wo Cont Result Date: 3/18/2021 EXAM: CT head CLINICAL INDICATION/HISTORY: ams   > Additional: Altered level of consciousness. COMPARISON: None. > Reference Exam: None. TECHNIQUE: Axial CT imaging of the head was performed without intravenous contrast. Sagittal and coronal reconstructions were performed. One or more dose reduction techniques were used on this CT: automated exposure control, adjustment of the mAs and/or kVp according to patient size, and iterative reconstruction techniques. The specific techniques used on this CT exam have been documented in the patient's electronic medical record. Digital Imaging and Communications in Medicine (DICOM) format image data are available to nonaffiliated external healthcare facilities or entities on a secure, media free, reciprocally searchable basis with patient authorization for at least a 12-month period after this study. _______________ FINDINGS: BRAIN AND POSTERIOR FOSSA: Diffuse atrophy. There is no intracranial hemorrhage, mass effect, or midline shift. Cortical infarction within the medial and posterior aspects of the left posterior parietal lobe extending from the lateral ventricle. There is a chronic component located more medially, with slight increase in attenuation along the lateral aspect, may represent a non-chronic component with associated linear calcification. Additional cortical infarction involving the posterior left frontal lobe, age indeterminate. No acute hemorrhage. EXTRA-AXIAL SPACES AND MENINGES: There are no abnormal extra-axial fluid collections. CALVARIUM: Intact. SINUSES: Clear. OTHER: Bilateral aphakia. _______________ 1. No prior studies. 2. Cortical infarction within the medial posterior aspects of the left posterior parietal lobe extending from the lateral ventricle.  There appears to be more chronic component medially with slight increase in attenuation along the lateral aspect, may represent an acute to subacute component with associated linear calcification. 3. Additional age indeterminant cortical infarction involving posterior left frontal lobe. 4. No acute hemorrhage. Ct Chest Wo Cont Result Date: 3/12/2021 EXAM: CT Chest INDICATION: Hypoxia. Patient currently admitted for fever, shortness of breath, thrombocytopenia. COMPARISON: Radiograph 3/11/2021. No prior CT imaging available for review. . TECHNIQUE: Axial CT imaging from the thoracic inlet through the diaphragm Without  intravenous contrast. Multiplanar reformations were generated. One or more dose reduction techniques were used on this CT: automated exposure control, adjustment of the mAs and/or kVp according to patient size, and iterative reconstruction techniques. The specific techniques used on this CT exam have been documented in the patient's electronic medical record. Digital Imaging and Communications in Medicine (DICOM) format image data are available to nonaffiliated external healthcare facilities or entities on a secure, media free, reciprocally searchable basis with patient authorization for at least a 12-month period after this study. _______________ FINDINGS: LUNGS: Dependent opacities noted across the lung bases bilaterally, left greater than right. Mild interlobular septal line thickening noted at the lung apices as well as at the lung bases. Similar dependent changes of subsegmental atelectasis noted within the lingula and right middle lobe. Several small potentially calcified nodules within the medial portion right middle lobe (image 79) measuring approximately 2 to 3 mm in size. PLEURA: Small bilateral pleural effusions are present. No evidence of pneumothorax. AIRWAY: Central airways are patent. MEDIASTINUM: Prior median sternotomy and coronary arterial bypass grafting. Left subclavian approach dialysis catheter tip present in the distal portion SVC.  Cardiac size upper limits of normal. Multivessel coronary arterial atherosclerotic vascular calcification is present. No evidence of pericardial effusion. LYMPH NODES: No enlarged lymph nodes by size criteria. UPPER ABDOMEN: Extensive atherosclerotic vascular calcifications throughout the abdominal aorta and visceral arterial branches. Post interval changes from prior cholecystectomy. OSSEOUS STRUCTURES: Lower cervical and multilevel thoracic spondylosis is present without acute osseous abnormality. OTHER: Diffuse body wall edema is present. _______________ 1. Dependent regions of consolidation bilaterally, findings favored to reflect atelectasis particularly at the right lung base with superimposed pneumonia involving the left lower lobe difficult to exclude given the imaging appearance. 2. Mild interstitial edema and small bilateral pleural effusions. 3. Extensive atherosclerotic vascular disease both above and below the diaphragm. 4. Small potentially calcified pulmonary nodules right middle lobe likely reflecting sequela of prior granulomatous infection [address] [place] [address] Result Date: 3/12/2021 EXAM: Limited right upper quadrant abdominal ultrasound INDICATION: Looking for cirrhosis. COMPARISON: Correlation is with a CT of the chest on 3/12/2021 TECHNIQUE: Real-time sonography in multiple planes of the [right upper quadrant was performed with image documentation. Grayscale, color flow Doppler imaging, and velocity spectral waveform analysis of the portal vein was performed (duplex imaging). ] _______________ FINDINGS: PANCREAS: The pancreatic head and body are normal, the pancreatic tail was incompletely visualized due to shadowing from overlying bowel gas. LIVER: Hepatic echotexture is slightly coarse and heterogeneous. Liver capsule is smooth. No focal liver lesion. Hepatic arterial wall calcifications are present as echogenic lines, seen on the prior CT as well. Color flow Doppler and velocity spectral waveform analysis of the portal vein shows normal (hepatopedal) direction of flow. Portal vein caliber measures 11 mm. BILIARY SYSTEM: No intrahepatic biliary dilatation. Common bile duct is normal in caliber measuring 3 mm. GALLBLADDER: Cholecystectomy. RIGHT KIDNEY: Atrophic, 8.7 cm in length. No hydronephrosis or renal mass. Cortical mantle is quite thin and echogenic. 1.4 x 0.7 cm cyst noted within the interpolar kidney. IVC: Visualized portions are unremarkable in appearance. OTHER: No free intraperitoneal fluid. _______________ 1. Mildly coarse, heterogeneous hepatic echotexture, nonspecific, hepatic steatosis or other chronic liver disease. No focal liver lesion. 2. Cholecystectomy. 3. Atrophic, echogenic right kidney suggesting medical renal disease. 4. Calcific atherosclerosis. Xr Chest [facility] Kocher Result Date: 3/11/2021 EXAM: XR CHEST PORT CLINICAL INDICATION/HISTORY: meets SIRS criteria -Additional: None COMPARISON: None TECHNIQUE: Portable frontal view of the chest _______________ FINDINGS: SUPPORT DEVICES: Left chest wall tunneled dialysis catheter tip at the cavoatrial junction. HEART AND MEDIASTINUM: Prior median sternotomy and CABG. Cardiomediastinal silhouette within normal limits. LUNGS AND PLEURAL SPACES: Small layering left effusion. No dense consolidation or pneumothorax. _______________ Small layering left effusion. Echo Adult Complete Result Date: 3/12/2021 · LV: Estimated LVEF is 55 - 60%. Normal cavity size, systolic function (ejection fraction normal) and diastolic function. Increased wall thickness. E'E= 10.52. · AV: Aortic valve leaflet calcification present. · MV: Mitral valve thickening. Moderate mitral valve regurgitation is present. · TV: Mild to moderate tricuspid valve regurgitation is present. · PA: Pulmonary arterial systolic pressure is 34 mmHg.    
 
ASSESSMENT/IMPRESSION: 
27-year-old male with past medical history of diabetes, PAD, end-stage renal disease on dialysis, amputation bilateral lower limb and right upper limb presents to hospital for fever and hypoxia. Patient became confused 2 days ago. He took dexamethasone for 3 days. 1. Acute metabolic encephalopathy: Metabolic encephalopathy, multifactorial include kidney failure, adverse effect from steroids, hypoxia and pneumonia. Brain MRI ruled out acute stroke. 2. Chronic infarct: Both anterior and posterior circulation territory. Risk factors include diabetes, PAD, end-stage renal disease. RECOMMENDATIONS: 
1. ASA can be discontinued since patient has no acute stroke and has thrombocytopenia. 2.  Continue current treatment for CKD, pneumonia, hypoxia. Limit use of Zofran and Ativan. Neurology sign off, please do not hesitate to call for questions. 
------------------------------------ Mel Galeazzi, MD 
3/19/2021 
9:54 AM

## 2021-03-19 NOTE — PROGRESS NOTES
Death note : 
 
 
Paged to bedside TO ACCESS THE PATIENT. PT DID NOT RESPONSE TO  VERBAL OR PAIN STIMULI. NO BREATHING SOUND HEARD OVER 1 MIN. NO CORNEAL REFLUX ADD PUPIL DILATED. TIME OF DEATH : 14:57 FAMILY Will be  INFORMED per RN.

## 2021-03-19 NOTE — ROUTINE PROCESS
Bedside and Verbal shift change report given to Kirby Pantoja RN  (oncoming nurse) by Miesha Hall RN  (offgoing nurse). Report given with SBAR, Kardex, Intake/Output and Recent Results.

## 2021-03-19 NOTE — CONSULTS
Pulmonary Specialists Pulmonary, Critical Care, and Sleep Medicine Name: Nate Huber MRN: [id number] : 1960 Hospital: [facility] [place] Date: 3/19/2021  Room: [facility]/[address] [place] [address] Note Consult requesting physician: Dr. Willian Coppola 
 
Reason for Consult: cardiac arrest, respiratory failure IMPRESSION:  
·  
· Patient Active Problem List  
Diagnosis Code  Hypoxia R09.02  
 Pleural effusion on left J90  
 DM (diabetes mellitus), type 2 with renal complications ([facility]) N63.20  
 ESRD (end stage renal disease) on dialysis ([facility]) N18.6, Z99.2  CAD (coronary artery disease) I25.10  Severe peripheral arterial disease ([facility]) I73.9  Amputee, lower limb ([facility] [id number]  Amputee, upper limb, right Z89. 12  
 CHF (congestive heart failure) ([facility]) I50.9  Thrombocytopenia ([facility] [address] D69.6  Decubitus ulcer of left buttock, stage 2 ([facility] [address] W67.606  Fever R50.9  Pneumonia due to 2019 novel coronavirus U07.1, J12.82  
 Encephalopathy acute G93.40  Cardiac arrest ([facility]) I46.9 ·  
acute hypoxemic respiratory failure · Code status: Full code RECOMMENDATIONS:  
Respiratory: Acute hypoxemic respiratory failure. Intubated during the cardiac arrest. 
No history of chest pain or pneumonia. On admission Covid was negative Was undergoing dialysis. Rule out acute pulmonary embolus versus MI. This was V. fib arrest. 
Once stable CTA of the chest 
Letter adjusted. ABG to follow. Severe vasculopath not sure if we can get that 
6 or ET tube placement Ventilator bundle & Sedation protocol followed. Daily sedation holiday, assessment for readiness for SBT and then re-titrate if required. Chlorhexidine mouth washes. Keep SPO2 >=92%. HOB 30 degree elevation all the time. Aggressive pulmonary toileting. Aspiration precautions. Incentive spirometry.  
CVS: History of congestive heart failure coronary artery disease status post bypass. V. fib arrest. 
Could be primary cardiac event. EKG troponin echo. CT of the head if negative no stroke consider heparin drip however chronic thrombocytopenia consult cardiology ID: On admission pancultured no infection was already antibiotics continue. Repeat culture Sepsis bundle and protocol followed. Follow serial lactic acid, frequent BMP check, fluid resuscitation. Follow cultures. Deescalate antibiotic when appropriate. Hematology/Oncology: White blood cell 5.2 hemoglobin 8 thrombocytopenia was not able to receive DVT prophylaxis coagulopathy Renal: End-stage renal disease on dialysis GI/: PI 
Endocrine: Monitor BS. SSI. Neurology: Neurology on board. Has chronic ischemic strokes but last night MRI was done there was no acute stroke. CT to follow once stable 
 
Pain/Sedation: As needed fentanyl Skin/Wound: Multiple wounds from previous ulcers amputation of both lower extremities and upper extremity Electrolytes: Replace electrolytes per ICU electrolyte replacement protocol. * 
IVF: Saline Nutrition:. Prophylaxis: DVT Prophylaxis: SCD/ GI Prophylaxis: Protonix/ 
Restraints:  
Wrist soft restraints for patient interfering with medical therapy/management and patient safety. Lines/Tubes: PIV, HD catheter Other: 
PT/OT eval and treat. OOB. Advance Directive/Palliative Care: consulted Will defer respective systems problem management to primary and other respective consultant and follow patient in ICU with primary and other medical team. 
Further recommendations will be based on the patient's response to recommended treatment and results of the investigation ordered. Quality Care: PPI, DVT prophylaxis, HOB elevated, Infection control all reviewed and addressed. Care of plan d/w hospitalist team, RN, RT, MDR. 
D/w patient family (answered all questions to satisfaction).   
 
High complexity decision making was performed during the evaluation of this patient at high risk for decompensation with multiple organ involvement. Total critical care time spent rendering care exclusive of procedures/family discussion/coordination of care: 70 minutes. Subjective/History of Present Illness:  
 
Patient is a 64 y.o. male with PMHx significant for coronary static disease status post CABG, pretension on midodrine, end-stage renal disease on dialysis, diabetes, end-stage peripheral vascular disease with amputation of both lower extremity and partial right upper extremity. Diabetes. Patient had multiple vascular procedures in the past. 
This time he was admitted for lethargy. So far work-up was negative. MRI of the brain shows chronic CVA but no acute CVA, sepsis work-up so far negative. He was undergoing dialysis and became acutely unresponsive. Cardiac arrest V. fib arrest. 
Was shocked once after. CPR AP x3, bicarbonate calcium gluconate. Pressors started. Patient is requiring Levophed and Francisco-Synephrine. Still unresponsive. Axis very poor. End-stage vascular disease multiple procedures per Has dialysis catheter that is functional. 
Karley at the bedside with discussed poor prognosis. CT of the head and CTA of the chest pending patient currently in shock trying to stabilize. Before transportation 3/19/2021: 
Status post cardiac arrest. 
V. fib arrest years History of coronary disease bypass. Thrombocytopenia chronic difficulty tolerating anticoagulation. Could be also pulmonary embolus. Support with pressors ventilators IV fluids and antibiotics. Once more stable will do CT of the head and chest. 
Discussion very poor prognosis. Palliative care consulted I/O last 24 hrs: Intake/Output Summary (Last 24 hours) at 3/19/2021 1141 Last data filed at 3/19/2021 1056 Gross per 24 hour Intake 300 ml Output 900 ml Net -600 ml The patient is critically ill and can not provide additional history due to Ventilated History taken from patient emr Review of Systems: 
 
ROS not obtained due to patient factor. Allergies Allergen Reactions  Benadryl Extra Strength [Diphenhydramine-Zinc Acetate] Unable to Obtain  Gentamicin Unable to Obtain Past Medical History:  
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  
Procedure Laterality Date  HX ORTHOPAEDIC    
 triple amputee  MD CARDIAC SURG PROCEDURE UNLIST  VASCULAR SURGERY PROCEDURE UNLIST Social History Tobacco Use  Smoking status: Never Smoker Substance Use Topics  Alcohol use: Not Currently Family History Problem Relation Age of Onset  Dementia Mother  Hypertension Mother  Diabetes Father  Hypertension Father  Diabetes Sister Prior to Admission medications Not on File Current Facility-Administered Medications Medication Dose Route Frequency  heparin (porcine) 1,000 unit/mL injection 4,000 Units  4,000 Units Injection DIALYSIS MON, WED & FRI  amiodarone (NEXTERONE) 360 mg in dextrose 200 mL (1.8 mg/mL) 360 mg/200 mL (1.8 mg/mL) infusion  NOREPINephrine (LEVOPHED) 8 mg in 0.9% NS 250ml infusion  0.5-16 mcg/min IntraVENous TITRATE  [START ON 3/20/2021] Vancomycin random level due with am labs (0400 3/20/21)  1 Each Other ONCE  
 famotidine (PF) (PEPCID) 20 mg in 0.9% sodium chloride 10 mL injection  20 mg IntraVENous Q12H  
 insulin lispro (HUMALOG) injection   SubCUTAneous Q4H  piperacillin-tazobactam (ZOSYN) 2.25 g in 0.9% sodium chloride (MBP/ADV) 50 mL MBP  2.25 g IntraVENous Q8H  
 VANCOMYCIN-PHARMACY TO DOSE  1 Each Other Rx Dosing/Monitoring  collagenase (SANTYL) 250 unit/gram ointment   Topical DAILY  [Held by provider] midodrine (PROAMATINE) tablet 10 mg  10 mg Oral TID WITH MEALS  fluticasone propionate (FLONASE) 50 mcg/actuation nasal spray 2 Spray  2 Spray Both Nostrils DAILY  vit B Cmplx 3-FA-Vit C-Biotin (NEPHRO MIRACLE RX) tablet 1 Tab  1 Tab Oral DAILY  epoetin toribio-epbx (RETACRIT) injection 6,000 Units  6,000 Units SubCUTAneous Q MON, WED & FRI  sodium chloride (NS) flush 5-40 mL  5-40 mL IntraVENous Q8H Objective:  
Vital Signs:   
Visit Vitals BP (!) 90/49 Pulse (!) 116 Temp 98.2 °F (36.8 °C) (Oral) Resp 20 Ht 5' (1.524 m) Wt 58.5 kg (129 lb) SpO2 100% BMI 25.19 kg/m² O2 Device: Nasal cannula O2 Flow Rate (L/min): 2 l/min Temp (24hrs), Av.6 °F (36.4 °C), Min:97.1 °F (36.2 °C), Max:98.2 °F (36.8 °C) Intake/Output:  
Last shift:       07 - 1900 In: 300 [I.V.:300] Out: - Last 3 shifts:  190 -  0700 In: -  
Out: 900 Intake/Output Summary (Last 24 hours) at 3/19/2021 1141 Last data filed at 3/19/2021 1056 Gross per 24 hour Intake 300 ml Output 900 ml Net -600 ml Last 3 Recorded Weights in this Encounter 21 0747 21 9253 03/15/21 0502 Weight: 60.8 kg (134 lb) 60.8 kg (134 lb) 58.5 kg (129 lb) Ventilator Settings: 
Mode Rate Tidal Volume Pressure FiO2 PEEP  
 Peak airway pressure:     
Plateau pressure:    
Tidal volume:   
Minute ventilation: No results for input(s): PHI, PHI, POC2, PCO2I, PO2, PO2I, HCO3, HCO3I, FIO2, FIO2I in the last 72 hours. Physical Exam:  
 
Impresson general : Unresponsive intubated Head:   Normocephalic,atraumatic. ENT:   EOM, no scleral icterus, no pallor, no cyanosis. Nose:   No sinus tenderness Throat:  Oropharynx ,mucosa, and tongue normal. No oral thrush. Neck:   Supple, symmetric. Lymph nodes. Trachea is 9 Lung: Moderate air entry bilateral equal No rales. No rhonchi. No wheezing. No stridors. No prolongded expiration. No accessory muscle use. Heart:   Regular  rate & rhythm. S1 S2 present. No murmur. No JVD. Abdomen:  Soft. NT. ND. +BS. No masses. liver  and spleen Extremities:  Bilateral lower extremity amputations right upper extremity partially amputated severe peripheral vascular disease weak pulses Pulses: Peripheral pulses only central pulses internal jugular and femoral are due to severe peripheral vascular disease Lymphatic:  neck and supraclavicular Musculoskeletal: No joint swelling. No tenderness. Skin:   Scarring previous lesions. No discharge. Data:  
   
Recent Results (from the past 24 hour(s)) GLUCOSE, POC Collection Time: 03/18/21  3:51 PM  
Result Value Ref Range Glucose (POC) 96 70 - 110 mg/dL CARDIAC PANEL,(CK, CKMB & TROPONIN) Collection Time: 03/18/21  4:20 PM  
Result Value Ref Range CK - MB 1.2 <3.6 ng/ml CK-MB Index 5.7 (H) 0.0 - 4.0 % CK 21 (L) 39 - 308 U/L Troponin-I, QT <0.02 0.0 - 0.045 NG/ML  
GLUCOSE, POC Collection Time: 03/18/21  9:08 PM  
Result Value Ref Range Glucose (POC) 85 70 - 110 mg/dL CBC WITH AUTOMATED DIFF Collection Time: 03/19/21  2:55 AM  
Result Value Ref Range WBC 2.8 (L) 4.6 - 13.2 K/uL  
 RBC 4.31 (L) 4.70 - 5.50 M/uL HGB 9.3 (L) 13.0 - 16.0 g/dL HCT 30.1 (L) 36.0 - 48.0 %  MCV 69.8 (L) 74.0 - 97.0 FL MCH 21.6 (L) 24.0 - 34.0 PG  
 MCHC 30.9 (L) 31.0 - 37.0 g/dL RDW 20.3 (H) 11.6 - 14.5 % PLATELET 62 (L) 987 - 420 K/uL NEUTROPHILS 65 42 - 75 % BAND NEUTROPHILS 2 0 - 5 % LYMPHOCYTES 25 20 - 51 % MONOCYTES 8 2 - 9 % EOSINOPHILS 0 0 - 5 % BASOPHILS 0 0 - 3 %  
 ABS. NEUTROPHILS 1.8 1.8 - 8.0 K/UL  
 ABS. LYMPHOCYTES 0.7 (L) 0.8 - 3.5 K/UL  
 ABS. MONOCYTES 0.2 0 - 1.0 K/UL  
 ABS. EOSINOPHILS 0.0 0.0 - 0.4 K/UL  
 ABS. BASOPHILS 0.0 0.0 - 0.1 K/UL PLATELET COMMENTS LARGE PLATELETS    
 RBC COMMENTS POLYCHROMASIA 1+ 
    
 RBC COMMENTS SCHISTOCYTES 1+ RBC COMMENTS OVALOCYTES 2+ 
    
 RBC COMMENTS ANISOCYTOSIS 2+ 
    
 RBC COMMENTS HYPOCHROMIA 3+ 
    
 RBC COMMENTS MICROCYTOSIS 2+ 
    
 RBC COMMENTS POIKILOCYTOSIS 1+ 
    
 RBC COMMENTS TARGET CELLS 
FEW 
    
 WBC COMMENTS REACTIVE LYMPHS    
 DF MANUAL METABOLIC PANEL, BASIC Collection Time: 03/19/21  2:55 AM  
Result Value Ref Range Sodium 138 136 - 145 mmol/L Potassium 3.6 3.5 - 5.5 mmol/L Chloride 105 100 - 111 mmol/L  
 CO2 24 21 - 32 mmol/L Anion gap 9 3.0 - 18 mmol/L Glucose 70 (L) 74 - 99 mg/dL BUN 14 7.0 - 18 MG/DL Creatinine 2.72 (H) 0.6 - 1.3 MG/DL  
 BUN/Creatinine ratio 5 (L) 12 - 20 GFR est AA 29 (L) >60 ml/min/1.73m2 GFR est non-AA 24 (L) >60 ml/min/1.73m2 Calcium 7.7 (L) 8.5 - 10.1 MG/DL  
EKG, 12 LEAD, SUBSEQUENT Collection Time: 03/19/21  4:35 AM  
Result Value Ref Range Ventricular Rate 90 BPM  
 Atrial Rate 90 BPM  
 P-R Interval 150 ms QRS Duration 90 ms Q-T Interval 324 ms QTC Calculation (Bezet) 396 ms Calculated P Axis 70 degrees Calculated R Axis 27 degrees Calculated T Axis 110 degrees Diagnosis Sinus rhythm premature atrial complexes Low voltage QRS Nonspecific ST and T wave abnormality Abnormal ECG When compared with ECG of 11-MAR-2021 21:47, No significant change was found Confirmed by Yaima Lezama MD, -- ([id number]) on 3/19/2021 9:37:12 AM 
  
GLUCOSE, POC Collection Time: 03/19/21  6:01 AM  
Result Value Ref Range Glucose (POC) 64 (L) 70 - 110 mg/dL GLUCOSE, POC Collection Time: 03/19/21  6:32 AM  
Result Value Ref Range Glucose (POC) 179 (H) 70 - 110 mg/dL GLUCOSE, POC Collection Time: 03/19/21 11:28 AM  
Result Value Ref Range Glucose (POC) 90 70 - 110 mg/dL Chemistry Recent Labs  
  03/19/21 
0255 03/18/21 
1105 03/17/21 
4704 GLU 70* 87 217*  138 137  
K 3.6 4.3 4.5  
 107 105 CO2 24 24 23 BUN 14 50* 42* CREA 2.72* 6.61* 5.33* CA 7.7* 7.0* 7.0* AGAP 9 7 9 BUCR 5* 8* 8* AP  --   --  412* TP  --   --  6.0* ALB  --   --  2.5*  
GLOB  --   --  3.5 AGRAT  --   --  0.7* Lactic Acid No results found for: LAC No results for input(s): LAC in the last 72 hours. Liver Enzymes Protein, total  
Date Value Ref Range Status 03/17/2021 6.0 (L) 6.4 - 8.2 g/dL Final  
 
Albumin Date Value Ref Range Status 03/17/2021 2.5 (L) 3.4 - 5.0 g/dL Final  
 
Globulin Date Value Ref Range Status 03/17/2021 3.5 2.0 - 4.0 g/dL Final  
 
A-G Ratio Date Value Ref Range Status 03/17/2021 0.7 (L) 0.8 - 1.7   Final  
 
Alk. phosphatase Date Value Ref Range Status 03/17/2021 412 (H) 45 - 117 U/L Final  
 
Recent Labs  
  03/17/21 
4940 TP 6.0* ALB 2.5*  
GLOB 3.5 AGRAT 0.7* * CBC w/Diff Recent Labs  
  03/19/21 
0255 03/17/21 
0700 WBC 2.8* 1.5*  
RBC 4.31* 4.21* HGB 9.3* 9.2* HCT 30.1* 29.7* PLT 62* 50* GRANS 65  --   
LYMPH 25  --   
EOS 0  --   
  
 
Cardiac Enzymes Lab Results Component Value Date/Time CPK 21 (L) 03/18/2021 04:20 PM  
 CKMB 1.2 03/18/2021 04:20 PM  
 CKND1 5.7 (H) 03/18/2021 04:20 PM  
 TROIQ <0.02 03/18/2021 04:20 PM  
  
 
BNP No results found for: BNP, BNPP, XBNPT Coagulation No results for input(s): PTP, INR, APTT, INREXT in the last 72 hours.    
 
Thyroid  No results found for: T4, T3U, TSH, TSHEXT No results found for: T4  
 
Urinalysis No results found for: COLOR, APPRN, SPGRU, REFSG, SHERIDAN, PROTU, GLUCU, KETU, BILU, UROU, PATRIC, LEUKU, GLUKE, EPSU, BACTU, WBCU, RBCU, CASTS, UCRY Micro  No results for input(s): SDES, CULT in the last 72 hours. No results for input(s): CULT in the last 72 hours. Culture data during this hospitalization. All Micro Results Procedure Component Value Units Date/Time CULTURE, BLOOD [[id number]] Collected: 03/18/21 1955 Order Status: Completed Specimen: Blood Updated: 03/18/21 2115 CULTURE, BLOOD [[id number]] Collected: 03/18/21 1620 Order Status: Completed Specimen: Blood Updated: 03/18/21 1835 CULTURE, BLOOD 2nd DRAW (required for [facility]) [[id number]] Collected: 03/11/21 2254 Order Status: Completed Specimen: Blood Updated: 03/17/21 0645 Special Requests: NO SPECIAL REQUESTS Culture result: NO GROWTH 6 DAYS     
 CULTURE, BLOOD [[id number]] Collected: 03/11/21 2255 Order Status: Completed Specimen: Blood Updated: 03/17/21 0645 Special Requests: NO SPECIAL REQUESTS Culture result: NO GROWTH 6 DAYS     
 Federico French, URINE [[id number]] Collected: 03/12/21 1830 Order Status: Canceled Specimen: Urine LEGIONELLA PNEUMOPHILA AG, URINE [[id number]] Collected: 03/12/21 1830 Order Status: Canceled Specimen: Urine CULTURE, RESPIRATORY/SPUTUM/BRONCH Chichi Plump [[id number]] Collected: 03/12/21 1830 Order Status: Canceled Specimen: Sputum COVID-19 RAPID TEST [[id number]]  (Abnormal) Collected: 03/12/21 1230 Order Status: Completed Specimen: Nasopharyngeal Updated: 03/12/21 1512 Specimen source NOSE COVID-19 rapid test Detected Comment:     
The specimen is POSITIVE for SARS-CoV-2, the novel coronavirus associated with COVID-19. This test has been authorized by the FDA under an Emergency Use Authorization (EUA) for use by authorized laboratories.   
    
Fact sheet for Healthcare Providers: ConventionBrain Paradedate.co.nz Fact sheet for Patients: ConventionUpdate.co.nz Methodology: Isothermal Nucleic Acid Amplification CALLED TO AND CORRECTLY REPEATED BY: 
Dinorah Gutierres RN 3S, TO [facility] AT 1512 3/12/2021 PFT Ultrasound LE Doppler ECHO Images report reviewed by me: 
CT (Most Recent) (CT chest reviewed by me) [facility] HEAD WO CONT Narrative EXAM: CT head CLINICAL INDICATION/HISTORY: ams 
  > Additional: Altered level of consciousness. COMPARISON: None. > Reference Exam: None. TECHNIQUE: Axial CT imaging of the head was performed without intravenous 
contrast. Sagittal and coronal reconstructions were performed. One or more dose 
reduction techniques were used on this CT: automated exposure control, 
adjustment of the mAs and/or kVp according to patient size, and iterative 
reconstruction techniques. The specific techniques used on this CT exam have 
been documented in the patient's electronic medical record. Digital Imaging and 
Communications in Medicine (DICOM) format image data are available to 
nonaffiliated external healthcare facilities or entities on a secure, media 
free, reciprocally searchable basis with patient authorization for at least a 
12-month period after this study. _______________ FINDINGS: 
 
BRAIN AND POSTERIOR FOSSA: Diffuse atrophy. There is no intracranial hemorrhage, 
mass effect, or midline shift. Cortical infarction within the medial and 
posterior aspects of the left posterior parietal lobe extending from the lateral 
ventricle. There is a chronic component located more medially, with slight 
increase in attenuation along the lateral aspect, may represent a non-chronic 
component with associated linear calcification. Additional cortical infarction 
involving the posterior left frontal lobe, age indeterminate.  No acute 
hemorrhage. EXTRA-AXIAL SPACES AND MENINGES: There are no abnormal extra-axial fluid 
collections. CALVARIUM: Intact. SINUSES: Clear. OTHER: Bilateral aphakia. 
 
_______________ Impression 1. No prior studies. 2. Cortical infarction within the medial posterior aspects of the left posterior 
parietal lobe extending from the lateral ventricle. There appears to be more 
chronic component medially with slight increase in attenuation along the lateral 
aspect, may represent an acute to subacute component with associated linear 
calcification. 3. Additional age indeterminant cortical infarction involving posterior left 
frontal lobe. 4. No acute hemorrhage. CXR reviewed by me: 
XR (Most Recent). CXR  reviewed by me and compared with previous CXR Results from [facility] [place] [place] Encounter encounter on 03/11/21 XR CHEST PORT Narrative EXAM: XR CHEST PORT 
 
CLINICAL INDICATION/HISTORY: meets SIRS criteria 
-Additional: None COMPARISON: None TECHNIQUE: Portable frontal view of the chest 
 
_______________ FINDINGS: 
 
SUPPORT DEVICES: Left chest wall tunneled dialysis catheter tip at the 
cavoatrial junction. HEART AND MEDIASTINUM: Prior median sternotomy and CABG. Cardiomediastinal 
silhouette within normal limits. LUNGS AND PLEURAL SPACES: Small layering left effusion. No dense consolidation 
or pneumothorax. 
 
_______________ Impression Small layering left effusion. Everette De Dios MD 
3/19/2021

## 2021-03-19 NOTE — PROGRESS NOTES
Pharmacy Dosing Services: Renal Dosing The following medication: Pepcid was automatically dose-adjusted per THE [facility] P[facility]T Committee Protocol, with respect to renal function. Consult provided for this   64 y.o. , male , for the indication of GERD. Pt Weight:  
Wt Readings from Last 1 Encounters:  
03/15/21 58.5 kg (129 lb) Previous Regimen Pepcid 20 mg IV q12h Serum Creatinine Lab Results Component Value Date/Time Creatinine 2.72 (H) 03/19/2021 02:55 AM  
   
Creatinine Clearance Estimated Creatinine Clearance: 20.9 mL/min (A) (based on SCr of 2.72 mg/dL (H)). BUN Lab Results Component Value Date/Time BUN 14 03/19/2021 02:55 AM  
   
Dosage changed to:  Pepcid 10 mg IV daily Pharmacy to continue to monitor patient daily. Will make dosage adjustments based upon changing renal function. Signed Farrukh Lyons information: [phone]

## 2021-03-19 NOTE — PROGRESS NOTES
Hospitalist Progress Note Patient: Edy Whittaker MRN: [id number]  CSN: [id number] YOB: 1960  Age: 64 y.o. Sex: male DOA: 3/11/2021 LOS:  LOS: 8 days Chief Complaint: 
 
Code blue Assessment/Plan Code blue called this am 
Patient in Vfib, unresponsive, on dialysis ACLS protocol began, one shock delivered, code team arrived Junctional rhythm Then PEA, so epinephrine administered, bicarb and calcium IV 
BG 90 Subsequent epi for PEA Then pulse returned, BP wnl, patient intubated by anesthesia 
intensivist at bedside Patient to go to ICU 
prognosis dismal 
Palliative care consult Stat labs, CXR Cardiac arrest, may be acute MI, ? PE, INR is 2 Resuscitated at this juncture Pressor support 
amio gtt 
  
61-year-old male with type 2 diabetes mellitus with end-stage renal disease on hemodialysis, severe peripheral arterial disease with 3 extremity amputations, CAD, thrombocytopenia, CHF, and CAD is admitted with hypoxia, fever, and left sided pleural effusion.  
  
covid PCR negative 
  
Metabolic encephalopathy, stroke ruled out by MRI Hypoxiaresolved Anemia, thrombocytopenia, no blood thinners 
  
Pleural effusion on left with layering On zosyn and IV vanco for possible PNA 
  
ESRD on hemodialysis with left chest wall TDC in place 
dialysis Monday/Wednesday/Friday at the [address] [facility]   
HD per renal  
  
CHFEF was 55% Echo ef wnl  
  
Decubitus ulcer of the left buttock, stage II Wound care consult 
  
Type 2 diabetes mellitus-folllow BG every 4 Hrs 
  
CAD Severe peripheral arterial disease with triple amputation-both legs and right forearm, AKA left, BKA right Critical illness, dismal prognosis, high risk for mortality Mother contacted to come in and help review goals of care 
  
 
Disposition : 
Patient Active Problem List  
Diagnosis Code  Hypoxia R09.02  
 Pleural effusion on left J90  
 DM (diabetes mellitus), type 2 with renal complications ([facility]) L05.10  
 ESRD (end stage renal disease) on dialysis ([facility]) N18.6, Z99.2  CAD (coronary artery disease) I25.10  Severe peripheral arterial disease ([facility]) I73.9  Amputee, lower limb ([facility] [id number]  Amputee, upper limb, right Z89. 12  
 CHF (congestive heart failure) ([facility]) I50.9  Thrombocytopenia ([facility] [address] D69.6  Decubitus ulcer of left buttock, stage 2 ([facility] [address] H85.000  Fever R50.9  Pneumonia due to 2019 novel coronavirus U07.1, J12.82  
 Encephalopathy acute G93.40  Cardiac arrest ([facility] [address] I46.9 Subjective: 
 
Saw this am as starting dialysis, he was able to answer where he was, and denied pain No acute events reported by nursing except complained of phantom pain to nurse Code blue called after just over 1 hr of dialysis Review of systems: 
When able to speak earlier he 
Constitutional: denies fevers Respiratory: denies SOB Cardiovascular: denies chest pain Gastrointestinal: denies nausea Vital signs/Intake and Output: 
Visit Vitals BP (!) 90/49 Pulse (!) 116 Temp 98.2 °F (36.8 °C) (Oral) Resp 20 Ht 5' (1.524 m) Wt 58.5 kg (129 lb) SpO2 100% BMI 25.19 kg/m² Current Shift:  03/19 0701 - 03/19 1900 In: 300 [I.V.:300] Out: - Last three shifts:  03/17 1901 - 03/19 0700 In: -  
Out: 900 Exam: 
 
General:ill debilitated AAM, chronically debilitated and weak appearing, oriented to person, place CVS:Regular rate and rhythm, no M/R/G, S1/S2 heard, no thrill Lungs:Clear to auscultation bilaterally, no wheezes, rhonchi, or rales Abdomen: Soft, Nontender, No distention, Normal Bowel sounds, No hepatomegaly Extremities: left AKA< right BKA, right forearm amp Neuro:grossly normal , follows commands Psych:weak, frail Labs: Results:  
   
Chemistry Recent Labs  
  03/19/21 
0255 03/18/21 
1105 03/17/21 
3249 GLU 70* 87 217*  138 137  
K 3.6 4.3 4.5  
 107 105 CO2 24 24 23 BUN 14 50* 42* CREA 2.72* 6.61* 5.33* CA 7.7* 7.0* 7.0* AGAP 9 7 9 BUCR 5* 8* 8* AP  --   --  412* TP  --   --  6.0* ALB  --   --  2.5*  
GLOB  --   --  3.5 AGRAT  --   --  0.7* CBC w/Diff Recent Labs  
  03/19/21 
0255 03/17/21 
0700 WBC 2.8* 1.5*  
RBC 4.31* 4.21* HGB 9.3* 9.2* HCT 30.1* 29.7* PLT 62* 50* GRANS 65  --   
LYMPH 25  --   
EOS 0  --   
  
Cardiac Enzymes Recent Labs  
  03/18/21 
1620 CPK 21* CKND1 5.7* Coagulation No results for input(s): PTP, INR, APTT, INREXT, INREXT in the last 72 hours. Lipid Panel No results found for: CHOL, CHOLPOCT, CHOLX, CHLST, CHOLV, 970854, HDL, HDLP, LDL, LDLC, DLDLP, 093882, VLDLC, VLDL, TGLX, TRIGL, TRIGP, TGLPOCT, CHHD, CHHDX  
BNP No results for input(s): BNPP in the last 72 hours. Liver Enzymes Recent Labs  
  03/17/21 
2942 TP 6.0* ALB 2.5* * Thyroid Studies No results found for: T4, T3U, TSH, TSHEXT, TSHEXT Procedures/imaging: see electronic medical records for all procedures/Xrays and details which were not copied into this note but were reviewed prior to creation of Plan Marvin Kenney MD

## 2021-03-19 NOTE — CONSULTS
Full note to follow: 
Patient s/p cardiac arrest with ROSC, intubated, on pressor support. MPOA/mother Orlin Sotelo called to bedside. Discussed the benefits and burdens of continuing aggressive measures versus implementing comfort measures with compassionate extubation. Mother states he has been suffering for a long time and is ready to allow a peaceful,natural death. Patient is now comfort measures only. Orders placed.

## 2021-03-19 NOTE — PROGRESS NOTES
VF arrest 
Case discussed now intubated ICU ASAP Later CTa r/o PE Full note to follow NAYELI Cullen MD

## 2021-03-19 NOTE — PROGRESS NOTES
Palliative Medicine Patient experienced cardiac arrest during dialysis this morning. Requiring pressors and intubation after ROSC. Met with mother in the ICU waiting room as we were waiting for [facility] to be transferred to ICU. Sree Alvarado NP joined the conversation. Explained the severity of Papito's situation and that there was little chance of him sustaining his vital functions without life support and that, even if he did survive, the chance of his returning to his pre-arrest state was negligible. She took time to speak with her  (who was in the car in the parking lot) and her daughter via phone. Together they decided that the best course for [facility] was compassionate extubation and comfort care. She spent time with [facility] before and after extubation. She left the hospital to take her  home and Papito  soon after. ICU staff to notify her. Carla Bowling RN, MSN 
P: [phone]

## 2021-03-19 NOTE — PROGRESS NOTES
TREATMENT SUMMARY Tx Initiation Note: Received patient in bed semi fowlers position. Patient is confused and aware of self only. He does not respond to most questions. MD Mary Lou Kaba is aware of same. Left IJ CVC remains intact. No s/s of infection noted. Dressing to same in place. Treatment initiated as per MD order. Patient was extremly restless and had to be medicated by Primary nurse to continue HD tx. Primary nurse said patient spit out his midodrine tablet. Patient is hypotensive and tachycardic through out tx. Albumin 25 grams administered X 2. UF turned off for an hour and 9 min. UF goal decreased. HD treatment completed . 900 mL NET removed. Patient rinsed with 250 mL 0.9% N/S. 2.0 mL instilled in arterial lumen, 2.0mL instilled in venous lumen of Left IJ TDC. Caps applied to lumen ends securely. Patient remains in  bed 359 in stable condition. O2 Drj025%. 2 L of O2 via nasal cannula in place. No acute  distress noted. Bed in lowest position, call bell within reach. TREATMENT NOTES  
  
                                                                                   
   
 
ACUTE HEMODIALYSIS FLOW SHEET 
  
 
PATIENT INFORMATION [facility]       DR. Mary Lou Kaba []1st Time Acute  [x]Stat[] Routine []Urgent []Chronic Unit  
[]Acute Room []Bedside  []ICU/CCU []ER Isolation Precautions: [x]Dialysis[] Airborne []Contact []Droplet []Reverse Special Considerations:_______  [] Blood Consent Verified  []N/A Allergies:[] NKA [x] __ Benadryl Extra Strength [Diphenhydramine-zinc Acetate] Unable to Obtain Not Specified  3/11/2021 Gentamicin Unable to Obtain Not Specified  3/11/2021   
  
  
 
___________ Code Status [x]Full Code [] DNR  [] Other_____ Diet: [] Renal [] NPO [] Diabetic  
[] Enteral Feeding [] Cardiac Diabetic: [x]Yes []No 
  
[x]Signed Treatment Consent Verified  
[x] Time Out/ Safety Check PRIMARY NURSE REPORT: FIRST INITIAL/ LAST NAME/TITLE 
PRE DIALYSIS:          [facility] RN                                TIME:2100 ACCESS CATHETER ACCESS: [] N/A  [] RIGHT  [x] LEFT  [x] IJ  [] SUBCL [] FEM [] First use X-ray  [x] Tunnel     [] Non-Tunneled [x] No S/S infection  [] Redness [] Drainage  [] Cultured [] Swelling [] Pain  
                 [x] Medical Aseptic [] Prep Dressing Changed  
               [] Clotted [x] Patent []      Flows: [x] Good [] Poor [] Reversed If Access Problem Dr. Gal Fofana: [] Yes [] No    Date:_____  [] N/A[]  
GRAFT/FISTULA ACCESS:  [x] N/A  [] RIGHT  [] LEFT  [] UE   [] LE   
   [] AVG  [] AVF [] BUTTONHOLE 
  [] +BRUIT/THRILL [] MEDICAL ASEPTIC PREP [] No S/S infection  [] Redness [] Drainage  [] Cultured [] Swelling [] Pain If Access Problem Dr. Gal Fofana: [] Yes [] No    Date:______ [x] N/A  
GENERAL ASSESSMENT  
LUNGS:  SaO2% ____ [] Clear [] Coarse [] Crackles [] Wheezing  
            [x] Diminished Location: [] RLL [] LLL [] RUL [] DOLORES   
COUGH:  [] Productive  [] Loose[] Dry [x] N/A  RESPIRATIONS: [x] Easy [] Labored THERAPY: [] RA   [x] NC __2_. L/min    Mask: [] NRB [] Venti  _____O2%    
             [] Ventilator [] Intubated [] Trach [] BiPap [] CPap [] HI Flow CARDIAC: [] Regular [x] Irregular [] Pericardial Rub [] JVD Monitored Rhythm:__Tachycardia____ [] N/A  
EDEMA: [x] None [] Generalized [] Facial [] Pedal [] UE [] LE 
           [] Pitting [] 1 [] 2 [] 3 [] 4    [] Right [] Left [] Bilateral  
SKIN:    [x] Warm [] Hot [] Cold  [] Dry [] Pale [] Diaphoretic  
           [] Flushed [] Jaundiced [] Cyanotic [] Rash [] Weeping LOC:    [x] Alert  Oriented to: [x] Person [] Place [] Time  
          [x] Confused [] Lethargic [] Medicated [] Non-responsive GI/ABDOMEN: [] Flat [] Distended [x] Soft [] Firm [] Diarrhea [x] Bowel Sounds Present [] Nausea [] Vomiting PAIN: [] 0 [] 1 [] 2 [] 3 [] 4 [] 5 [] 6 [] 7 [] 8 [] 9 [] 10 
        Scale 1-10 Action/Follow Up__UTA patient does not answer as per reid bakers scale pain is zero___ MOBILITY: [] Amb [] Amb/Assist [x] Bed  [] Wheelchair CURRENT LABS HBsAg ONLY: Date Drawn:  03/12/2021           [x] Negative [] Positive [] Unknown. HBsAb: Date Drawn: 03/12/2021             [] Susceptible <10 [x] Immune ?10 [] Unknown Date of Current Labs:  ATTACHED  
 
EDUCATION Person Educated: [x] Patient [] Other_________ Knowledge base: [] None [x] Minimal [] Substantial   
Barriers to learning  [] None ___Patient is confused, oriented to self____________ Preferred method of learning: [] Written [x] Oral [x] Visual [] Hands on Topic: [] Access Care [] S&S of infection [x] Fluid Management 
 [] K+  [x] Procedural  [x] Albumin [] Medications [] Tx Options [] Transplant [] Diet [] Other Teaching Tools: [x] Explain [] Demonstration [] Handout_____ [] Video______ No evidence of learning CARE PLAN [x] Renal Failure (Adult) Interdisciplinary · Fluid and electrolytes stabilized ? Interventions · Dehydration signs and symptoms (eg: Weight/lab monitoring; vomiting/diarrhea/urine; tenting; mucous membranes; dizziness/lethargy/irritability/confusion; weak pulse; tachycardia; blood pressure; I&O) · Fluid overload signs and symptoms assessment (eg: Body weight increased; dyspnea; edema; hypertension; respiratory crackles/wheezing; JVD; lab monitoring; mental status changes; I&O) · Monitor appropriate lab values · COMPLIANCE WITH PRESCRIBED THERAPY · ARTERIAL ACCESS SITE ASSESSMENT 
· NUTRITION SCREENING 
· Vital signs monitoring per assessed patient condition or unit standard · Cardiac monitoring · Hydration management · Intake and output measurement · Body weight monitoring · Skin care · DIALYSIS 
· Nutrition Care Process per nutrition screen · Oral hygiene care every 2 hours · Pain management · Outcome ?  [x] Progressing Towards Goal 
? [] Not Progressing Towards Goals ? [] Goals Met/Resolved ? [] Goals Not Met/ Resolved · Patient/ Family Education ? Progressing Towards Goals RO/HEMODIAYLSIS MACHINE SAFETY CHECKS- BEFORE EACH TREATMENT [x] [facility]: Machine Serial #1:  [id number]   RO Serial #1: [id number] [] [facility]: Machine Serial #2:  [id number]   RO Serial #2: [id number] [] [facility]: Machine Serial #3:  [id number]   RO Serial #[id number] Alarm Test: [x] Pass  Time__2242______ [x] RO/Machine Log Complete    [x] Extracorporeal circuit Tested for integrity Dialyzer__[id number]________   Tubing__20I11-12__________ Dialysate: pH_7.2__  Temp. _36___Conductivity: Meter _14.0___ HD Machine__13.8_ CHLORINE TESTING- BEFORE EACH TREATMENT AND EVERY 4 HOURS Total Chlorine: [] Less than 0.1 ppm Time:_____2nd Check Time:______ 
(If greater than 0.1 ppm from Primary then every 30 minutes from Secondary) TREATMENT INIATION-WITH DIALYSIS PRECAUTIONS [x] All Connections Secured   [x] Saline Line Double Clamped    [x] Venous Parameters Set [x] Arterial Parameters Set    [x] Prime Given 250 ml    
[x] Air Foam Detector Engaged PRE-TREATMENT  
UF Calculations: Wt to lose:____ml(+) Oral:__ml(+)IV Meds/Fluids/Blood prods___ml(+) Prime/Rinse___ml(=)Total UF Goal____mL Scale Type:[x] Bed scale [] Sling Scale [] Wheel Chair Scale []  Not Ordered [] [] Unable to obtain pt on stretcher/ bed scale malfunctioning Tx Initiation Note:Plan to remove 1.5liters for 3.5 hours while monitoring patient's well-being and vital signs. [x] Time Out/Safety Check  Time:_2242____ INTRADIALYTIC MONITORING 
(SEE ATTACHED FLOWSHEET) POST TREATMENT Time Medication Dose Volume Route Initials [facility] Signatures Title Initials Time Dialyzer cleared: [] Good [x] Fair [] Poor Blood Processed _75.8__Liters Net UF Removed ___900_mL Post Tx Access: AVF/AVG: Bleeding Stop       Art.___min Bc.____min []+bruit/thrill Catheter: Locking Solution [x] Heparin 1 ml/1000 units  [] Normal Saline Art.___2.0__ ml Bc._2.0____ml Post Assessment:   
          Skin: [x]Warm []Dry []Diaphoretic []Flushed [] Pale []Cyanotic Lungs: []Clear []Coarse []Crackles [x]Wheezing Cardiac: []Regular [x]Irregular  []Monitored rhythm____ []N/A Edema: [x]None []General []Facial []Pedal  []UE []LE []RIGHT []LEFT Pain: []0 []1 []2 []3 []4 []5 []6 []7 []8 []9 []10 [place]s Scale 0 pain POST Tx Note: 
 
HD treatment completed . Patient rinsed with 250 mL 0.9% N/S. 2.0 mL instilled in arterial lumen, 2.0mL instilled in venous lumen of Left IJ TDC. Caps applied to lumen ends securely. Patient remains in  bed 359 in stable condition. O2 Pqg713%. 2 L of O2 via nasal cannula in place. No acute  distress noted. Bed in lowest position, call bell within reach. Primary Nurse Report: First initial/Last name/Title Post Dialysis:_____Tia BANSAL_____________________         Time:_____0300___________ Abbreviations: AVG-arterial venous graft, AVF-arterial venous fistula, IJ-Internal Jugular, Subcl-Subclavian, Fem-Femoral, Tx-treatment, AP/HR-apical heart rate, DFR-dialysate flow rate, BFR-blood flow rate, AP-arterial pressure, -venous pressure, UF-ultrafiltrate, TMP-transmembrane pressure, Bc-Venous, Art-Arterial, RO-Reverse Osmosis

## 2021-03-19 NOTE — PROGRESS NOTES
1323 Patient arrived to the unit s/p code blue. Intubated, unresponsive, no gag or corneal reflex, posturing. Levo. Francisco, and Fentanyl infusing. 18 Mother currently at bedside meeting with Saw and Dr. Richie Segovia. Patient will be transitioned to comfort measures. 1425 Compassionate extubation completed. Medicated with Ativan and Morphine for comfort. 1430 Unused bag of Versed given to Diana Link w/ pharmacy. 1457 Asystole. No heartbeat upon auscultation. Dr. Cricket Jones at bedside. Mother just left bedside and driving home. Will give time to get home safely before calling. [facility] paged. [phone] Message left with Kojami. 80 Mother notified of patient's death. 2599 [facility] at bedside. [address] returned call. Spoke w/ Abhishek Bennett. Notified of death. Will not pursue for tissue donation. Will refer to the eye bank.

## 2021-03-19 NOTE — PROGRESS NOTES
Physician Progress Note 
 
 
PATIENT:               CHRISTINA DOUGHERTY 
CSN #:                  [id number] 
:                       1960 
ADMIT DATE:       3/11/2021 9:42 PM 
DISCH DATE: 
RESPONDING 
PROVIDER #:        HEATHER ZEPEDA MD 
 
 
 
 
QUERY TEXT: 
 
Pt admitted with pleural effusion ,  Pt noted to have  Vfib and was intubated. If possible, please document in the progress notes and discharge summary if you are evaluating and/or treating any of the following: 
 
The medical record reflects the following: 
Risk Factors: pleural effusion, Vfib, 
Clinical Indicators: Code blue called , Vfib  unresponsive, on dialysis.ACLS protocol began, one shock delivered,  PEA. Patient was intubated by anesthesia. 
Treatment: ICU on vent 
 
Thank- you 
Jeana Singleton RN  [phone] 
Options provided: 
-- Acute respiratory failure with hypoxia 
-- Acute respiratory failure with hypercapnia 
-- Acute respiratory failure ruled out 
-- Other - I will add my own diagnosis 
-- Disagree - Not applicable / Not valid 
-- Disagree - Clinically unable to determine / Unknown 
-- Refer to Clinical Documentation Reviewer 
 
PROVIDER RESPONSE TEXT: 
 
This patient is in acute respiratory failure with hypoxia. 
 
Query created by: Jeana Singleton on 3/19/2021 1:12 PM 
 
 
Electronically signed by:  HEATHER ZEPEDA MD 3/19/2021 1:13 PM

## 2021-03-19 NOTE — PROGRESS NOTES
Problem: Pressure Injury - Risk of 
Goal: *Prevention of pressure injury Description: Document Lake Scale and appropriate interventions in the flowsheet. Outcome: Progressing Towards Goal 
Note: Pressure Injury Interventions: 
Sensory Interventions: Assess changes in LOC, Assess need for specialty bed, Avoid rigorous massage over bony prominences, Chair cushion, Check visual cues for pain, Discuss PT/OT consult with provider, Keep linens dry and wrinkle-free, Minimize linen layers, Maintain/enhance activity level, Pad between skin to skin, Monitor skin under medical devices, Pressure redistribution bed/mattress (bed type), Turn and reposition approx. every two hours (pillows and wedges if needed) Moisture Interventions: Absorbent underpads Activity Interventions: Chair cushion, Pressure redistribution bed/mattress(bed type), PT/OT evaluation, Increase time out of bed, Assess need for specialty bed Mobility Interventions: Assess need for specialty bed, Chair cushion, Float heels, Pressure redistribution bed/mattress (bed type), PT/OT evaluation, Turn and reposition approx. every two hours(pillow and wedges) Nutrition Interventions: Document food/fluid/supplement intake, Discuss nutritional consult with provider, Offer support with meals,snacks and hydration Friction and Shear Interventions: Feet elevated on foot rest, Foam dressings/transparent film/skin sealants, Lift sheet, Minimize layers, Lift team/patient mobility team, Transferring/repositioning devices Problem: Patient Education: Go to Patient Education Activity Goal: Patient/Family Education Outcome: Progressing Towards Goal 
  
Problem: Falls - Risk of 
Goal: *Absence of Falls Description: Document Karlee Blood Fall Risk and appropriate interventions in the flowsheet.  
Outcome: Progressing Towards Goal 
Note: Fall Risk Interventions: 
  
 
Mentation Interventions: Bed/chair exit alarm, Door open when patient unattended, Adequate sleep, hydration, pain control, Evaluate medications/consider consulting pharmacy, Increase mobility, More frequent rounding, Reorient patient, Room close to nurse's station, Toileting rounds, Update white board Medication Interventions: Bed/chair exit alarm, Evaluate medications/consider consulting pharmacy, Teach patient to arise slowly, Patient to call before getting OOB Elimination Interventions: Bed/chair exit alarm, Call light in reach, Elevated toilet seat, Patient to call for help with toileting needs, Stay With Me (per policy), Toilet paper/wipes in reach, Toileting schedule/hourly rounds Problem: Patient Education: Go to Patient Education Activity Goal: Patient/Family Education Outcome: Progressing Towards Goal 
  
Problem: Diabetes Self-Management Goal: *Disease process and treatment process Description: Define diabetes and identify own type of diabetes; list 3 options for treating diabetes. Outcome: Progressing Towards Goal 
Goal: *Incorporating nutritional management into lifestyle Description: Describe effect of type, amount and timing of food on blood glucose; list 3 methods for planning meals. Outcome: Progressing Towards Goal 
Goal: *Incorporating physical activity into lifestyle Description: State effect of exercise on blood glucose levels. Outcome: Progressing Towards Goal 
Goal: *Developing strategies to promote health/change behavior Description: Define the ABC's of diabetes; identify appropriate screenings, schedule and personal plan for screenings. Outcome: Progressing Towards Goal 
Goal: *Using medications safely Description: State effect of diabetes medications on diabetes; name diabetes medication taking, action and side effects. Outcome: Progressing Towards Goal 
Goal: *Monitoring blood glucose, interpreting and using results Description: Identify recommended blood glucose targets  and personal targets.  
Outcome: Progressing Towards Goal 
Goal: *Prevention, detection, treatment of acute complications Description: List symptoms of hyper- and hypoglycemia; describe how to treat low blood sugar and actions for lowering  high blood glucose level. Outcome: Progressing Towards Goal 
Goal: *Prevention, detection and treatment of chronic complications Description: Define the natural course of diabetes and describe the relationship of blood glucose levels to long term complications of diabetes. Outcome: Progressing Towards Goal 
Goal: *Developing strategies to address psychosocial issues Description: Describe feelings about living with diabetes; identify support needed and support network Outcome: Progressing Towards Goal 
Goal: *Insulin pump training Outcome: Progressing Towards Goal 
Goal: *Sick day guidelines Outcome: Progressing Towards Goal 
Goal: *Patient Specific Goal (EDIT GOAL, INSERT TEXT) Outcome: Progressing Towards Goal 
  
Problem: Patient Education: Go to Patient Education Activity Goal: Patient/Family Education Outcome: Progressing Towards Goal 
  
Problem: Chronic Renal Failure Goal: *Fluid and electrolytes stabilized Outcome: Progressing Towards Goal 
  
Problem: Patient Education: Go to Patient Education Activity Goal: Patient/Family Education Outcome: Progressing Towards Goal 
  
Problem: Patient Education: Go to Patient Education Activity Goal: Patient/Family Education Outcome: Progressing Towards Goal 
  
Problem: Patient Education: Go to Patient Education Activity Goal: Patient/Family Education Outcome: Progressing Towards Goal

## 2021-03-19 NOTE — FAMILY MEETING
Palliative care me and mom off patient had a meeting. 
As per patient wishes he would not want to continue on mechanical ventilation and life support. 
Patient multiple procedures hospitalizations in the past. 
He is currently unresponsive after cardiac arrest. 
Compassionate extubation. 
Once family ready we will stop pressors and mechanical ventilation and make patient comfortable. 
NAYELI Hamm MD

## 2021-03-19 NOTE — PROGRESS NOTES
2032 The patient answered a couple questions but then looked away and would not answer any further. Not following directions. Will hold evening medications for safety reasons and will notify MD on call. 2306 Dialysis has started. The patient is agitated and will not lay still or follow directions. Attempted to address any needs but it was unsuccessful. HR up to the 140's. Unable to determine the rhythm from the 5 lead EKG monitor and with the dialysis machine in the room a 12 lead machine cannot fit at this moment. Calling Dr Nazia Carr. 2335 0.5mg IV ativan given for agitation per orders. 0300 The patient tolerated dialysis fair. Received albumin twice for hypotension. BP now stable.  
 
0601 Glucose 64.  
 
0618 125cc of D10 administered IV since the patient is not following directions and cannot take anything safely PO.  
 
6759 Follow up glucose 179.

## 2021-03-19 NOTE — PROGRESS NOTES
CODE BLUE Tracking 1115 Pt in VFib, Code blue called 1116 1st dose epi delivered Shock delivered 1119 Pulse check, ROSC 
1122 PEA, CPR restarted 1123 Second dose epi delivered 1124 pulse check, 300 amiodarone delivered 1125 Sodium bicarbonate administered. 1125 Calcium chloride administered 1126 Third dose epi administered 1127 Pt pulse faint, ROSC, see flowsheets for bp  
1128 Blood sugar 90 
1129 1/2 D5 administered 1130 amiodarone drip @ 1 started 1134 8 norepi drip started @ 2 mics/3.8 ml 
1201 phenylephrine @ 250 started, levo @ 8, amio @ 1

## 2021-03-19 NOTE — PROGRESS NOTES
0730: report given to this nurse from [address] [place] [address]: dialysis in progress NAYELI Watts RN 
 
1100: MD Sabrina Rivas made aware pt c/o phantom pain in leg, MD makes this nurse aware matter to be addressed Morenita Moctezuma RN 
 
1150: RRT noted Morenita Moctezuma RN 
 
1200: code blue noted, CPR initiated with MD Elvin Berry at the bedside Morenita Moctezuma RN 
 
1225: pt resuscitated, ICU bed pending Morenita Moctezuma RN 
 
[id number]: report given to ICU nurse Yenny Flor RN  
 
1240: ICU transfer pending Morenita Moctezuma RN

## 2021-03-19 NOTE — PROGRESS NOTES
Patient:  Kelli Serra :  1960 Gender:  male MRN #:  [id number] Assessment:  
He is 64 yrs male with h/o ESRD on HD ( /W/) DM-2, severe peripheral arterial disease with 3 extremity amputations, CAD, thrombocytopenia, CHF, and CAD was  admitted with hypoxia, fever, and left sided pleural effusion. He had COVID -19 infection on azithro/ceftriaxone and dexamethasone. Latest COVID-19 test on 3/15 was negative.  
  
 
 
Plan: # ESRD on HD- // schedule. He tolerated dialysis yesterday . He is still confused this morning on oxygen . MRI on 3/18 is negative for acute intracranial findings . Will order ABG to see if he is retaining CO2  
- Dialysis again today in his regular schedule for 3.5 hours and decrease UF to  500 cc only for today - Midodrine 10 mg TID for hypotension, 30 minutes prior on dialysis day - Intake and Output recording - Dose all meds for ESRD # Anemia- Anemia of ESRD, Hemoglobin is below target range - Retacrit during HD #Hemodynamics-  Intermittent episodes of Hypotension 
- continue Midodrine 10 mg TID If BP drops below 90 mmg systolic , will increase midodrine to 20 mg TID # BMD- corrected calcium is close to normal  
Increased calcium bath for hypocalcemia PTH is suppresse Subjective/Interval Events/ROS   
- He is awake but still confused - Intermittent episodes of Hypotension with tachycardia.    
- He is in 2 lt/min oxygen SPO2  % Intake/Output Summary (Last 24 hours) at 3/19/2021 0901 Last data filed at 3/19/2021 7342 Gross per 24 hour Intake  Output 900 ml Net -900 ml Blood pressure 104/84, pulse (!) 105, temperature 98 °F (36.7 °C), resp. rate 20, height 5' (1.524 m), weight 58.5 kg (129 lb), SpO2 100 %. Exam:  
Pt awake and confused Chest: crackles at base CVS- s1s2 heard P/a: soft, non distended Extremity: amputation of right hand, RLE- BKA LLE- AKA  
CNS; confused BMP:  
Lab Results Component Value Date/Time  03/19/2021 02:55 AM  
 K 3.6 03/19/2021 02:55 AM  
  03/19/2021 02:55 AM  
 CO2 24 03/19/2021 02:55 AM  
 AGAP 9 03/19/2021 02:55 AM  
 GLU 70 (L) 03/19/2021 02:55 AM  
 BUN 14 03/19/2021 02:55 AM  
 CREA 2.72 (H) 03/19/2021 02:55 AM  
 GFRAA 29 (L) 03/19/2021 02:55 AM  
 GFRNA 24 (L) 03/19/2021 02:55 AM  
 
   
CBC WITH AUTOMATED DIFF Collection Time: 03/19/21  2:55 AM  
Result Value Ref Range WBC 2.8 (L) 4.6 - 13.2 K/uL  
 RBC 4.31 (L) 4.70 - 5.50 M/uL HGB 9.3 (L) 13.0 - 16.0 g/dL HCT 30.1 (L) 36.0 - 48.0 % MCV 69.8 (L) 74.0 - 97.0 FL  
 MCH 21.6 (L) 24.0 - 34.0 PG  
 MCHC 30.9 (L) 31.0 - 37.0 g/dL RDW 20.3 (H) 11.6 - 14.5 % PLATELET 62 (L) 968 - 420 K/uL NEUTROPHILS 65 42 - 75 % BAND NEUTROPHILS 2 0 - 5 % LYMPHOCYTES 25 20 - 51 % MONOCYTES 8 2 - 9 % EOSINOPHILS 0 0 - 5 % BASOPHILS 0 0 - 3 %  
 ABS. NEUTROPHILS 1.8 1.8 - 8.0 K/UL  
 ABS. LYMPHOCYTES 0.7 (L) 0.8 - 3.5 K/UL  
 ABS. MONOCYTES 0.2 0 - 1.0 K/UL  
 ABS. EOSINOPHILS 0.0 0.0 - 0.4 K/UL  
 ABS. BASOPHILS 0.0 0.0 - 0.1 K/UL PLATELET COMMENTS LARGE PLATELETS    
 RBC COMMENTS POLYCHROMASIA 1+ 
    
 RBC COMMENTS SCHISTOCYTES 1+ RBC COMMENTS OVALOCYTES 2+ 
    
 RBC COMMENTS ANISOCYTOSIS 2+ 
    
 RBC COMMENTS HYPOCHROMIA 3+ 
    
 RBC COMMENTS MICROCYTOSIS 2+ 
    
 RBC COMMENTS POIKILOCYTOSIS 1+ 
    
 RBC COMMENTS TARGET CELLS 
FEW 
    
 WBC COMMENTS REACTIVE LYMPHS    
 DF MANUAL Thank you very much for consult. I will continue to follow Zyoa Carreno MD 
Nephrology -[facility], [facility].

## 2021-03-20 LAB
BLD PROD TYP BPU: NORMAL
BLOOD BANK CMNT PATIENT-IMP: NORMAL
BPU ID: NORMAL
CALLED TO:,BCALL1: NORMAL
STATUS OF UNIT,%ST: NORMAL
UNIT DIVISION, %UDIV: 0

## 2021-03-24 LAB
BACTERIA SPEC CULT: NORMAL
BACTERIA SPEC CULT: NORMAL
SERVICE CMNT-IMP: NORMAL
SERVICE CMNT-IMP: NORMAL

## 2021-03-24 NOTE — PROGRESS NOTES
Physician Progress Note PATIENTLafrmp Nieves 
CSN #:                  [id number] :                       1960 ADMIT DATE:       3/11/2021 9:42 PM 
[address] [place] DATE:        3/19/2021 2:57 PM 
RESPONDING 
PROVIDER #:        Jordan THOMAS MD 
 
 
 
 
QUERY TEXT: 
 
Pt admitted with Pleural effusion . Pt noted to have Hypotension   If possible, please document in the progress notes and discharge summary if you are evaluating and/or treating any of the following: The medical record reflects the following: 
Risk Factors: Vfib Clinical Indicators: Hypotension - BP 86/54, 87/36, 83/47, 96/55, Vfib ,PEA Treatment: Levophed, IV ,ICU Thank- you Rebecca Norton RN [facility] [phone] Options provided: 
-- Cardiogenic Shock 
-- Neurogenic Shock 
-- Traumatic Shock -- Hypovolemic Shock -- Hypovolemia without Shock -- Hypotension without Shock 
-- Other - I will add my own diagnosis -- Disagree - Not applicable / Not valid -- Disagree - Clinically unable to determine / Unknown 
-- Refer to Clinical Documentation Reviewer PROVIDER RESPONSE TEXT: 
 
This patient has hypotension without shock.  
 
Query created by: Jessica Norton on 3/19/2021 1:18 PM 
 
 
Electronically signed by:  Jordan Alvarez MD 3/24/2021 8:48 AM

## 2021-03-28 NOTE — DISCHARGE SUMMARY
[facility] [place] DISCHARGE SUMMARY Name:  Jennifer Araiza 
MR#:   [id number] :  1960 ACCOUNT #:  [de-identified] ADMIT DATE:  2021 DISCHARGE DATE:  2021 DEATH SUMMARY 
 
DATE OF DEATH:  2021. DIAGNOSES AT DEATH: 
1.  End-stage renal disease, on dialysis. 2.  Amputation of both lower extremities and partial arm. 3.  Severe peripheral vascular disease. 4.  Chronic congestive heart failure. 5.  Encephalopathy, metabolic. 6.  Coronary artery disease. 7.  Decubitus ulcer. 8.  Thrombocytopenia. 9.  Pleural effusion. 10.  Recent COVID infection. HOSPITAL SUMMARY:  This was a 53-year-old gentleman with advanced medical disease including end-stage renal disease and prior three limb amputation due to severe peripheral vascular disease, who was admitted to the hospital on 2021. When he came in, he had hypoxia, fever, a pleural effusion. He had chronic diastolic heart failure, thrombocytopenia, decubitus ulcer, coronary artery disease, peripheral vascular disease, and is on dialysis three times a week. He lives at [facility]. He was treated for his infectious illness, dialyzed during his hospital stay, and monitored closely for recurrent COVID infection. He had fluid overload. Blood cultures were drawn. He was seen by Infectious Disease and Nephrology during his stay. He continued to have encephalopathy that was waxing and waning plus he had a Neurology consultation on 2021. He had a consultation with Palliative Care about goals of care. He remained a Full Code with full interventions. His mother was his point of contact during his hospitalization.   By 2021, the patient was on midodrine for hypotension, getting three dialysis, being managed for hypotension, and being monitored for recurrent testing on COVID as he had a rapid test that was positive on 2021 and then a subsequent PCR test that was negative on 03/15/2021. Chest x-ray on 03/19/2021 showed interval increase in diffuse airspace disease with small effusion. He had a CT scan of his chest on 03/12/2021 that showed consolidation bilaterally, extensive atherosclerotic vascular disease, pulmonary nodules. His echocardiogram on 03/19/2021 showed an ejection fraction down to 40-45%. On the day of his passing, he suffered a V-fib arrest while just beginning on dialysis. ACLS protocol was put into place. The patient was intubated and transferred to the Intensive Care Unit. He went through PEA during the resuscitative efforts as well. His mother opted for comfortable extubation and transition into comfort care and the patient passed away on the aforementioned date due to his multiple medical issues. See the chart for further details. Elmira Cruz MD 
 
 
[id number] 
D:  03/28/2021 10:19 
T:  03/28/2021 10:43 JOB #:  [id number]